# Patient Record
Sex: FEMALE | Race: WHITE | Employment: OTHER | URBAN - METROPOLITAN AREA
[De-identification: names, ages, dates, MRNs, and addresses within clinical notes are randomized per-mention and may not be internally consistent; named-entity substitution may affect disease eponyms.]

---

## 2018-10-08 ENCOUNTER — HOSPITAL ENCOUNTER (OUTPATIENT)
Dept: CT IMAGING | Age: 63
Discharge: HOME | End: 2018-10-08
Attending: INTERNAL MEDICINE
Payer: COMMERCIAL

## 2018-10-08 DIAGNOSIS — C34.11 CANCER OF BRONCHUS OF RIGHT UPPER LOBE (HCC): ICD-10-CM

## 2018-10-08 DIAGNOSIS — C34.11 MALIGNANT NEOPLASM OF UPPER LOBE, RIGHT BRONCHUS OR LUNG (HCC): ICD-10-CM

## 2018-10-08 LAB
GFR AFRICAN AMERICAN: >60 ML/MIN/1.73M2
GFR NON-AFRICAN AMERICAN: >60 ML/MIN/1.73M2
POC CREATININE: 0.9 MG/DL (ref 0.6–1.1)

## 2018-10-08 PROCEDURE — 6360000004 HC RX CONTRAST MEDICATION: Performed by: INTERNAL MEDICINE

## 2018-10-08 PROCEDURE — 71260 CT THORAX DX C+: CPT

## 2018-10-08 RX ADMIN — IOPAMIDOL 75 ML: 755 INJECTION, SOLUTION INTRAVENOUS at 09:54

## 2018-10-22 ENCOUNTER — HOSPITAL ENCOUNTER (OUTPATIENT)
Dept: PET IMAGING | Age: 63
Discharge: HOME | End: 2018-10-22
Payer: COMMERCIAL

## 2018-10-22 DIAGNOSIS — C34.11 CANCER OF BRONCHUS OF RIGHT UPPER LOBE (HCC): ICD-10-CM

## 2018-10-22 PROCEDURE — 78815 PET IMAGE W/CT SKULL-THIGH: CPT

## 2018-10-22 PROCEDURE — 3430000000 HC RX DIAGNOSTIC RADIOPHARMACEUTICAL: Performed by: INTERNAL MEDICINE

## 2018-10-22 PROCEDURE — A9552 F18 FDG: HCPCS | Performed by: INTERNAL MEDICINE

## 2018-10-22 RX ORDER — FLUDEOXYGLUCOSE F 18 200 MCI/ML
14.54 INJECTION, SOLUTION INTRAVENOUS
Status: COMPLETED | OUTPATIENT
Start: 2018-10-22 | End: 2018-10-22

## 2018-10-22 RX ADMIN — FLUDEOXYGLUCOSE F 18 14.54 MILLICURIE: 200 INJECTION, SOLUTION INTRAVENOUS at 08:31

## 2018-11-09 ENCOUNTER — HOSPITAL ENCOUNTER (OUTPATIENT)
Age: 63
Setting detail: SPECIMEN
Discharge: HOME | End: 2018-11-09
Payer: COMMERCIAL

## 2018-11-09 LAB
ALBUMIN SERPL-MCNC: 4.3 GM/DL (ref 3.4–5)
ALP BLD-CCNC: 138 IU/L (ref 40–129)
ALT SERPL-CCNC: 28 U/L (ref 10–40)
ANION GAP SERPL CALCULATED.3IONS-SCNC: 12 MMOL/L (ref 4–16)
AST SERPL-CCNC: 24 IU/L (ref 15–37)
BILIRUB SERPL-MCNC: 0.1 MG/DL (ref 0–1)
BUN BLDV-MCNC: 10 MG/DL (ref 6–23)
CALCIUM SERPL-MCNC: 9.2 MG/DL (ref 8.3–10.6)
CHLORIDE BLD-SCNC: 100 MMOL/L (ref 99–110)
CO2: 28 MMOL/L (ref 21–32)
CREAT SERPL-MCNC: 0.7 MG/DL (ref 0.6–1.1)
GFR AFRICAN AMERICAN: >60 ML/MIN/1.73M2
GFR NON-AFRICAN AMERICAN: >60 ML/MIN/1.73M2
GLUCOSE BLD-MCNC: 91 MG/DL (ref 70–99)
POTASSIUM SERPL-SCNC: 4.3 MMOL/L (ref 3.5–5.1)
SODIUM BLD-SCNC: 140 MMOL/L (ref 135–145)
TOTAL PROTEIN: 6.9 GM/DL (ref 6.4–8.2)

## 2018-11-09 PROCEDURE — 80053 COMPREHEN METABOLIC PANEL: CPT

## 2018-12-03 ENCOUNTER — HOSPITAL ENCOUNTER (OUTPATIENT)
Age: 63
Setting detail: SPECIMEN
Discharge: HOME | End: 2018-12-03
Payer: COMMERCIAL

## 2018-12-03 LAB — MAGNESIUM: 2.2 MG/DL (ref 1.8–2.4)

## 2018-12-03 PROCEDURE — 83735 ASSAY OF MAGNESIUM: CPT

## 2018-12-10 PROBLEM — C34.90 PRIMARY MALIGNANT NEOPLASM OF LUNG METASTATIC TO OTHER SITE (HCC): Status: ACTIVE | Noted: 2018-12-10

## 2018-12-26 ENCOUNTER — HOSPITAL ENCOUNTER (OUTPATIENT)
Age: 63
Setting detail: SPECIMEN
Discharge: HOME | End: 2018-12-26
Payer: COMMERCIAL

## 2018-12-26 LAB
ALBUMIN SERPL-MCNC: 3.9 GM/DL (ref 3.4–5)
ALP BLD-CCNC: 90 IU/L (ref 40–129)
ALT SERPL-CCNC: 11 U/L (ref 10–40)
ANION GAP SERPL CALCULATED.3IONS-SCNC: 13 MMOL/L (ref 4–16)
AST SERPL-CCNC: 16 IU/L (ref 15–37)
BILIRUB SERPL-MCNC: 0.1 MG/DL (ref 0–1)
BUN BLDV-MCNC: 8 MG/DL (ref 6–23)
CALCIUM SERPL-MCNC: 9 MG/DL (ref 8.3–10.6)
CHLORIDE BLD-SCNC: 102 MMOL/L (ref 99–110)
CO2: 25 MMOL/L (ref 21–32)
CREAT SERPL-MCNC: 0.6 MG/DL (ref 0.6–1.1)
GFR AFRICAN AMERICAN: >60 ML/MIN/1.73M2
GFR NON-AFRICAN AMERICAN: >60 ML/MIN/1.73M2
GLUCOSE BLD-MCNC: 96 MG/DL (ref 70–99)
MAGNESIUM: 1.8 MG/DL (ref 1.8–2.4)
POTASSIUM SERPL-SCNC: 4.2 MMOL/L (ref 3.5–5.1)
SODIUM BLD-SCNC: 140 MMOL/L (ref 135–145)
TOTAL PROTEIN: 6.4 GM/DL (ref 6.4–8.2)

## 2018-12-26 PROCEDURE — 83735 ASSAY OF MAGNESIUM: CPT

## 2018-12-26 PROCEDURE — 80053 COMPREHEN METABOLIC PANEL: CPT

## 2019-01-16 ENCOUNTER — HOSPITAL ENCOUNTER (OUTPATIENT)
Age: 64
Setting detail: SPECIMEN
Discharge: HOME | End: 2019-01-16
Payer: COMMERCIAL

## 2019-01-16 LAB — MAGNESIUM: 1.9 MG/DL (ref 1.8–2.4)

## 2019-01-16 PROCEDURE — 83735 ASSAY OF MAGNESIUM: CPT

## 2019-02-11 ENCOUNTER — HOSPITAL ENCOUNTER (OUTPATIENT)
Dept: CT IMAGING | Age: 64
Discharge: HOME | End: 2019-02-11
Payer: COMMERCIAL

## 2019-02-11 ENCOUNTER — HOSPITAL ENCOUNTER (OUTPATIENT)
Age: 64
Discharge: HOME | End: 2019-02-11
Payer: COMMERCIAL

## 2019-02-11 DIAGNOSIS — C34.11 CANCER OF BRONCHUS OF RIGHT UPPER LOBE (HCC): ICD-10-CM

## 2019-02-11 LAB
GFR AFRICAN AMERICAN: >60 ML/MIN/1.73M2
GFR NON-AFRICAN AMERICAN: >60 ML/MIN/1.73M2
POC CREATININE: 0.7 MG/DL (ref 0.6–1.1)

## 2019-02-11 PROCEDURE — 74177 CT ABD & PELVIS W/CONTRAST: CPT

## 2019-02-11 PROCEDURE — 6360000004 HC RX CONTRAST MEDICATION: Performed by: INTERNAL MEDICINE

## 2019-02-11 PROCEDURE — 70491 CT SOFT TISSUE NECK W/DYE: CPT

## 2019-02-11 PROCEDURE — 71260 CT THORAX DX C+: CPT

## 2019-02-11 RX ADMIN — IOPAMIDOL 75 ML: 755 INJECTION, SOLUTION INTRAVENOUS at 10:41

## 2019-02-11 RX ADMIN — IOHEXOL 50 ML: 240 INJECTION, SOLUTION INTRATHECAL; INTRAVASCULAR; INTRAVENOUS; ORAL at 10:41

## 2019-02-25 ENCOUNTER — HOSPITAL ENCOUNTER (OUTPATIENT)
Age: 64
Setting detail: OBSERVATION
Discharge: HOME | End: 2019-02-26
Attending: EMERGENCY MEDICINE | Admitting: INTERNAL MEDICINE
Payer: COMMERCIAL

## 2019-02-25 ENCOUNTER — APPOINTMENT (OUTPATIENT)
Dept: GENERAL RADIOLOGY | Age: 64
End: 2019-02-25
Payer: COMMERCIAL

## 2019-02-25 DIAGNOSIS — R07.9 CHEST PAIN, UNSPECIFIED TYPE: Primary | ICD-10-CM

## 2019-02-25 LAB
ALBUMIN SERPL-MCNC: 4.1 GM/DL (ref 3.4–5)
ALP BLD-CCNC: 89 IU/L (ref 40–129)
ALT SERPL-CCNC: 12 U/L (ref 10–40)
ANION GAP SERPL CALCULATED.3IONS-SCNC: 14 MMOL/L (ref 4–16)
ANISOCYTOSIS: ABNORMAL
AST SERPL-CCNC: 20 IU/L (ref 15–37)
BANDED NEUTROPHILS ABSOLUTE COUNT: 0.1 K/CU MM
BANDED NEUTROPHILS RELATIVE PERCENT: 4 % (ref 5–11)
BILIRUB SERPL-MCNC: 0.1 MG/DL (ref 0–1)
BUN BLDV-MCNC: 7 MG/DL (ref 6–23)
CALCIUM SERPL-MCNC: 9.1 MG/DL (ref 8.3–10.6)
CHLORIDE BLD-SCNC: 101 MMOL/L (ref 99–110)
CO2: 24 MMOL/L (ref 21–32)
CREAT SERPL-MCNC: 0.8 MG/DL (ref 0.6–1.1)
DIFFERENTIAL TYPE: ABNORMAL
EKG ATRIAL RATE: 93 BPM
EKG DIAGNOSIS: NORMAL
EKG P AXIS: 50 DEGREES
EKG P-R INTERVAL: 146 MS
EKG Q-T INTERVAL: 370 MS
EKG QRS DURATION: 82 MS
EKG QTC CALCULATION (BAZETT): 460 MS
EKG R AXIS: 12 DEGREES
EKG T AXIS: -19 DEGREES
EKG VENTRICULAR RATE: 93 BPM
EOSINOPHILS ABSOLUTE: 0.1 K/CU MM
EOSINOPHILS RELATIVE PERCENT: 2 % (ref 0–3)
GFR AFRICAN AMERICAN: >60 ML/MIN/1.73M2
GFR NON-AFRICAN AMERICAN: >60 ML/MIN/1.73M2
GLUCOSE BLD-MCNC: 118 MG/DL (ref 70–99)
HCT VFR BLD CALC: 28.7 % (ref 37–47)
HEMOGLOBIN: 9.4 GM/DL (ref 12.5–16)
LYMPHOCYTES ABSOLUTE: 1.4 K/CU MM
LYMPHOCYTES RELATIVE PERCENT: 54 % (ref 24–44)
MACROCYTES: ABNORMAL
MAGNESIUM: 2 MG/DL (ref 1.8–2.4)
MCH RBC QN AUTO: 32.9 PG (ref 27–31)
MCHC RBC AUTO-ENTMCNC: 32.8 % (ref 32–36)
MCV RBC AUTO: 100.3 FL (ref 78–100)
MONOCYTES ABSOLUTE: 0.3 K/CU MM
MONOCYTES RELATIVE PERCENT: 13 % (ref 0–4)
OVALOCYTES: ABNORMAL
PDW BLD-RTO: 17.9 % (ref 11.7–14.9)
PLATELET # BLD: 154 K/CU MM (ref 140–440)
PLT MORPHOLOGY: ABNORMAL
PMV BLD AUTO: 10.7 FL (ref 7.5–11.1)
POLYCHROMASIA: ABNORMAL
POTASSIUM SERPL-SCNC: 3.4 MMOL/L (ref 3.5–5.1)
RBC # BLD: 2.86 M/CU MM (ref 4.2–5.4)
SEGMENTED NEUTROPHILS ABSOLUTE COUNT: 0.7 K/CU MM
SEGMENTED NEUTROPHILS RELATIVE PERCENT: 27 % (ref 36–66)
SODIUM BLD-SCNC: 139 MMOL/L (ref 135–145)
TOTAL PROTEIN: 7 GM/DL (ref 6.4–8.2)
TROPONIN T: <0.01 NG/ML
TROPONIN T: <0.01 NG/ML
TSH HIGH SENSITIVITY: 13.19 UIU/ML (ref 0.27–4.2)
WBC # BLD: 2.6 K/CU MM (ref 4–10.5)

## 2019-02-25 PROCEDURE — 84484 ASSAY OF TROPONIN QUANT: CPT

## 2019-02-25 PROCEDURE — 2580000003 HC RX 258: Performed by: NURSE PRACTITIONER

## 2019-02-25 PROCEDURE — 6370000000 HC RX 637 (ALT 250 FOR IP): Performed by: NURSE PRACTITIONER

## 2019-02-25 PROCEDURE — 85007 BL SMEAR W/DIFF WBC COUNT: CPT

## 2019-02-25 PROCEDURE — 6370000000 HC RX 637 (ALT 250 FOR IP): Performed by: EMERGENCY MEDICINE

## 2019-02-25 PROCEDURE — G0378 HOSPITAL OBSERVATION PER HR: HCPCS

## 2019-02-25 PROCEDURE — 96372 THER/PROPH/DIAG INJ SC/IM: CPT

## 2019-02-25 PROCEDURE — 85027 COMPLETE CBC AUTOMATED: CPT

## 2019-02-25 PROCEDURE — 99285 EMERGENCY DEPT VISIT HI MDM: CPT

## 2019-02-25 PROCEDURE — 84443 ASSAY THYROID STIM HORMONE: CPT

## 2019-02-25 PROCEDURE — 71046 X-RAY EXAM CHEST 2 VIEWS: CPT

## 2019-02-25 PROCEDURE — 93010 ELECTROCARDIOGRAM REPORT: CPT | Performed by: INTERNAL MEDICINE

## 2019-02-25 PROCEDURE — 83735 ASSAY OF MAGNESIUM: CPT

## 2019-02-25 PROCEDURE — 93005 ELECTROCARDIOGRAM TRACING: CPT | Performed by: EMERGENCY MEDICINE

## 2019-02-25 PROCEDURE — 6360000002 HC RX W HCPCS: Performed by: NURSE PRACTITIONER

## 2019-02-25 PROCEDURE — 80053 COMPREHEN METABOLIC PANEL: CPT

## 2019-02-25 RX ORDER — NITROGLYCERIN 0.4 MG/1
0.4 TABLET SUBLINGUAL EVERY 5 MIN PRN
Status: COMPLETED | OUTPATIENT
Start: 2019-02-25 | End: 2019-02-25

## 2019-02-25 RX ORDER — ASPIRIN 325 MG
325 TABLET ORAL DAILY
Status: DISCONTINUED | OUTPATIENT
Start: 2019-02-25 | End: 2019-02-26 | Stop reason: HOSPADM

## 2019-02-25 RX ORDER — ATORVASTATIN CALCIUM 40 MG/1
40 TABLET, FILM COATED ORAL NIGHTLY
Status: DISCONTINUED | OUTPATIENT
Start: 2019-02-25 | End: 2019-02-26 | Stop reason: HOSPADM

## 2019-02-25 RX ORDER — SODIUM CHLORIDE 0.9 % (FLUSH) 0.9 %
10 SYRINGE (ML) INJECTION PRN
Status: DISCONTINUED | OUTPATIENT
Start: 2019-02-25 | End: 2019-02-26 | Stop reason: HOSPADM

## 2019-02-25 RX ORDER — ASPIRIN 81 MG/1
324 TABLET, CHEWABLE ORAL ONCE
Status: COMPLETED | OUTPATIENT
Start: 2019-02-25 | End: 2019-02-25

## 2019-02-25 RX ORDER — ACETAMINOPHEN 325 MG/1
650 TABLET ORAL EVERY 4 HOURS PRN
Status: DISCONTINUED | OUTPATIENT
Start: 2019-02-25 | End: 2019-02-26 | Stop reason: HOSPADM

## 2019-02-25 RX ORDER — ONDANSETRON 4 MG/1
8 TABLET, ORALLY DISINTEGRATING ORAL PRN
Status: DISCONTINUED | OUTPATIENT
Start: 2019-02-25 | End: 2019-02-26 | Stop reason: HOSPADM

## 2019-02-25 RX ORDER — ONDANSETRON 2 MG/ML
4 INJECTION INTRAMUSCULAR; INTRAVENOUS EVERY 6 HOURS PRN
Status: DISCONTINUED | OUTPATIENT
Start: 2019-02-25 | End: 2019-02-26 | Stop reason: HOSPADM

## 2019-02-25 RX ORDER — POTASSIUM CHLORIDE 20 MEQ/1
40 TABLET, EXTENDED RELEASE ORAL ONCE
Status: COMPLETED | OUTPATIENT
Start: 2019-02-25 | End: 2019-02-25

## 2019-02-25 RX ORDER — ASPIRIN 81 MG/1
81 TABLET, CHEWABLE ORAL DAILY
Status: CANCELLED | OUTPATIENT
Start: 2019-02-26

## 2019-02-25 RX ORDER — PROCHLORPERAZINE MALEATE 5 MG/1
10 TABLET ORAL PRN
Status: DISCONTINUED | OUTPATIENT
Start: 2019-02-25 | End: 2019-02-26 | Stop reason: HOSPADM

## 2019-02-25 RX ORDER — M-VIT,TX,IRON,MINS/CALC/FOLIC 27MG-0.4MG
1 TABLET ORAL DAILY
Status: DISCONTINUED | OUTPATIENT
Start: 2019-02-25 | End: 2019-02-26 | Stop reason: HOSPADM

## 2019-02-25 RX ORDER — SODIUM CHLORIDE 0.9 % (FLUSH) 0.9 %
10 SYRINGE (ML) INJECTION EVERY 12 HOURS SCHEDULED
Status: DISCONTINUED | OUTPATIENT
Start: 2019-02-25 | End: 2019-02-26 | Stop reason: HOSPADM

## 2019-02-25 RX ADMIN — NITROGLYCERIN 0.4 MG: 0.4 TABLET, ORALLY DISINTEGRATING SUBLINGUAL at 09:40

## 2019-02-25 RX ADMIN — ENOXAPARIN SODIUM 40 MG: 40 INJECTION SUBCUTANEOUS at 18:17

## 2019-02-25 RX ADMIN — MULTIPLE VITAMINS W/ MINERALS TAB 1 TABLET: TAB at 18:18

## 2019-02-25 RX ADMIN — ACETAMINOPHEN 650 MG: 325 TABLET ORAL at 18:50

## 2019-02-25 RX ADMIN — NITROGLYCERIN 0.4 MG: 0.4 TABLET, ORALLY DISINTEGRATING SUBLINGUAL at 12:45

## 2019-02-25 RX ADMIN — NITROGLYCERIN 0.4 MG: 0.4 TABLET, ORALLY DISINTEGRATING SUBLINGUAL at 18:02

## 2019-02-25 RX ADMIN — ATORVASTATIN CALCIUM 40 MG: 40 TABLET, FILM COATED ORAL at 22:44

## 2019-02-25 RX ADMIN — POTASSIUM CHLORIDE 40 MEQ: 1500 TABLET, EXTENDED RELEASE ORAL at 18:18

## 2019-02-25 RX ADMIN — ASPIRIN 325 MG ORAL TABLET 325 MG: 325 PILL ORAL at 18:18

## 2019-02-25 RX ADMIN — Medication 60 MG: at 22:44

## 2019-02-25 RX ADMIN — LEVOTHYROXINE SODIUM 175 MCG: 25 TABLET ORAL at 18:18

## 2019-02-25 RX ADMIN — SODIUM CHLORIDE, PRESERVATIVE FREE 10 ML: 5 INJECTION INTRAVENOUS at 19:57

## 2019-02-25 RX ADMIN — ASPIRIN 81 MG 324 MG: 81 TABLET ORAL at 09:40

## 2019-02-25 ASSESSMENT — PAIN DESCRIPTION - DESCRIPTORS: DESCRIPTORS: PRESSURE

## 2019-02-25 ASSESSMENT — PAIN DESCRIPTION - LOCATION: LOCATION: CHEST

## 2019-02-25 ASSESSMENT — PAIN DESCRIPTION - ORIENTATION: ORIENTATION: MID

## 2019-02-25 ASSESSMENT — PAIN DESCRIPTION - PAIN TYPE: TYPE: ACUTE PAIN

## 2019-02-25 ASSESSMENT — PAIN DESCRIPTION - FREQUENCY: FREQUENCY: CONTINUOUS

## 2019-02-25 ASSESSMENT — PAIN SCALES - GENERAL
PAINLEVEL_OUTOF10: 5
PAINLEVEL_OUTOF10: 3
PAINLEVEL_OUTOF10: 0

## 2019-02-26 ENCOUNTER — APPOINTMENT (OUTPATIENT)
Dept: NUCLEAR MEDICINE | Age: 64
End: 2019-02-26
Payer: COMMERCIAL

## 2019-02-26 VITALS
TEMPERATURE: 98.8 F | DIASTOLIC BLOOD PRESSURE: 73 MMHG | RESPIRATION RATE: 19 BRPM | BODY MASS INDEX: 29.43 KG/M2 | WEIGHT: 172.4 LBS | OXYGEN SATURATION: 97 % | HEART RATE: 96 BPM | SYSTOLIC BLOOD PRESSURE: 142 MMHG | HEIGHT: 64 IN

## 2019-02-26 LAB
ANION GAP SERPL CALCULATED.3IONS-SCNC: 13 MMOL/L (ref 4–16)
BUN BLDV-MCNC: 6 MG/DL (ref 6–23)
CALCIUM SERPL-MCNC: 8.2 MG/DL (ref 8.3–10.6)
CHLORIDE BLD-SCNC: 104 MMOL/L (ref 99–110)
CHOLESTEROL: 256 MG/DL
CO2: 23 MMOL/L (ref 21–32)
CREAT SERPL-MCNC: 0.7 MG/DL (ref 0.6–1.1)
EKG ATRIAL RATE: 85 BPM
EKG DIAGNOSIS: NORMAL
EKG P AXIS: 49 DEGREES
EKG P-R INTERVAL: 160 MS
EKG Q-T INTERVAL: 402 MS
EKG QRS DURATION: 76 MS
EKG QTC CALCULATION (BAZETT): 478 MS
EKG R AXIS: 32 DEGREES
EKG T AXIS: 27 DEGREES
EKG VENTRICULAR RATE: 85 BPM
FOLATE: >20 NG/ML (ref 3.1–17.5)
GFR AFRICAN AMERICAN: >60 ML/MIN/1.73M2
GFR NON-AFRICAN AMERICAN: >60 ML/MIN/1.73M2
GLUCOSE BLD-MCNC: 106 MG/DL (ref 70–99)
HCT VFR BLD CALC: 27.7 % (ref 37–47)
HDLC SERPL-MCNC: 70 MG/DL
HEMOGLOBIN: 8.8 GM/DL (ref 12.5–16)
LDL CHOLESTEROL DIRECT: 187 MG/DL
LV EF: 58 %
LV EF: 70 %
LVEF MODALITY: NORMAL
LVEF MODALITY: NORMAL
MCH RBC QN AUTO: 32.5 PG (ref 27–31)
MCHC RBC AUTO-ENTMCNC: 31.8 % (ref 32–36)
MCV RBC AUTO: 102.2 FL (ref 78–100)
PDW BLD-RTO: 18.4 % (ref 11.7–14.9)
PLATELET # BLD: 154 K/CU MM (ref 140–440)
PMV BLD AUTO: 11 FL (ref 7.5–11.1)
POTASSIUM SERPL-SCNC: 3.8 MMOL/L (ref 3.5–5.1)
RBC # BLD: 2.71 M/CU MM (ref 4.2–5.4)
SODIUM BLD-SCNC: 140 MMOL/L (ref 135–145)
TRIGL SERPL-MCNC: 104 MG/DL
VITAMIN B-12: 541.8 PG/ML (ref 211–911)
WBC # BLD: 2 K/CU MM (ref 4–10.5)

## 2019-02-26 PROCEDURE — A9500 TC99M SESTAMIBI: HCPCS | Performed by: INTERNAL MEDICINE

## 2019-02-26 PROCEDURE — 83721 ASSAY OF BLOOD LIPOPROTEIN: CPT

## 2019-02-26 PROCEDURE — 80048 BASIC METABOLIC PNL TOTAL CA: CPT

## 2019-02-26 PROCEDURE — 78452 HT MUSCLE IMAGE SPECT MULT: CPT

## 2019-02-26 PROCEDURE — 93017 CV STRESS TEST TRACING ONLY: CPT

## 2019-02-26 PROCEDURE — 6360000002 HC RX W HCPCS: Performed by: INTERNAL MEDICINE

## 2019-02-26 PROCEDURE — 82607 VITAMIN B-12: CPT

## 2019-02-26 PROCEDURE — 93306 TTE W/DOPPLER COMPLETE: CPT

## 2019-02-26 PROCEDURE — 99244 OFF/OP CNSLTJ NEW/EST MOD 40: CPT | Performed by: INTERNAL MEDICINE

## 2019-02-26 PROCEDURE — 3430000000 HC RX DIAGNOSTIC RADIOPHARMACEUTICAL: Performed by: INTERNAL MEDICINE

## 2019-02-26 PROCEDURE — 2580000003 HC RX 258: Performed by: NURSE PRACTITIONER

## 2019-02-26 PROCEDURE — 85027 COMPLETE CBC AUTOMATED: CPT

## 2019-02-26 PROCEDURE — G0378 HOSPITAL OBSERVATION PER HR: HCPCS

## 2019-02-26 PROCEDURE — 94761 N-INVAS EAR/PLS OXIMETRY MLT: CPT

## 2019-02-26 PROCEDURE — 80061 LIPID PANEL: CPT

## 2019-02-26 PROCEDURE — 82746 ASSAY OF FOLIC ACID SERUM: CPT

## 2019-02-26 RX ORDER — ATORVASTATIN CALCIUM 10 MG/1
10 TABLET, FILM COATED ORAL NIGHTLY
Qty: 30 TABLET | Refills: 0 | Status: ON HOLD | OUTPATIENT
Start: 2019-02-26 | End: 2019-03-14 | Stop reason: HOSPADM

## 2019-02-26 RX ADMIN — REGADENOSON 0.4 MG: 0.08 INJECTION, SOLUTION INTRAVENOUS at 12:23

## 2019-02-26 RX ADMIN — SODIUM CHLORIDE, PRESERVATIVE FREE 10 ML: 5 INJECTION INTRAVENOUS at 18:07

## 2019-02-26 RX ADMIN — Medication 30 MILLICURIE: at 12:20

## 2019-02-26 RX ADMIN — Medication 10 MILLICURIE: at 10:35

## 2019-02-26 ASSESSMENT — PAIN DESCRIPTION - FREQUENCY: FREQUENCY: CONTINUOUS

## 2019-02-26 ASSESSMENT — PAIN DESCRIPTION - PAIN TYPE: TYPE: ACUTE PAIN

## 2019-02-26 ASSESSMENT — PAIN SCALES - GENERAL: PAINLEVEL_OUTOF10: 4

## 2019-02-26 ASSESSMENT — PAIN DESCRIPTION - DESCRIPTORS: DESCRIPTORS: PRESSURE

## 2019-02-26 ASSESSMENT — PAIN DESCRIPTION - ORIENTATION: ORIENTATION: MID

## 2019-02-26 ASSESSMENT — PAIN DESCRIPTION - LOCATION: LOCATION: CHEST

## 2019-03-08 ENCOUNTER — HOSPITAL ENCOUNTER (OUTPATIENT)
Age: 64
Setting detail: SPECIMEN
Discharge: HOME | DRG: 193 | End: 2019-03-08
Payer: COMMERCIAL

## 2019-03-08 LAB
ALBUMIN SERPL-MCNC: 4.1 GM/DL (ref 3.4–5)
ALP BLD-CCNC: 85 IU/L (ref 40–128)
ALT SERPL-CCNC: 12 U/L (ref 10–40)
ANION GAP SERPL CALCULATED.3IONS-SCNC: 13 MMOL/L (ref 4–16)
AST SERPL-CCNC: 18 IU/L (ref 15–37)
BILIRUB SERPL-MCNC: 0.2 MG/DL (ref 0–1)
BUN BLDV-MCNC: 8 MG/DL (ref 6–23)
CALCIUM SERPL-MCNC: 8.8 MG/DL (ref 8.3–10.6)
CHLORIDE BLD-SCNC: 95 MMOL/L (ref 99–110)
CO2: 27 MMOL/L (ref 21–32)
CREAT SERPL-MCNC: 0.6 MG/DL (ref 0.6–1.1)
GFR AFRICAN AMERICAN: >60 ML/MIN/1.73M2
GFR NON-AFRICAN AMERICAN: >60 ML/MIN/1.73M2
GLUCOSE BLD-MCNC: 117 MG/DL (ref 70–99)
POTASSIUM SERPL-SCNC: 3.6 MMOL/L (ref 3.5–5.1)
SODIUM BLD-SCNC: 135 MMOL/L (ref 135–145)
TOTAL PROTEIN: 6.5 GM/DL (ref 6.4–8.2)

## 2019-03-08 PROCEDURE — 80053 COMPREHEN METABOLIC PANEL: CPT

## 2019-03-10 ENCOUNTER — APPOINTMENT (OUTPATIENT)
Dept: GENERAL RADIOLOGY | Age: 64
DRG: 193 | End: 2019-03-10
Payer: COMMERCIAL

## 2019-03-10 ENCOUNTER — HOSPITAL ENCOUNTER (INPATIENT)
Age: 64
LOS: 4 days | Discharge: HOME | DRG: 193 | End: 2019-03-14
Attending: EMERGENCY MEDICINE | Admitting: INTERNAL MEDICINE
Payer: COMMERCIAL

## 2019-03-10 DIAGNOSIS — D61.818 PANCYTOPENIA (HCC): ICD-10-CM

## 2019-03-10 DIAGNOSIS — E87.6 HYPOKALEMIA: ICD-10-CM

## 2019-03-10 DIAGNOSIS — E87.1 HYPONATREMIA: ICD-10-CM

## 2019-03-10 DIAGNOSIS — J10.1 INFLUENZA A: Primary | ICD-10-CM

## 2019-03-10 LAB
ALBUMIN SERPL-MCNC: 3.7 GM/DL (ref 3.4–5)
ALP BLD-CCNC: 73 IU/L (ref 40–129)
ALT SERPL-CCNC: 20 U/L (ref 10–40)
ANION GAP SERPL CALCULATED.3IONS-SCNC: 10 MMOL/L (ref 4–16)
AST SERPL-CCNC: 26 IU/L (ref 15–37)
BILIRUB SERPL-MCNC: 0.1 MG/DL (ref 0–1)
BUN BLDV-MCNC: 7 MG/DL (ref 6–23)
CALCIUM SERPL-MCNC: 7.8 MG/DL (ref 8.3–10.6)
CHLORIDE BLD-SCNC: 89 MMOL/L (ref 99–110)
CO2: 25 MMOL/L (ref 21–32)
CREAT SERPL-MCNC: 0.5 MG/DL (ref 0.6–1.1)
DIFFERENTIAL TYPE: ABNORMAL
EOSINOPHILS ABSOLUTE: 0 K/CU MM
EOSINOPHILS RELATIVE PERCENT: 1 % (ref 0–3)
GFR AFRICAN AMERICAN: >60 ML/MIN/1.73M2
GFR NON-AFRICAN AMERICAN: >60 ML/MIN/1.73M2
GLUCOSE BLD-MCNC: 109 MG/DL (ref 70–99)
HCT VFR BLD CALC: 21.7 % (ref 37–47)
HEMOGLOBIN: 7.2 GM/DL (ref 12.5–16)
LACTATE: 1.3 MMOL/L (ref 0.4–2)
LYMPHOCYTES ABSOLUTE: 0.3 K/CU MM
LYMPHOCYTES RELATIVE PERCENT: 37 % (ref 24–44)
MCH RBC QN AUTO: 33 PG (ref 27–31)
MCHC RBC AUTO-ENTMCNC: 33.2 % (ref 32–36)
MCV RBC AUTO: 99.5 FL (ref 78–100)
MICROCYTES: ABNORMAL
MONOCYTES ABSOLUTE: 0.1 K/CU MM
MONOCYTES RELATIVE PERCENT: 12 % (ref 0–4)
PDW BLD-RTO: 14.7 % (ref 11.7–14.9)
PLATELET # BLD: ABNORMAL K/CU MM (ref 140–440)
PMV BLD AUTO: 11.8 FL (ref 7.5–11.1)
POLYCHROMASIA: ABNORMAL
POTASSIUM SERPL-SCNC: ABNORMAL MMOL/L (ref 3.5–5.1)
PRO-BNP: 132.3 PG/ML
RAPID INFLUENZA  B AGN: NEGATIVE
RAPID INFLUENZA A AGN: POSITIVE
RBC # BLD: 2.18 M/CU MM (ref 4.2–5.4)
SEGMENTED NEUTROPHILS ABSOLUTE COUNT: 0.5 K/CU MM
SEGMENTED NEUTROPHILS RELATIVE PERCENT: 50 % (ref 36–66)
SODIUM BLD-SCNC: 124 MMOL/L (ref 135–145)
TOTAL PROTEIN: 6.3 GM/DL (ref 6.4–8.2)
TROPONIN T: <0.01 NG/ML
WBC # BLD: 0.9 K/CU MM (ref 4–10.5)

## 2019-03-10 PROCEDURE — 94761 N-INVAS EAR/PLS OXIMETRY MLT: CPT

## 2019-03-10 PROCEDURE — 2580000003 HC RX 258: Performed by: PHYSICIAN ASSISTANT

## 2019-03-10 PROCEDURE — 84484 ASSAY OF TROPONIN QUANT: CPT

## 2019-03-10 PROCEDURE — 96365 THER/PROPH/DIAG IV INF INIT: CPT

## 2019-03-10 PROCEDURE — 6360000002 HC RX W HCPCS: Performed by: PHYSICIAN ASSISTANT

## 2019-03-10 PROCEDURE — 83880 ASSAY OF NATRIURETIC PEPTIDE: CPT

## 2019-03-10 PROCEDURE — 85007 BL SMEAR W/DIFF WBC COUNT: CPT

## 2019-03-10 PROCEDURE — 83605 ASSAY OF LACTIC ACID: CPT

## 2019-03-10 PROCEDURE — 87804 INFLUENZA ASSAY W/OPTIC: CPT

## 2019-03-10 PROCEDURE — 6370000000 HC RX 637 (ALT 250 FOR IP): Performed by: PHYSICIAN ASSISTANT

## 2019-03-10 PROCEDURE — 87040 BLOOD CULTURE FOR BACTERIA: CPT

## 2019-03-10 PROCEDURE — 1200000000 HC SEMI PRIVATE

## 2019-03-10 PROCEDURE — 93005 ELECTROCARDIOGRAM TRACING: CPT | Performed by: EMERGENCY MEDICINE

## 2019-03-10 PROCEDURE — 94640 AIRWAY INHALATION TREATMENT: CPT

## 2019-03-10 PROCEDURE — 80053 COMPREHEN METABOLIC PANEL: CPT

## 2019-03-10 PROCEDURE — 99284 EMERGENCY DEPT VISIT MOD MDM: CPT

## 2019-03-10 PROCEDURE — 71046 X-RAY EXAM CHEST 2 VIEWS: CPT

## 2019-03-10 PROCEDURE — 85027 COMPLETE CBC AUTOMATED: CPT

## 2019-03-10 RX ORDER — POTASSIUM CHLORIDE 7.45 MG/ML
10 INJECTION INTRAVENOUS
Status: COMPLETED | OUTPATIENT
Start: 2019-03-10 | End: 2019-03-11

## 2019-03-10 RX ORDER — POTASSIUM CHLORIDE 750 MG/1
40 TABLET, FILM COATED, EXTENDED RELEASE ORAL ONCE
Status: COMPLETED | OUTPATIENT
Start: 2019-03-10 | End: 2019-03-10

## 2019-03-10 RX ORDER — CODEINE PHOSPHATE AND GUAIFENESIN 10; 100 MG/5ML; MG/5ML
5 SOLUTION ORAL ONCE
Status: DISCONTINUED | OUTPATIENT
Start: 2019-03-10 | End: 2019-03-10

## 2019-03-10 RX ORDER — SODIUM CHLORIDE 9 MG/ML
INJECTION, SOLUTION INTRAVENOUS CONTINUOUS
Status: DISCONTINUED | OUTPATIENT
Start: 2019-03-10 | End: 2019-03-11

## 2019-03-10 RX ORDER — IPRATROPIUM BROMIDE AND ALBUTEROL SULFATE 2.5; .5 MG/3ML; MG/3ML
3 SOLUTION RESPIRATORY (INHALATION) ONCE
Status: COMPLETED | OUTPATIENT
Start: 2019-03-10 | End: 2019-03-10

## 2019-03-10 RX ORDER — OSELTAMIVIR PHOSPHATE 75 MG/1
75 CAPSULE ORAL ONCE
Status: COMPLETED | OUTPATIENT
Start: 2019-03-10 | End: 2019-03-10

## 2019-03-10 RX ORDER — 0.9 % SODIUM CHLORIDE 0.9 %
1000 INTRAVENOUS SOLUTION INTRAVENOUS ONCE
Status: COMPLETED | OUTPATIENT
Start: 2019-03-10 | End: 2019-03-11

## 2019-03-10 RX ADMIN — OSELTAMIVIR PHOSPHATE 75 MG: 75 CAPSULE ORAL at 22:25

## 2019-03-10 RX ADMIN — IPRATROPIUM BROMIDE AND ALBUTEROL SULFATE 3 AMPULE: .5; 3 SOLUTION RESPIRATORY (INHALATION) at 21:37

## 2019-03-10 RX ADMIN — POTASSIUM CHLORIDE 40 MEQ: 750 TABLET, FILM COATED, EXTENDED RELEASE ORAL at 22:37

## 2019-03-10 RX ADMIN — POTASSIUM CHLORIDE 10 MEQ: 7.46 INJECTION, SOLUTION INTRAVENOUS at 23:24

## 2019-03-10 RX ADMIN — SODIUM CHLORIDE 1000 ML: 9 INJECTION, SOLUTION INTRAVENOUS at 22:37

## 2019-03-10 RX ADMIN — SODIUM CHLORIDE: 9 INJECTION, SOLUTION INTRAVENOUS at 23:24

## 2019-03-10 ASSESSMENT — PAIN DESCRIPTION - PAIN TYPE: TYPE: ACUTE PAIN

## 2019-03-10 ASSESSMENT — PAIN SCALES - GENERAL: PAINLEVEL_OUTOF10: 5

## 2019-03-10 ASSESSMENT — PAIN DESCRIPTION - LOCATION: LOCATION: CHEST

## 2019-03-11 LAB
ANION GAP SERPL CALCULATED.3IONS-SCNC: 8 MMOL/L (ref 4–16)
ANISOCYTOSIS: ABNORMAL
BANDED NEUTROPHILS ABSOLUTE COUNT: 0.08 K/CU MM
BANDED NEUTROPHILS RELATIVE PERCENT: 11 % (ref 5–11)
BUN BLDV-MCNC: 5 MG/DL (ref 6–23)
CALCIUM SERPL-MCNC: 7.6 MG/DL (ref 8.3–10.6)
CHLORIDE BLD-SCNC: 100 MMOL/L (ref 99–110)
CO2: 25 MMOL/L (ref 21–32)
CREAT SERPL-MCNC: 0.5 MG/DL (ref 0.6–1.1)
DIFFERENTIAL TYPE: ABNORMAL
EOSINOPHILS ABSOLUTE: 0 K/CU MM
EOSINOPHILS RELATIVE PERCENT: 2 % (ref 0–3)
GFR AFRICAN AMERICAN: >60 ML/MIN/1.73M2
GFR NON-AFRICAN AMERICAN: >60 ML/MIN/1.73M2
GLUCOSE BLD-MCNC: 107 MG/DL (ref 70–99)
HCT VFR BLD CALC: 21.8 % (ref 37–47)
HEMOGLOBIN: 7 GM/DL (ref 12.5–16)
LYMPHOCYTES ABSOLUTE: 0.3 K/CU MM
LYMPHOCYTES RELATIVE PERCENT: 42 % (ref 24–44)
MACROCYTES: ABNORMAL
MAGNESIUM: 1.6 MG/DL (ref 1.8–2.4)
MCH RBC QN AUTO: 32.6 PG (ref 27–31)
MCHC RBC AUTO-ENTMCNC: 32.1 % (ref 32–36)
MCV RBC AUTO: 101.4 FL (ref 78–100)
MONOCYTES ABSOLUTE: 0.1 K/CU MM
MONOCYTES RELATIVE PERCENT: 17 % (ref 0–4)
PDW BLD-RTO: 14.8 % (ref 11.7–14.9)
PHOSPHORUS: 2.5 MG/DL (ref 2.5–4.9)
PLATELET # BLD: 37 K/CU MM (ref 140–440)
PMV BLD AUTO: 10.4 FL (ref 7.5–11.1)
POTASSIUM SERPL-SCNC: 3.9 MMOL/L (ref 3.5–5.1)
RBC # BLD: 2.15 M/CU MM (ref 4.2–5.4)
RBC # BLD: ABNORMAL 10*6/UL
SEGMENTED NEUTROPHILS ABSOLUTE COUNT: 0.2 K/CU MM
SEGMENTED NEUTROPHILS RELATIVE PERCENT: 28 % (ref 36–66)
SODIUM BLD-SCNC: 133 MMOL/L (ref 135–145)
WBC # BLD: ABNORMAL 10*3/UL
WBC # BLD: ABNORMAL K/CU MM (ref 4–10.5)

## 2019-03-11 PROCEDURE — 83735 ASSAY OF MAGNESIUM: CPT

## 2019-03-11 PROCEDURE — 6370000000 HC RX 637 (ALT 250 FOR IP): Performed by: INTERNAL MEDICINE

## 2019-03-11 PROCEDURE — 2580000003 HC RX 258: Performed by: INTERNAL MEDICINE

## 2019-03-11 PROCEDURE — 6360000002 HC RX W HCPCS: Performed by: PHYSICIAN ASSISTANT

## 2019-03-11 PROCEDURE — 6360000002 HC RX W HCPCS: Performed by: HOSPITALIST

## 2019-03-11 PROCEDURE — 6360000002 HC RX W HCPCS: Performed by: INTERNAL MEDICINE

## 2019-03-11 PROCEDURE — 1200000000 HC SEMI PRIVATE

## 2019-03-11 PROCEDURE — 2580000003 HC RX 258: Performed by: HOSPITALIST

## 2019-03-11 PROCEDURE — 84100 ASSAY OF PHOSPHORUS: CPT

## 2019-03-11 PROCEDURE — 2700000000 HC OXYGEN THERAPY PER DAY

## 2019-03-11 PROCEDURE — 94761 N-INVAS EAR/PLS OXIMETRY MLT: CPT

## 2019-03-11 PROCEDURE — 94640 AIRWAY INHALATION TREATMENT: CPT

## 2019-03-11 PROCEDURE — 85027 COMPLETE CBC AUTOMATED: CPT

## 2019-03-11 PROCEDURE — 85007 BL SMEAR W/DIFF WBC COUNT: CPT

## 2019-03-11 PROCEDURE — 80048 BASIC METABOLIC PNL TOTAL CA: CPT

## 2019-03-11 PROCEDURE — 94668 MNPJ CHEST WALL SBSQ: CPT

## 2019-03-11 PROCEDURE — 94667 MNPJ CHEST WALL 1ST: CPT

## 2019-03-11 RX ORDER — GUAIFENESIN/DEXTROMETHORPHAN 100-10MG/5
5 SYRUP ORAL EVERY 4 HOURS PRN
Status: DISCONTINUED | OUTPATIENT
Start: 2019-03-11 | End: 2019-03-14 | Stop reason: HOSPADM

## 2019-03-11 RX ORDER — SODIUM CHLORIDE 0.9 % (FLUSH) 0.9 %
10 SYRINGE (ML) INJECTION PRN
Status: DISCONTINUED | OUTPATIENT
Start: 2019-03-11 | End: 2019-03-14 | Stop reason: HOSPADM

## 2019-03-11 RX ORDER — GUAIFENESIN 600 MG/1
600 TABLET, EXTENDED RELEASE ORAL 2 TIMES DAILY
Status: DISCONTINUED | OUTPATIENT
Start: 2019-03-11 | End: 2019-03-14 | Stop reason: HOSPADM

## 2019-03-11 RX ORDER — IPRATROPIUM BROMIDE AND ALBUTEROL SULFATE 2.5; .5 MG/3ML; MG/3ML
1 SOLUTION RESPIRATORY (INHALATION)
Status: DISCONTINUED | OUTPATIENT
Start: 2019-03-11 | End: 2019-03-14 | Stop reason: HOSPADM

## 2019-03-11 RX ORDER — ASPIRIN 325 MG
325 TABLET ORAL DAILY
Status: DISCONTINUED | OUTPATIENT
Start: 2019-03-11 | End: 2019-03-14 | Stop reason: HOSPADM

## 2019-03-11 RX ORDER — POTASSIUM CHLORIDE AND SODIUM CHLORIDE 900; 300 MG/100ML; MG/100ML
INJECTION, SOLUTION INTRAVENOUS CONTINUOUS
Status: DISCONTINUED | OUTPATIENT
Start: 2019-03-11 | End: 2019-03-12

## 2019-03-11 RX ORDER — OSELTAMIVIR PHOSPHATE 75 MG/1
75 CAPSULE ORAL 2 TIMES DAILY
Status: DISCONTINUED | OUTPATIENT
Start: 2019-03-11 | End: 2019-03-14 | Stop reason: HOSPADM

## 2019-03-11 RX ORDER — ONDANSETRON 4 MG/1
8 TABLET, ORALLY DISINTEGRATING ORAL DAILY PRN
Status: DISCONTINUED | OUTPATIENT
Start: 2019-03-11 | End: 2019-03-14 | Stop reason: HOSPADM

## 2019-03-11 RX ORDER — POTASSIUM CHLORIDE 20 MEQ/1
40 TABLET, EXTENDED RELEASE ORAL PRN
Status: DISCONTINUED | OUTPATIENT
Start: 2019-03-11 | End: 2019-03-14 | Stop reason: HOSPADM

## 2019-03-11 RX ORDER — POTASSIUM CHLORIDE 7.45 MG/ML
10 INJECTION INTRAVENOUS PRN
Status: DISCONTINUED | OUTPATIENT
Start: 2019-03-11 | End: 2019-03-14 | Stop reason: HOSPADM

## 2019-03-11 RX ORDER — M-VIT,TX,IRON,MINS/CALC/FOLIC 27MG-0.4MG
1 TABLET ORAL DAILY
Status: DISCONTINUED | OUTPATIENT
Start: 2019-03-11 | End: 2019-03-14 | Stop reason: HOSPADM

## 2019-03-11 RX ORDER — ONDANSETRON 2 MG/ML
4 INJECTION INTRAMUSCULAR; INTRAVENOUS EVERY 6 HOURS PRN
Status: DISCONTINUED | OUTPATIENT
Start: 2019-03-11 | End: 2019-03-14 | Stop reason: HOSPADM

## 2019-03-11 RX ORDER — LACTOBACILLUS RHAMNOSUS GG 10B CELL
1 CAPSULE ORAL
Status: DISCONTINUED | OUTPATIENT
Start: 2019-03-11 | End: 2019-03-14 | Stop reason: HOSPADM

## 2019-03-11 RX ORDER — SODIUM CHLORIDE 9 MG/ML
INJECTION, SOLUTION INTRAVENOUS CONTINUOUS
Status: DISCONTINUED | OUTPATIENT
Start: 2019-03-11 | End: 2019-03-11

## 2019-03-11 RX ORDER — PROCHLORPERAZINE MALEATE 5 MG/1
10 TABLET ORAL EVERY 6 HOURS PRN
Status: DISCONTINUED | OUTPATIENT
Start: 2019-03-11 | End: 2019-03-14 | Stop reason: HOSPADM

## 2019-03-11 RX ORDER — BENZONATATE 100 MG/1
100 CAPSULE ORAL 3 TIMES DAILY PRN
Status: DISCONTINUED | OUTPATIENT
Start: 2019-03-11 | End: 2019-03-14 | Stop reason: HOSPADM

## 2019-03-11 RX ORDER — SODIUM CHLORIDE 0.9 % (FLUSH) 0.9 %
10 SYRINGE (ML) INJECTION EVERY 12 HOURS SCHEDULED
Status: DISCONTINUED | OUTPATIENT
Start: 2019-03-11 | End: 2019-03-14 | Stop reason: HOSPADM

## 2019-03-11 RX ORDER — LANOLIN ALCOHOL/MO/W.PET/CERES
500 CREAM (GRAM) TOPICAL NIGHTLY
Status: DISCONTINUED | OUTPATIENT
Start: 2019-03-11 | End: 2019-03-14 | Stop reason: HOSPADM

## 2019-03-11 RX ORDER — VITAMIN B COMPLEX
1 CAPSULE ORAL NIGHTLY
Status: DISCONTINUED | OUTPATIENT
Start: 2019-03-11 | End: 2019-03-14 | Stop reason: HOSPADM

## 2019-03-11 RX ORDER — POTASSIUM CHLORIDE 1.5 G/1.77G
40 POWDER, FOR SOLUTION ORAL PRN
Status: DISCONTINUED | OUTPATIENT
Start: 2019-03-11 | End: 2019-03-14 | Stop reason: HOSPADM

## 2019-03-11 RX ADMIN — ENOXAPARIN SODIUM 40 MG: 40 INJECTION SUBCUTANEOUS at 08:34

## 2019-03-11 RX ADMIN — GUAIFENESIN 600 MG: 600 TABLET, EXTENDED RELEASE ORAL at 08:29

## 2019-03-11 RX ADMIN — TBO-FILGRASTIM 300 MCG: 300 INJECTION, SOLUTION SUBCUTANEOUS at 15:09

## 2019-03-11 RX ADMIN — GUAIFENESIN AND DEXTROMETHORPHAN 5 ML: 100; 10 SYRUP ORAL at 06:08

## 2019-03-11 RX ADMIN — ONDANSETRON 4 MG: 2 INJECTION INTRAMUSCULAR; INTRAVENOUS at 02:32

## 2019-03-11 RX ADMIN — Medication 500 MG: at 22:21

## 2019-03-11 RX ADMIN — IPRATROPIUM BROMIDE AND ALBUTEROL SULFATE 1 AMPULE: .5; 3 SOLUTION RESPIRATORY (INHALATION) at 11:23

## 2019-03-11 RX ADMIN — SODIUM CHLORIDE, PRESERVATIVE FREE 10 ML: 5 INJECTION INTRAVENOUS at 21:57

## 2019-03-11 RX ADMIN — IPRATROPIUM BROMIDE AND ALBUTEROL SULFATE 1 AMPULE: .5; 3 SOLUTION RESPIRATORY (INHALATION) at 07:57

## 2019-03-11 RX ADMIN — OSELTAMIVIR PHOSPHATE 75 MG: 75 CAPSULE ORAL at 08:30

## 2019-03-11 RX ADMIN — IPRATROPIUM BROMIDE AND ALBUTEROL SULFATE 1 AMPULE: .5; 3 SOLUTION RESPIRATORY (INHALATION) at 19:32

## 2019-03-11 RX ADMIN — ASPIRIN 325 MG ORAL TABLET 325 MG: 325 PILL ORAL at 08:29

## 2019-03-11 RX ADMIN — MULTIPLE VITAMINS W/ MINERALS TAB 1 TABLET: TAB at 11:46

## 2019-03-11 RX ADMIN — POTASSIUM CHLORIDE 10 MEQ: 7.46 INJECTION, SOLUTION INTRAVENOUS at 00:21

## 2019-03-11 RX ADMIN — LEVOTHYROXINE SODIUM 175 MCG: 100 TABLET ORAL at 08:34

## 2019-03-11 RX ADMIN — CEFEPIME 2 G: 2 INJECTION, POWDER, FOR SOLUTION INTRAVENOUS at 15:56

## 2019-03-11 RX ADMIN — Medication 2000 UNITS: at 08:29

## 2019-03-11 RX ADMIN — OSELTAMIVIR PHOSPHATE 75 MG: 75 CAPSULE ORAL at 21:56

## 2019-03-11 RX ADMIN — POTASSIUM CHLORIDE AND SODIUM CHLORIDE: 900; 300 INJECTION, SOLUTION INTRAVENOUS at 15:10

## 2019-03-11 RX ADMIN — Medication 1 CAPSULE: at 08:29

## 2019-03-11 RX ADMIN — VANCOMYCIN HYDROCHLORIDE 1500 MG: 5 INJECTION, POWDER, LYOPHILIZED, FOR SOLUTION INTRAVENOUS at 13:22

## 2019-03-11 RX ADMIN — IPRATROPIUM BROMIDE AND ALBUTEROL SULFATE 1 AMPULE: .5; 3 SOLUTION RESPIRATORY (INHALATION) at 15:30

## 2019-03-11 RX ADMIN — CEFEPIME 2 G: 2 INJECTION, POWDER, FOR SOLUTION INTRAVENOUS at 02:31

## 2019-03-11 RX ADMIN — SODIUM CHLORIDE, PRESERVATIVE FREE 10 ML: 5 INJECTION INTRAVENOUS at 08:29

## 2019-03-11 RX ADMIN — SODIUM CHLORIDE: 9 INJECTION, SOLUTION INTRAVENOUS at 02:31

## 2019-03-11 RX ADMIN — GUAIFENESIN AND DEXTROMETHORPHAN 5 ML: 100; 10 SYRUP ORAL at 15:10

## 2019-03-11 ASSESSMENT — PAIN SCALES - GENERAL
PAINLEVEL_OUTOF10: 0

## 2019-03-11 NOTE — ED NOTES
Awaiting Potassium from pharmacy. Patient denies needs at this time.      Maday Carey RN  03/10/19 2023

## 2019-03-11 NOTE — PROGRESS NOTES
PHARMACY [facility] MONITORING SERVICE    Vishal Lincoln is a 59 y.o. female started on vancomycin for possible pneumonia. Pharmacy consulted by Dr. Randy Padilla for monitoring and adjustment. Indication for treatment: Pneumonia  Goal trough: 15-20 mcg/mL  Other antimicrobials: Cefepime    Ht Readings from Last 1 Encounters:   03/11/19 5' 3\" (1.6 m)     Wt Readings from Last 3 Encounters:   03/11/19 172 lb 6.4 oz (78.2 kg)   02/26/19 172 lb 6.4 oz (78.2 kg)   12/10/18 170 lb (77.1 kg)        Pertinent Laboratory Values:   Temp Readings from Last 3 Encounters:   03/11/19 98.1 °F (36.7 °C) (Oral)   02/26/19 98.8 °F (37.1 °C) (Oral)     WHITE BLOOD CELL COUNT  WBC   Date Value Ref Range Status   03/11/2019 (LL) 4.0 - 10.5 K/CU MM Final    0.7  WBC CALLED TO Noé Dang RN ON 3/11/19 AT 0648 BY RITESH RHODES [facility]  RESULTS READ BACK     03/10/2019 0.9 (LL) 4.0 - 10.5 K/CU MM Final   02/26/2019 2.0 (L) 4.0 - 10.5 K/CU MM Final   02/25/2019 2.6 (L) 4.0 - 10.5 K/CU MM Final     Recent Labs     03/10/19  2128 03/11/19  0618   BUN 7 5*   CREATININE 0.5* 0.5*     Estimated Creatinine Clearance: 115 mL/min (A) (based on SCr of 0.5 mg/dL (L)). Intake/Output Summary (Last 24 hours) at 3/11/2019 1329  Last data filed at 3/11/2019 0243  Gross per 24 hour   Intake 300 ml   Output --   Net 300 ml       Pertinent Cultures:  Date    Source    Results  3/10   Blood    Pending  3/10   Viral PCR   Influenza A positive    Previous vancomycin levels:  TROUGH:  No results for input(s): VANCOTROUGH in the last 72 hours. RANDOM:  No results for input(s): VANCORANDOM in the last 72 hours. Assessment/Plan:  Ms. Yvrose Grubbs is a 59 yoF with ht = 160 cm, wt = 78.2 kg, Scr = 0.5 and CrCl estimate = 115 ml/min. Patient is noted to be neutropenic s/p chemotherapy. Based on current patient parameters, plan to administer vancomycin 1500 mg IVPB loading dose x1, followed by vancomycin 1250 mg (16 mg/kg) IVPB every 12 hours.   Plan to check a trough level: 03-13-19 @ 0030. Pharmacy will continue to follow and make dose adjustments as appropriate.       Thank you for the consult,    Rudolph Edge, PharmD, [facility]

## 2019-03-11 NOTE — CARE COORDINATION
LSW reviewed chart/into room to initiate discharge planning. Pt lives at home with spouse. Pt does not drive. Pt spouse provides the transportation. Pt has PCP. Pt has no DME or HC in the home. Pt has med/Rx insurance and is able to afford Rx. LSW informed Pt that LSW is here if Pt has any new needs. Pt plan is home with n needs.      Electronically signed by COMPA Ingram on 3/11/2019 at 3:38 PM

## 2019-03-11 NOTE — CONSULTS
Nephrology Service Consultation        [address], [address] [place] [address]  Phone: [phone]  Office Hours: 8:30AM - 4:30PM  Monday - Friday           Patient: Catarina Lincoln  MRN: [id number]  Consulting physician:  Denver Gore MD  Reason for Consult: hyponatremia      PCP: Georgi Carlin MD    HISTORY OF PRESENT ILLNESS:   -The patient is a 59 y.o. female with NSCLC s/p last dose of induction therapy on 3/1 who presented with increased shortness of breath. She was found to have influenza A and is on tamiflu. She is also neutropenic.  -Renal consult for serum sodium 124 on admission and K of 2.9. She denies diuretic use at home and only takes synthroid, vitamins and statin. She has not had any diarrhea nor profuse vomiting before admission (only vomited once). She was able to eat a fish sandwich and some fruits this am.  - at bedside    REVIEW OF SYSTEMS:  14 point ROS is Negative.  See positive ROS per HPI    Past Medical History:        Diagnosis Date    Cancer [facility])     lung cancer (dx in 2017), gone to liver    Hypothyroidism        Past Surgical History:        Procedure Laterality Date    CHOLECYSTECTOMY      HYSTERECTOMY, TOTAL ABDOMINAL      LOBECTOMY  2002    right upper    PILONIDAL CYST EXCISION         Medications:   Scheduled Meds:   lactobacillus  1 capsule Oral Daily with breakfast    aspirin  325 mg Oral Daily    b complex vitamins  1 capsule Oral Nightly    vitamin D  2,000 Units Oral Daily    levothyroxine  175 mcg Oral Daily    niacin  500 mg Oral Nightly    sodium chloride flush  10 mL Intravenous 2 times per day    enoxaparin  40 mg Subcutaneous Daily    ipratropium-albuterol  1 ampule Inhalation Q4H WA    guaiFENesin  600 mg Oral BID    oseltamivir  75 mg Oral BID    cefepime  2 g Intravenous Q12H    therapeutic multivitamin-minerals  1 tablet Oral Daily    tbo-filgrastim  300 mcg Subcutaneous QPM    vancomycin  1,500 mg Intravenous Once  [START ON 3/12/2019] vancomycin  1,250 mg Intravenous Q12H     Continuous Infusions:   sodium chloride 75 mL/hr at 03/11/19 0231    sodium chloride 30 mL/hr at 03/11/19 0231     PRN Meds:.ondansetron, prochlorperazine, sodium chloride flush, magnesium hydroxide, ondansetron, guaiFENesin-dextromethorphan, potassium chloride **OR** potassium alternative oral replacement **OR** potassium chloride, benzonatate    Allergies:  Amoxicillin-pot clavulanate; Prednisone & diphenhydramine; Sertraline hcl; and Codeine    Social History:   TOBACCO:   reports that she has never smoked. She has never used smokeless tobacco.  ETOH:   reports that she does not drink alcohol. OCCUPATION:      Family History:   History reviewed. No pertinent family history.         Physical Exam:    Vitals: BP (!) 150/78   Pulse 89   Temp 98.1 °F (36.7 °C) (Oral)   Resp 19   Ht 5' 3\" (1.6 m)   Wt 172 lb 6.4 oz (78.2 kg)   SpO2 98%   BMI 30.54 kg/m²   General appearance: in no acute distress, appears stated age  Skin: Skin color, texture, turgor normal. No rashes or lesions  HEENT: normocephalic, atraumatic  Neck: supple, trachea midline  Lungs: +wheezing, breathing comfortably  Heart[de-identified] regular rate and rhythm, S1, S2 normal,  Abdomen: soft, non-tender; bowel sounds normal; no masses,   Extremities: extremities normal, atraumatic, no cyanosis or edema  Neurologic: Mental status: alert, oriented, interactive, following commands  Psychiatric: mood and affect appropriate    CBC:   Recent Labs     03/10/19  2128 03/11/19  0618   WBC 0.9* 0.7  WBC CALLED TO Maria Ines Levine RN ON 3/11/19 AT 0648 BY RITESH RHODES [facility]  RESULTS READ BACK  *   HGB 7.2* 7.0*   PLT 54  RESULTS CONFIRMED BY SMEAR REVIEW  * 37*     BMP:    Recent Labs     03/10/19  2128 03/11/19  0618   * 133*   K 2.9  K CALLED TO JAYESH MAXWELL [id number] [facility]   RESULTS READ BACK  * 3.9   CL 89* 100   CO2 25 25   BUN 7 5*   CREATININE 0.5* 0.5*   GLUCOSE 109* 107*     Hepatic: Recent Labs     03/10/19  2128   AST 26   ALT 20   BILITOT 0.1   ALKPHOS 73     Troponin: No results for input(s): TROPONINI in the last 72 hours. BNP: No results for input(s): BNP in the last 72 hours. Lipids: No results for input(s): CHOL, HDL in the last 72 hours. Invalid input(s): LDLCALCU  ABGs: No results found for: PHART, PO2ART, DYG3MDV  INR: No results for input(s): INR in the last 72 hours.   -----------------------------------------------------------------      Assessment and Recommendations   Hyponatremia: 124 on admission, ddx; volume depletion, excess ADH from lung cancer  Hypokalemia  NSCLC with brainstem mets: chemo and radiation  Hypothyroidism  Neutropenia s/p chemo    Plan:  Serum sodium improved with IVF so it was likely from volume depletion  Continue NS but add K to the bag to prevent recurrent hyperkalemia, 50ml/hr  1800ml oral fluid restriction  Thank you            Electronically signed by Karis Heredia DO on 3/11/2019 at 1:41 PM    MD Nadeem Sparks DO [address] [place] [address]  PHONE: [phone]  FAX: [phone]

## 2019-03-11 NOTE — ED PROVIDER NOTES
eMERGENCY dEPARTMENT eNCOUnter        [address] [facility]    Chief Complaint   Patient presents with    Cough    Shortness of Breath       HPI    Jelani Jass Lincoln is a 59 y.o. female who presents with cough, chest congestion, shortness of breath. Onset:  4 days ago. Constant and worsening since onset. Cough is non-productive. She had fevers on Thursday but none since. Went to the cancer center 2 days ago and was given fluids. Yesterday  called and they advised her to take Robitussin--which she has been doing without relief. Denies chest pain. Denies abdominal pain, n/v/d. She has a history of metastatic lung cancer. Completed chemotherapy 2 weeks ago. REVIEW OF SYSTEMS    See HPI for further details. Review of systems otherwise negative. Constitutional:  Denies fever. HENT:  Denies headache. Neck:  Denies neck pain. Respiratory:  + cough, sob. Cardiovascular:  Denies cp. GI:  Denies nausea, vomiting, diarrhea. Musculoskeletal:  Denies extremity pain. Integument:  Denies rashes.     PAST MEDICAL HISTORY    Past Medical History:   Diagnosis Date    Cancer [facility])     lung cancer (dx in 2017), gone to liver    Hypothyroidism        SURGICAL HISTORY    Past Surgical History:   Procedure Laterality Date    CHOLECYSTECTOMY      HYSTERECTOMY, TOTAL ABDOMINAL      LOBECTOMY  2002    right upper    PILONIDAL CYST EXCISION         CURRENT MEDICATIONS    Current Outpatient Rx   Medication Sig Dispense Refill    atorvastatin (LIPITOR) 10 MG tablet Take 1 tablet by mouth nightly 30 tablet 0    Garlic 6446 MG CAPS Take 1 capsule by mouth nightly      Cinnamon 500 MG TABS Take 1 tablet by mouth nightly      B Complex-Biotin-FA (B-COMPLEX PO) Take 1 each by mouth nightly      TURMERIC PO Take 1 each by mouth nightly      aspirin 325 MG tablet Take by mouth      Cholecalciferol 1000 units CHEW Take 2,000 Units by mouth      coenzyme Q10 60 MG CAPS Take 60 mg by mouth nightly       RA KRILL OIL sexual activity: None   Other Topics Concern    None   Social History Narrative    None       PHYSICAL EXAM    VITAL SIGNS: /62   Pulse 77   Temp 98.1 °F (36.7 °C) (Oral)   Resp 22   Ht 5' 3\" (1.6 m)   Wt 170 lb (77.1 kg)   SpO2 100%   BMI 30.11 kg/m²   GENERAL:  Well-developed female in no acute distress. EYES:   PERRL, EOMI. Conjunctiva normal.  HENT:  NC/AT. External ears normal.  Nares patent. Oropharynx moist.  NECK:  Supple. No meningeal signs. LUNGS:  Lungs with wheezing and rhonchi throughout. Respirations unlabored. + harsh cough observed. CARDIAC:   RRR. ABDOMEN:  Abdomen soft, non-tender. EXTREMITIES:   No deformities. SKIN:   Warm and dry. NEURO:  Alert and oriented. LYMPH:  No lymphadenopathy.     RADIOLOGY/PROCEDURES    Labs Reviewed   RAPID INFLUENZA A/B ANTIGENS - Abnormal; Notable for the following components:       Result Value    Rapid Influenza A Ag POSITIVE (*)     All other components within normal limits   CBC WITH AUTO DIFFERENTIAL - Abnormal; Notable for the following components:    WBC 0.9 (*)     RBC 2.18 (*)     Hemoglobin 7.2 (*)     Hematocrit 21.7 (*)     MCH 33.0 (*)     Platelets   (*)     Value: 54  RESULTS CONFIRMED BY SMEAR REVIEW      MPV 11.8 (*)     Monocytes % 12.0 (*)     All other components within normal limits   COMPREHENSIVE METABOLIC PANEL - Abnormal; Notable for the following components:    Sodium 124 (*)     Potassium   (*)     Value: 2.9  K CALLED TO MCCONNELL DO [id number] [facility]   RESULTS READ BACK      Chloride 89 (*)     CREATININE 0.5 (*)     Glucose 109 (*)     Calcium 7.8 (*)     Total Protein 6.3 (*)     All other components within normal limits   CULTURE BLOOD #1   CULTURE BLOOD #1   BRAIN NATRIURETIC PEPTIDE   TROPONIN   LACTIC ACID, PLASMA     Xr Chest Standard (2 Vw)    Result Date: 3/10/2019  EXAMINATION: TWO VIEWS OF THE CHEST 3/10/2019 10:15 pm COMPARISON: 02/25/2019 HISTORY: ORDERING SYSTEM PROVIDED HISTORY: Chest pain TECHNOLOGIST PROVIDED HISTORY: Reason for exam:->Chest pain Ordering Physician Provided Reason for Exam: chest pain Acuity: Unknown Type of Exam: Unknown FINDINGS: Left chest Port-A-Cath in place unchanged. The cardiac silhouette is within normal limits for size. The pulmonary vasculature is within normal limits. There is no focal consolidation, pleural effusion or pneumothorax. The visualized osseous structures demonstrate no acute abnormality. Thoracolumbar scoliosis again noted. No acute cardiopulmonary abnormality. Xr Chest Standard (2 Vw)    Result Date: 2/25/2019  EXAMINATION: TWO VIEWS OF THE CHEST 2/25/2019 9:27 am COMPARISON: 02/11/2019 HISTORY: ORDERING SYSTEM PROVIDED HISTORY: Chest pain TECHNOLOGIST PROVIDED HISTORY: Reason for exam:->Chest pain Ordering Physician Provided Reason for Exam: midsternal chest pain started this morning; hypertension Acuity: Acute Type of Exam: Initial Relevant Medical/Surgical History: lung cancer; RUL lobectomy FINDINGS: Accessed chemo port tip projects over the brachiocephalic caval junction. Clips are noted in the right upper lobe. Mild airspace disease is noted in the right costophrenic angle. Thoracic degenerative changes are noted. Old compression deformity at the T6 level demonstrates increased sclerosis. No focal consolidation. Mild airspace disease versus technical rotation at the right costophrenic angle. Unremarkable otherwise. Ct Soft Tissue Neck W Contrast    Result Date: 2/11/2019  EXAMINATION: CT OF THE NECK SOFT TISSUE WITH CONTRAST  2/11/2019 TECHNIQUE: CT of the neck was performed with the administration of intravenous contrast. Multiplanar reformatted images are provided for review. Dose modulation, iterative reconstruction, and/or weight based adjustment of the mA/kV was utilized to reduce the radiation dose to as low as reasonably achievable. COMPARISON: 10/22/2018.  HISTORY: ORDERING SYSTEM PROVIDED HISTORY: Cancer of bronchus of right upper lobe [facility]) Follow-up examination. FINDINGS: PHARYNX/LARYNX:  The parapharyngeal fat spaces are preserved. There are calcifications in the right palatine tonsil, likely related to sequela of remote infection. The base of the tongue, vallecula, and epiglottis are unremarkable. The true vocal cords are normal in appearance. No laryngeal or pharyngeal mass is identified. SALIVARY GLANDS/THYROID:  No appreciable thyroid tissue is identified. The submandibular glands and parotid glands are normal in appearance. LYMPH NODES:  There is no cervical lymphadenopathy. SOFT TISSUES:  Vascular calcifications are noted in the proximal internal carotid arteries bilaterally. No appreciable soft tissue swelling. No focal fluid collection. BRAIN/ORBITS/SINUSES:  Limited images through the cerebral and cerebellar parenchyma are unremarkable. The orbits are normal in appearance. The paranasal sinuses and mastoid air cells are clear. LUNG APICES/SUPERIOR MEDIASTINUM:  Postsurgical changes are noted in the right upper lobe with associated areas of scarring. No focal lung infiltrate. Vascular calcifications are noted along the aorta and its branch vessels. No superior mediastinal lymphadenopathy. BONES:  Degenerative changes are noted in the spine. The patient is edentulous. 1. No evidence of metastatic disease in the neck. 2. Postsurgical changes are noted in the right lung apex, stable. Ct Chest W Contrast    Result Date: 2/11/2019  EXAMINATION: CT OF THE ABDOMEN AND PELVIS WITH CONTRAST; CT OF THE CHEST WITH CONTRAST 2/11/2019 10:50 am TECHNIQUE: CT of the abdomen and pelvis was performed with the administration of intravenous contrast. Multiplanar reformatted images are provided for review.  Dose modulation, iterative reconstruction, and/or weight based adjustment of the mA/kV was utilized to reduce the radiation dose to as low as reasonably achievable.; CT of the chest was performed with the administration of intravenous contrast. Multiplanar reformatted images are provided for review. Dose modulation, iterative reconstruction, and/or weight based adjustment of the mA/kV was utilized to reduce the radiation dose to as low as reasonably achievable. COMPARISON: PET/CT dated October 22, 2018. CT dated October 8, 2018. HISTORY: ORDERING SYSTEM PROVIDED HISTORY: Cancer of bronchus of right upper lobe (HCC) TECHNOLOGIST PROVIDED HISTORY: Additional Contrast?->Oral Ordering Physician Provided Reason for Exam: gb, hysterectomy, lobectomy, 75 ml isovue 370, 50 ml omnipaque plus water, f/u lung ca Acuity: Chronic Type of Exam: Ongoing Additional signs and symptoms: gb, hysterectomy, lobectomy, 75 ml isovue 370, 50 ml omnipaque plus water, f/u lung ca Relevant Medical/Surgical History: gb, hysterectomy, lobectomy, 75 ml isovue 370, 50 ml omnipaque plus water, f/u lung ca; ORDERING SYSTEM PROVIDED HISTORY: Cancer of bronchus of right upper lobe (HCC) FINDINGS: Chest: Mediastinum: The heart is normal in size without a pericardial effusion. Severe coronary artery atherosclerosis. The aorta, arch branches, and main pulmonary artery are normal in course and caliber. No saddle embolus. No discrete pathologically enlarged lymph nodes. Infiltrating soft tissue along the right hilar region is unchanged from October 8, 2018. Lungs/pleura: Moderate emphysema. Post treatment changes are identified in the right perihilar region, stable since October 2018. No obvious CT evidence of local recurrence. No new suspicious pulmonary nodules. The left lung is clear without focal consolidation, pleural effusion, or pulmonary nodularity. Central airways are patent. Attenuation of the right sided airways is seen secondary to post treatment changes. No endobronchial mass. Soft Tissues/Bones: No pathologically enlarged lower neck or axillary lymph nodes. Diffuse osteopenia. Scoliosis is identified.  Compression deformity of T6 is seen with underlying sclerosis. No additional suspicious osseous lesions. Abdomen/Pelvis: Organs: Patient's known liver metastases in segment 7 and segment 6 are not identified on the current exam.  No new liver lesions are appreciated. Status post cholecystectomy. No intra or extrahepatic biliary dilatation. The spleen, pancreas, adrenal glands, and kidneys demonstrate no acute abnormality. Bilateral ureters are normal in course and caliber. No ureteral calculi. GI/bowel: The stomach, small bowel, and colon are normal in course and caliber without evidence of wall thickening or obstruction. Pelvis: Normal bladder. Status post hysterectomy. No suspicious adnexal masses. Peritoneum/Retroperitoneum: No free fluid or free air. No pathologic lymphadenopathy. Aorta and its branches are normal in course and caliber. Severe atherosclerosis. Bones/Soft Tissues: No acute or aggressive osseous lesion of the lumbar spine and pelvis. Severe levoscoliosis is seen. 1. Patient's known liver metastases are not appreciated on today's CT scan, compatible with response to treatment. 2. Increased sclerosis involving the compression deformity of T6. Finding is highly suspicious for pathologic fracture related to metastatic disease. No additional osseous metastases are seen. 3. Post treatment changes identified in the right perihilar region. No CT evidence of local recurrence. 4. Severe atherosclerosis. 5. Cholecystectomy and hysterectomy. Ct Abdomen Pelvis W Iv Contrast Additional Contrast? Oral    Result Date: 2/11/2019  EXAMINATION: CT OF THE ABDOMEN AND PELVIS WITH CONTRAST; CT OF THE CHEST WITH CONTRAST 2/11/2019 10:50 am TECHNIQUE: CT of the abdomen and pelvis was performed with the administration of intravenous contrast. Multiplanar reformatted images are provided for review.  Dose modulation, iterative reconstruction, and/or weight based adjustment of the mA/kV was utilized to reduce the radiation dose to as low as reasonably achievable.; CT of the chest was performed with the administration of intravenous contrast. Multiplanar reformatted images are provided for review. Dose modulation, iterative reconstruction, and/or weight based adjustment of the mA/kV was utilized to reduce the radiation dose to as low as reasonably achievable. COMPARISON: PET/CT dated October 22, 2018. CT dated October 8, 2018. HISTORY: ORDERING SYSTEM PROVIDED HISTORY: Cancer of bronchus of right upper lobe (HCC) TECHNOLOGIST PROVIDED HISTORY: Additional Contrast?->Oral Ordering Physician Provided Reason for Exam: gb, hysterectomy, lobectomy, 75 ml isovue 370, 50 ml omnipaque plus water, f/u lung ca Acuity: Chronic Type of Exam: Ongoing Additional signs and symptoms: gb, hysterectomy, lobectomy, 75 ml isovue 370, 50 ml omnipaque plus water, f/u lung ca Relevant Medical/Surgical History: gb, hysterectomy, lobectomy, 75 ml isovue 370, 50 ml omnipaque plus water, f/u lung ca; ORDERING SYSTEM PROVIDED HISTORY: Cancer of bronchus of right upper lobe (HCC) FINDINGS: Chest: Mediastinum: The heart is normal in size without a pericardial effusion. Severe coronary artery atherosclerosis. The aorta, arch branches, and main pulmonary artery are normal in course and caliber. No saddle embolus. No discrete pathologically enlarged lymph nodes. Infiltrating soft tissue along the right hilar region is unchanged from October 8, 2018. Lungs/pleura: Moderate emphysema. Post treatment changes are identified in the right perihilar region, stable since October 2018. No obvious CT evidence of local recurrence. No new suspicious pulmonary nodules. The left lung is clear without focal consolidation, pleural effusion, or pulmonary nodularity. Central airways are patent. Attenuation of the right sided airways is seen secondary to post treatment changes. No endobronchial mass. Soft Tissues/Bones: No pathologically enlarged lower neck or axillary lymph nodes. Diffuse osteopenia. Scoliosis is identified. Compression deformity of T6 is seen with underlying sclerosis. No additional suspicious osseous lesions. Abdomen/Pelvis: Organs: Patient's known liver metastases in segment 7 and segment 6 are not identified on the current exam.  No new liver lesions are appreciated. Status post cholecystectomy. No intra or extrahepatic biliary dilatation. The spleen, pancreas, adrenal glands, and kidneys demonstrate no acute abnormality. Bilateral ureters are normal in course and caliber. No ureteral calculi. GI/bowel: The stomach, small bowel, and colon are normal in course and caliber without evidence of wall thickening or obstruction. Pelvis: Normal bladder. Status post hysterectomy. No suspicious adnexal masses. Peritoneum/Retroperitoneum: No free fluid or free air. No pathologic lymphadenopathy. Aorta and its branches are normal in course and caliber. Severe atherosclerosis. Bones/Soft Tissues: No acute or aggressive osseous lesion of the lumbar spine and pelvis. Severe levoscoliosis is seen. 1. Patient's known liver metastases are not appreciated on today's CT scan, compatible with response to treatment. 2. Increased sclerosis involving the compression deformity of T6. Finding is highly suspicious for pathologic fracture related to metastatic disease. No additional osseous metastases are seen. 3. Post treatment changes identified in the right perihilar region. No CT evidence of local recurrence. 4. Severe atherosclerosis. 5. Cholecystectomy and hysterectomy.      Nm Myocardial Spect Rest Exercise Or Rx    Result Date: 2/26/2019  Cardiac Perfusion Imaging   Demographics   Patient Name      [facility]     Date of study        02/26/2019   Date of Birth     1955         Gender               Female   Age               59 year(s)         Race                    Patient Number    [id number]         Room Number          3628   Visit Number      [id number] Height               63 inches   Corporate ID      [id number]           Weight               174 pounds   Accession Number  [id number]                                        NM Technologist      Mahamed Youngblood RT   Ordering          Bulmaro Drew   Interpreting         Annette Oreilly [id number]         Benedicto Cano MD      Cardiologist         Benedicto Kirby MD   Conclusions   Summary  Normal perfusion study with normal distribution in all coronal, short, and  horizontal axis. The observed defect is consistent with diaphragmatic attenuation. Normal LV function. LVEF is > 70 %. Recommendation  Medical management. Signatures   ------------------------------------------------------------------  Electronically signed by Ramsey Kirby MD (Interpreting  cardiologist) on 02/26/2019 at 19:42  ------------------------------------------------------------------  Procedure Procedure Type:   Nuclear Stress Test:Pharmacological, Myocardial Perfusion Imaging with  Pharm, NM MYOCARDIAL SPECT REST EXERCISE OR RX  Indications: Chest pain and arrhythmia. Risk Factors   The patient risk factors include:chronic lung disease. Stress Protocols   Resting ECG  Normal sinus rhythm. frequent PVCs. Resting HR:109 bpm  Resting BP:149/79 mmHg  Stress Protocol:Pharmacologic - Lexiscan  Peak HR:139 bpm                            HR/BP product:22535  Peak BP:140/61 mmHg  Predicted HR: 158 bpm  % of predicted HR: 88   Exercise duration: 01:08 min  Reason for termination:Completed   ECG Findings  No ECG changes compared to rest.   Arrhythmias  Frequent PVCs   Symptoms  Shortness of breath. Complications  Procedure complication was none. Stress Interpretation  The stress ECG was non-diagnostic secondary to base line abnormalities. Procedure Medications   - Lexiscan I.V. bolus (over 15sec.) 0.4 mg admininstered @ 02/26/2019 12:20.   Imaging Protocols Rest                             Stress   Isotope:Sestamibi 99mTc          Isotope: Sestamibi 99mTc  Isotope dose:10.3 mCi            Isotope dose:26.8 mCi  Administration route: I.V. Administration route: I.V. Injection Date:02/26/2019 10:35  Injection Date:02/26/2019 12:20  Scan Date:02/26/2019 11:20       Scan Date:02/26/2019 13:05   Perfusion Interpretation   Decreased uptake inferiorly due to diaphragmatic artifact. Imaging Results    Summed scores     - Summed stress score: 4     - Summed rest score: 1     - Summed difference score:    3   Rest ejection  Ejection fraction:75 %  EDV :60 ml  ESV :15 ml  Stroke volume :45 ml  Medical History   Accession#:  [id number]  Admission Data Admission date: 02/25/2019 Admission Time: 09:05 Hospital Status: Outpatient. ED COURSE & MEDICAL DECISION MAKING    Pertinent Labs & Imaging studies reviewed. (See chart for details)  -  Patient seen and evaluated in the emergency department. -  Triage and nursing notes reviewed and incorporated. -  Old chart records reviewed and incorporated. -  Supervising physician was Dr. Germaine Gates. She saw and examined patient. -  Differential diagnosis includes: upper respiratory infection, sinusitis, influenza, pneumonia, mononucleosis, and others  -  Work-up included: see above  -  Patient treated with NS, Tamiflu, Potassium, Duonebs in the ED.  -  Results discussed with patient and  by Dr. Germaine Gates. + influenza A, pancytopenia, hyponatremia, and hypokalemia. CXR is clear. Vital signs are stable. I discussed the case with hospitalist, Dr. Juany Coffman, who will admit for further management. FINAL IMPRESSION    1. Influenza A    2. Hyponatremia    3. Hypokalemia    4.  Pancytopenia [facility] CenterBright Hernandez PA-C  03/10/19 [id number]

## 2019-03-11 NOTE — ED NOTES
Report called to [facility]. Patient to go to 1108 instead of 1109.      Aislinn Bass RN  03/11/19 0020

## 2019-03-11 NOTE — CARE COORDINATION
CM review of pt chart for potential readmission with in 30 days. No alternatives to admission at this time. . Pt diagnosed with metastatic lung cancer who presented with cough, shortness of breath. She is found be influenza positive. White blood cell count is low, potassium is low, sodium is low.  SOPHIE,RN/CM

## 2019-03-11 NOTE — PROGRESS NOTES
HOSPITALIST    Influenza A with chest congestion  -antiviral: Tamiflu  -antibacterial: CXR negative but has chest congestion - give one dose cefepime tonight since she is neutropenic and reassess in am    COPD exacerbation  -bronchodilators: Duoenbs  -steroids: none - patient does not want them  -pulmonologist: does not have one, if not better can place cs    Hyponatremia - 124  -cs nephro  -IVF at 30 mL per hour    Hypokalemia - K+ 2.9  -supplement    Neutropenia d/t chemo  -cs oncology  -neutropenic precautions    Pancytopenia due to chemo    NSCLC stage IIIa with reucrrence  -completed 6 cycles chemo, to start maintenance    Brainstem met  -treated with XRT at [facility]

## 2019-03-11 NOTE — ED PROVIDER NOTES
I independently examined and evaluated Jose Lincoln. In brief their history revealed cough, shortness of breath for the past 4 days. Reports fever on Thursday. She just finished course of chemotherapy for metastatic lung cancer. Their focused exam revealed awake, alert, well-hydrated, well-nourished, and in no acute distress. Mucous membranes are moist. Speech is clear. Breathing is unlabored. Skin is dry. Mental status is normal. The patient moves all extremities, and is without facial droop. This EKG was interpreted by me. Rate is 79, rhythm is sinus. CO and QT intervals are within normal limits. There is no ST segment or T wave changes. T-wave is inverted in lead 3. Nonspecific ST abnormality. Occasional PVCs. This EKG was compared to previous EKG from date 2/25/19    ED course: 71-year-old female with metastatic lung cancer who presented with cough, shortness of breath. She is found be influenza positive. White blood cell count is low, potassium is low, sodium is low. IV fluid resuscitation was initiated, potassium and resuscitation initiated, Tamiflu given and plan will be admission to the hospital for further evaluation and care. All diagnostic, treatment, and disposition decisions were made by myself in conjunction with the Advanced Practice Provider. For all further details of the patient's emergency department visit, please see the Advanced Practice Provider's documentation.       Christen Vela DO  03/10/19 6002

## 2019-03-11 NOTE — PROGRESS NOTES
Hospitalist Progress Note      Name:  Windy Lincoln /Age/Sex: 1955  (59 y.o. female)   MRN & CSN:  [id number] & [id number] Admission Date/Time: 3/10/2019  9:03 PM   [facility]  [facility] PCP: Katie Whittaker MD         Hospital Day: 2    Assessment and Plan:   Influenza A with chest congestion, ? Pneumonia  -antiviral: Tamiflu  -CXR negative but has chest congestion   -Vanc cefepime as patient is neutropenic on chemo  -Tessalon pearls     COPD exacerbation  -bronchodilators: Duoenbs  -steroids: none - patient does not want them  -consult to pulm as patient does not have     Hyponatremia - resolved with IV fluids  -cs nephro  -IVF at 30 mL per hour     Hypokalemia - K+ 2.9, improved   -supplement     Neutropenia d/t chemo  -cs oncology  -neutropenic precautions     Pancytopenia due to chemo     NSCLC stage IIIa with reucrrence  -completed 6 cycles chemo, to start maintenance     Brainstem met  -treated with XRT at [facility]      History of Present Illness:     Chief Complaint: 59year old female, PMG lung cancer s/p chemo. Presented with shortness of breath and body aches. Found to have influenza    Patient seen this am.  Continues to have shortness of breath. Denies any chills or fevers home. Ten point ROS reviewed negative, unless as noted above    Objective: Intake/Output Summary (Last 24 hours) at 3/11/2019 1235  Last data filed at 3/11/2019 0243  Gross per 24 hour   Intake 300 ml   Output --   Net 300 ml      Vitals:   Vitals:    19 1123   BP:    Pulse:    Resp: 19   Temp:    SpO2: 98%     Physical Exam:   General: A&Ox4, no acute distress  HENT: atraumatic, normal cephalic  Eyes: PERRLA, EOMI  CVS: S1, S2 WNL, RRR,  No murmurs rubs or gallops  Lungs: course rhonchi and crackles with expiratory wheeze throughout the  Abdomen: soft, non-tender.   BSx4Q  Neuro: CN2-12 grossly intact, no focal deficits  Psych: appropriate mood and affect   Medications:   Medications:    lactobacillus  1 capsule Oral Daily with breakfast    aspirin  325 mg Oral Daily    b complex vitamins  1 capsule Oral Nightly    vitamin D  2,000 Units Oral Daily    levothyroxine  175 mcg Oral Daily    niacin  500 mg Oral Nightly    sodium chloride flush  10 mL Intravenous 2 times per day    enoxaparin  40 mg Subcutaneous Daily    ipratropium-albuterol  1 ampule Inhalation Q4H WA    guaiFENesin  600 mg Oral BID    oseltamivir  75 mg Oral BID    cefepime  2 g Intravenous Q12H    therapeutic multivitamin-minerals  1 tablet Oral Daily    tbo-filgrastim  300 mcg Subcutaneous QPM    vancomycin  1,500 mg Intravenous Once    [START ON 3/12/2019] vancomycin  1,250 mg Intravenous Q12H      Infusions:    sodium chloride 75 mL/hr at 03/11/19 0231    sodium chloride 30 mL/hr at 03/11/19 0231     PRN Meds:   ondansetron 8 mg Daily PRN   prochlorperazine 10 mg Q6H PRN   sodium chloride flush 10 mL PRN   magnesium hydroxide 30 mL Daily PRN   ondansetron 4 mg Q6H PRN   guaiFENesin-dextromethorphan 5 mL Q4H PRN   potassium chloride 40 mEq PRN   Or     potassium alternative oral replacement 40 mEq PRN   Or     potassium chloride 10 mEq PRN         Electronically signed by Lit Dukes MD on 3/11/2019 at 12:35 PM

## 2019-03-11 NOTE — H&P
History and Physical      Name:  Collette Lincoln /Age/Sex: 1955  (59 y.o. female)   MRN & CSN:  [id number] & [id number] Admission Date/Time: 3/10/2019  9:03 PM   [facility]  [facility] PCP: Raymona Mohs, MD       Hospital Day: 1    Assessment and Plan: Collette Lincoln is a 59 y.o.  female  who presents with Cough and Shortness of Breath    Influenza A with chest congestion  -antiviral: Tamiflu will be started even though onset of symptoms was 4 days ago since this is a high risk situation  -antibacterial: CXR negative but has chest congestion - give one dose cefepime tonight since she is neutropenic and reassess in am     COPD exacerbation  -diagnosis: patient self reports \"I have a touch of emphysema\"  -bronchodilators: Duoenbs  -steroids: none - patient does not want them  -pulmonologist: does not have one, if not better can place cs     Hyponatremia - 124  -etiology may be in part due to GI loss - she vomited yesterday  -cs nephro  -IVF at 30 mL per hour     Hypokalemia - K+ 2.9  -supplement     Neutropenia d/t chemo  -cs oncology  -neutropenic precautions     Pancytopenia due to chemo  -HGB 7.2 - may need PRBC  -Platelet count 54 - monitor  -monitor CBC daily     NSCLC stage IIIa with recurrence  -completed 6 cycles chemo, to start maintenance chemo soon     Brainstem metastasis  -treated with [facility] at [facility]    DVT prophylaxis  -Lovenox if platelet count acceptable    Lung cancer details: - on carboplatin/etoposide chemo with Dr. Contreras Orellana - has had 5 treatments  -she had RUL lobectomy in  for prior lung cancer  -current lung ca dx 2017 & finished chemo Oct 2017 but just completed another round of chemo for recurrence  -MRI brain in March showed a lesion and it was treated with XRT     Obesity Body mass index is 30.54 kg/m². Hyperlipidemia -  recent admit - does not want Lipitor as she is concerned about muscle weakness side effect.       Hypothyroidism - TSH was recent 13 and she was advised to take Synthroid in the am 1/2 hour before food and 2 hours before taking any other meds - she did not want her dose adjusted and said she would follow up with her PCP    Recent admit with CP - stress test was negative just a few weeks ago    Primary prevention: on ASA therapy - continue but hold if platelet count becomes very low       History of Present Illness:     Chief Complaint:   Christiana Fniley is a 59 y.o.  female  who presents with shortness of breath    Patient here with dyspnea    -onset: about 4 days ago  -intensity: \"I was quite short of breath\"   -associated symptoms: chest congestion, but not able to bring up phlegm  -alleviating factors: given breathing treatments in ER and felt better    Had chemo recently    Visited the cancer center 3/8 (2 days after the onset of sx) and given IVF and IV Pepcid per     Has CP due to bad cough - and states can only take shallow breaths due to cough    Quit smoking in 2002    Code status: she wants to be a full code    Surrogate decision maker:  Courtney Smith who is at the bedside    Pain level: 0/10 by Hayes-Baker scale when I was seeing her     10-14 point ROS reviewed negative, unless as noted above  -no paralysis or bleeding reported    Objective:   No intake or output data in the 24 hours ending 03/10/19 2308   Vitals:   Vitals:    03/10/19 2147   BP:    Pulse:    Resp: 22   Temp:    SpO2: 100%     Physical Exam:      Physical Exam   Constitutional: She is oriented to person, place, and time. Body mass index is 30.54 kg/m². Well developed   HENT:   Head: Normocephalic. Eyes: EOM are normal. Right eye exhibits no discharge. Left eye exhibits no discharge. Cardiovascular: Normal rate and regular rhythm. Pulmonary/Chest: Effort normal. She has wheezes. Abdominal: Soft. Neurological: She is alert and oriented to person, place, and time. Skin: She is not diaphoretic. There is pallor. Psychiatric: She has a normal mood and affect.        Past Medical History: PMHx:   Past Medical History:   Diagnosis Date    Cancer [facility])     lung cancer (dx in 2017), gone to liver    Hypothyroidism    No Patient Care Coordination Note on file. Patient Active Problem List    Diagnosis Date Noted    Influenza A 03/10/2019    Chest pain 02/25/2019    Primary malignant neoplasm of lung metastatic to other site [facility]) 12/10/2018       PSHx:  has a past surgical history that includes Cholecystectomy; Hysterectomy, total abdominal; Pilonidal cyst excision; and lobectomy (2002). Allergies: Allergies   Allergen Reactions    Amoxicillin-Pot Clavulanate Diarrhea    Prednisone & Diphenhydramine Other (See Comments)     All steroids. Patient can't walk, has nausea and diarrhea.     Sertraline Hcl Other (See Comments)    Morphine And Related Nausea And Vomiting and Nausea Only     Home Medications:      Imer Malagon   Home Medication Instructions [id number]    Printed on:03/11/19 0856   Medication Information                      Acidophilus Lactobacillus CAPS  Take by mouth             aspirin 325 MG tablet  Take by mouth             atorvastatin (LIPITOR) 10 MG tablet  Take 1 tablet by mouth nightly             B Complex-Biotin-FA (B-COMPLEX PO)  Take 1 each by mouth nightly             Cholecalciferol 1000 units CHEW  Take 2,000 Units by mouth             Cinnamon 500 MG TABS  Take 1 tablet by mouth nightly             coenzyme Q10 60 MG CAPS  Take 60 mg by mouth nightly              Garlic 1954 MG CAPS  Take 1 capsule by mouth nightly             levothyroxine (SYNTHROID) 175 MCG tablet  Take 175 mcg by mouth              Multiple Vitamins-Minerals (HAIR/SKIN/NAILS) TABS  Take 1 tablet by mouth daily              NIACIN CR PO  Take 1 tablet by mouth nightly              ondansetron (ZOFRAN-ODT) 8 MG TBDP disintegrating tablet  Take 8 mg by mouth as needed for Nausea              prochlorperazine (COMPAZINE) 10 MG tablet  Take 10 mg by mouth as needed RA KRILL OIL CAPS  Take 1 capsule by mouth nightly              TURMERIC PO  Take 1 each by mouth nightly               FHx: denies DVT  SHx:   Social History     Socioeconomic History    Marital status:      Spouse name: None    Number of children: None    Years of education: None    Highest education level: None   Occupational History    None   Social Needs    Financial resource strain: None    Food insecurity:     Worry: None     Inability: None    Transportation needs:     Medical: None     Non-medical: None   Tobacco Use    Smoking status: Never Smoker    Smokeless tobacco: Never Used   Substance and Sexual Activity    Alcohol use: No    Drug use: No    Sexual activity: None   Lifestyle    Physical activity:     Days per week: None     Minutes per session: None    Stress: None   Relationships    Social connections:     Talks on phone: None     Gets together: None     Attends Holiness service: None     Active member of club or organization: None     Attends meetings of clubs or organizations: None     Relationship status: None    Intimate partner violence:     Fear of current or ex partner: None     Emotionally abused: None     Physically abused: None     Forced sexual activity: None   Other Topics Concern    None   Social History Narrative    None     I spoke with the ER provider, and we decided to admit her since she has Influenza A    Na + is 124      Electronically signed by Lissette Moreno MD on 3/10/2019 at 11:08 PM

## 2019-03-12 LAB
ANION GAP SERPL CALCULATED.3IONS-SCNC: 9 MMOL/L (ref 4–16)
BUN BLDV-MCNC: 3 MG/DL (ref 6–23)
CALCIUM SERPL-MCNC: 7.9 MG/DL (ref 8.3–10.6)
CHLORIDE BLD-SCNC: 101 MMOL/L (ref 99–110)
CO2: 27 MMOL/L (ref 21–32)
CREAT SERPL-MCNC: 0.6 MG/DL (ref 0.6–1.1)
GFR AFRICAN AMERICAN: >60 ML/MIN/1.73M2
GFR NON-AFRICAN AMERICAN: >60 ML/MIN/1.73M2
GLUCOSE BLD-MCNC: 106 MG/DL (ref 70–99)
HCT VFR BLD CALC: 22.1 % (ref 37–47)
HEMOGLOBIN: 7.1 GM/DL (ref 12.5–16)
MAGNESIUM: 1.7 MG/DL (ref 1.8–2.4)
MCH RBC QN AUTO: 32.7 PG (ref 27–31)
MCHC RBC AUTO-ENTMCNC: 32.1 % (ref 32–36)
MCV RBC AUTO: 101.8 FL (ref 78–100)
PDW BLD-RTO: 15.4 % (ref 11.7–14.9)
PHOSPHORUS: 2.3 MG/DL (ref 2.5–4.9)
PLATELET # BLD: 33 K/CU MM (ref 140–440)
PMV BLD AUTO: 9.4 FL (ref 7.5–11.1)
POTASSIUM SERPL-SCNC: 3.7 MMOL/L (ref 3.5–5.1)
RBC # BLD: 2.17 M/CU MM (ref 4.2–5.4)
SODIUM BLD-SCNC: 137 MMOL/L (ref 135–145)
WBC # BLD: 2.4 K/CU MM (ref 4–10.5)

## 2019-03-12 PROCEDURE — 6360000002 HC RX W HCPCS: Performed by: INTERNAL MEDICINE

## 2019-03-12 PROCEDURE — 85027 COMPLETE CBC AUTOMATED: CPT

## 2019-03-12 PROCEDURE — 6360000002 HC RX W HCPCS: Performed by: HOSPITALIST

## 2019-03-12 PROCEDURE — 84100 ASSAY OF PHOSPHORUS: CPT

## 2019-03-12 PROCEDURE — 1200000000 HC SEMI PRIVATE

## 2019-03-12 PROCEDURE — 2580000003 HC RX 258: Performed by: HOSPITALIST

## 2019-03-12 PROCEDURE — 93010 ELECTROCARDIOGRAM REPORT: CPT | Performed by: INTERNAL MEDICINE

## 2019-03-12 PROCEDURE — 6370000000 HC RX 637 (ALT 250 FOR IP): Performed by: INTERNAL MEDICINE

## 2019-03-12 PROCEDURE — 2580000003 HC RX 258: Performed by: INTERNAL MEDICINE

## 2019-03-12 PROCEDURE — 94640 AIRWAY INHALATION TREATMENT: CPT

## 2019-03-12 PROCEDURE — 80048 BASIC METABOLIC PNL TOTAL CA: CPT

## 2019-03-12 PROCEDURE — 83735 ASSAY OF MAGNESIUM: CPT

## 2019-03-12 PROCEDURE — 94668 MNPJ CHEST WALL SBSQ: CPT

## 2019-03-12 PROCEDURE — 2700000000 HC OXYGEN THERAPY PER DAY

## 2019-03-12 PROCEDURE — 87081 CULTURE SCREEN ONLY: CPT

## 2019-03-12 PROCEDURE — 94761 N-INVAS EAR/PLS OXIMETRY MLT: CPT

## 2019-03-12 RX ORDER — METHYLPREDNISOLONE SODIUM SUCCINATE 40 MG/ML
40 INJECTION, POWDER, LYOPHILIZED, FOR SOLUTION INTRAMUSCULAR; INTRAVENOUS ONCE
Status: COMPLETED | OUTPATIENT
Start: 2019-03-12 | End: 2019-03-12

## 2019-03-12 RX ORDER — MAGNESIUM SULFATE IN WATER 40 MG/ML
2 INJECTION, SOLUTION INTRAVENOUS ONCE
Status: COMPLETED | OUTPATIENT
Start: 2019-03-12 | End: 2019-03-12

## 2019-03-12 RX ADMIN — ASPIRIN 325 MG ORAL TABLET 325 MG: 325 PILL ORAL at 09:35

## 2019-03-12 RX ADMIN — GUAIFENESIN 600 MG: 600 TABLET, EXTENDED RELEASE ORAL at 09:35

## 2019-03-12 RX ADMIN — METHYLPREDNISOLONE SODIUM SUCCINATE 40 MG: 40 INJECTION, POWDER, FOR SOLUTION INTRAMUSCULAR; INTRAVENOUS at 09:35

## 2019-03-12 RX ADMIN — SODIUM CHLORIDE, PRESERVATIVE FREE 10 ML: 5 INJECTION INTRAVENOUS at 20:18

## 2019-03-12 RX ADMIN — Medication 500 MG: at 20:17

## 2019-03-12 RX ADMIN — OSELTAMIVIR PHOSPHATE 75 MG: 75 CAPSULE ORAL at 20:16

## 2019-03-12 RX ADMIN — OSELTAMIVIR PHOSPHATE 75 MG: 75 CAPSULE ORAL at 09:35

## 2019-03-12 RX ADMIN — SODIUM CHLORIDE, PRESERVATIVE FREE 10 ML: 5 INJECTION INTRAVENOUS at 09:36

## 2019-03-12 RX ADMIN — GUAIFENESIN 600 MG: 600 TABLET, EXTENDED RELEASE ORAL at 20:16

## 2019-03-12 RX ADMIN — VANCOMYCIN HYDROCHLORIDE 1250 MG: 5 INJECTION, POWDER, LYOPHILIZED, FOR SOLUTION INTRAVENOUS at 01:55

## 2019-03-12 RX ADMIN — MULTIPLE VITAMINS W/ MINERALS TAB 1 TABLET: TAB at 11:27

## 2019-03-12 RX ADMIN — ENOXAPARIN SODIUM 40 MG: 40 INJECTION SUBCUTANEOUS at 09:35

## 2019-03-12 RX ADMIN — MAGNESIUM SULFATE HEPTAHYDRATE 2 G: 40 INJECTION, SOLUTION INTRAVENOUS at 14:36

## 2019-03-12 RX ADMIN — IPRATROPIUM BROMIDE AND ALBUTEROL SULFATE 1 AMPULE: .5; 3 SOLUTION RESPIRATORY (INHALATION) at 16:04

## 2019-03-12 RX ADMIN — GUAIFENESIN AND DEXTROMETHORPHAN 5 ML: 100; 10 SYRUP ORAL at 20:16

## 2019-03-12 RX ADMIN — LEVOTHYROXINE SODIUM 175 MCG: 100 TABLET ORAL at 06:03

## 2019-03-12 RX ADMIN — CEFEPIME 2 G: 2 INJECTION, POWDER, FOR SOLUTION INTRAVENOUS at 02:56

## 2019-03-12 RX ADMIN — Medication 1 CAPSULE: at 20:16

## 2019-03-12 RX ADMIN — TBO-FILGRASTIM 300 MCG: 300 INJECTION, SOLUTION SUBCUTANEOUS at 20:16

## 2019-03-12 RX ADMIN — IPRATROPIUM BROMIDE AND ALBUTEROL SULFATE 1 AMPULE: .5; 3 SOLUTION RESPIRATORY (INHALATION) at 08:22

## 2019-03-12 RX ADMIN — CEFEPIME 2 G: 2 INJECTION, POWDER, FOR SOLUTION INTRAVENOUS at 18:32

## 2019-03-12 RX ADMIN — VANCOMYCIN HYDROCHLORIDE 1250 MG: 5 INJECTION, POWDER, LYOPHILIZED, FOR SOLUTION INTRAVENOUS at 12:22

## 2019-03-12 RX ADMIN — Medication 1 CAPSULE: at 09:36

## 2019-03-12 RX ADMIN — Medication 2000 UNITS: at 09:35

## 2019-03-12 ASSESSMENT — PAIN SCALES - GENERAL
PAINLEVEL_OUTOF10: 0

## 2019-03-12 NOTE — PROGRESS NOTES
on vancomycin 1250 mg IVPB every 12 hours. · Plan to check a trough level: 03-13-19 @ 0030. · Pharmacy will continue to follow and make dose adjustments as appropriate.       Thank you for the consult,    Jl Sampson, PharmD, [facility]

## 2019-03-12 NOTE — CONSULTS
PRN  potassium chloride (KLOR-CON M) extended release tablet 40 mEq, 40 mEq, Oral, PRN **OR** potassium chloride (KLOR-CON) packet 40 mEq, 40 mEq, Oral, PRN **OR** potassium chloride 10 mEq/100 mL IVPB (Peripheral Line), 10 mEq, Intravenous, PRN  cefepime (MAXIPIME) 2 g in dextrose 5 % 50 mL IVPB, 2 g, Intravenous, Q12H  therapeutic multivitamin-minerals 1 tablet, 1 tablet, Oral, Daily  Tbo-Filgrastim (GRANIX) injection 300 mcg, 300 mcg, Subcutaneous, QPM  vancomycin (VANCOCIN) 1,250 mg in dextrose 5 % 250 mL IVPB, 1,250 mg, Intravenous, Q12H  benzonatate (TESSALON) capsule 100 mg, 100 mg, Oral, TID PRN    Allergies   Allergen Reactions    Amoxicillin-Pot Clavulanate Diarrhea    Prednisone & Diphenhydramine Other (See Comments)     All steroids. Patient can't walk, has nausea and diarrhea.     Sertraline Hcl Other (See Comments)    Codeine Nausea And Vomiting       Social History:    Social History     Socioeconomic History    Marital status:      Spouse name: None    Number of children: None    Years of education: None    Highest education level: None   Occupational History    None   Social Needs    Financial resource strain: None    Food insecurity:     Worry: None     Inability: None    Transportation needs:     Medical: None     Non-medical: None   Tobacco Use    Smoking status: Never Smoker    Smokeless tobacco: Never Used   Substance and Sexual Activity    Alcohol use: No    Drug use: No    Sexual activity: None   Lifestyle    Physical activity:     Days per week: None     Minutes per session: None    Stress: None   Relationships    Social connections:     Talks on phone: None     Gets together: None     Attends [employer]ian service: None     Active member of club or organization: None     Attends meetings of clubs or organizations: None     Relationship status: None    Intimate partner violence:     Fear of current or ex partner: None     Emotionally abused: None     Physically abused: None     Forced sexual activity: None   Other Topics Concern    None   Social History Narrative    None       Family History:   History reviewed. No pertinent family history. Immunization:    There is no immunization history on file for this patient. REVIEW OF SYSTEMS:    CONSTITUTIONAL:  negative for fevers, chills, diaphoresis, activity change, appetite change, fatigue, night sweats and unexpected weight change. EYES:  negative for blurred vision, eye discharge, visual disturbance and icterus  HEENT:  negative for hearing loss, tinnitus, ear drainage, sinus pressure, nasal congestion, epistaxis and snoring  RESPIRATORY:  See HPI  CARDIOVASCULAR:  negative for chest pain, palpitations, exertional chest pressure/discomfort, edema, syncope  GASTROINTESTINAL:  negative for nausea, vomiting, diarrhea, constipation, blood in stool and abdominal pain  GENITOURINARY:  negative for frequency, dysuria and hematuria  HEMATOLOGIC/LYMPHATIC:  negative for easy bruising, bleeding and lymphadenopathy  ALLERGIC/IMMUNOLOGIC:  negative for recurrent infections, angioedema, anaphylaxis and drug reactions  ENDOCRINE:  negative for weight changes and diabetic symptoms including polyuria, polydipsia and polyphagia    MUSCULOSKELETAL:  negative for  pain, joint swelling, decreased range of motion and muscle weakness  NEUROLOGICAL:  negative for headaches, slurred speech, unilateral weakness  PSYCHIATRIC/BEHAVIORAL: negative for hallucinations, behavioral problems, confusion and agitation.      Objective:   PHYSICAL EXAM:      VITALS:    Vitals:    03/11/19 1459 03/11/19 1530 03/11/19 2357 03/12/19 0400   BP: (!) 142/75  139/81 (!) 148/79   Pulse: 96  99 94   Resp: 18 17 19 18   Temp: 98.5 °F (36.9 °C)  98.2 °F (36.8 °C) 98 °F (36.7 °C)   TempSrc: Oral  Oral Oral   SpO2: 97% 98% 98% 96%   Weight:    171 lb 11.2 oz (77.9 kg)   Height:             CONSTITUTIONAL:  awake, alert, cooperative, no apparent distress, and appears stated age  NECK:  Supple, symmetrical, trachea midline, no adenopathy, thyroid symmetric, not enlarged and no tenderness  CHEST: Chest expansion equal and symmetrical, no intercostal retraction. LUNGS:  no increased work of breathing, has expiratory wheezes both lungs, no crackles. CARDIOVASCULAR: S1 and S2, no edema and no JVD  ABDOMEN:  normal bowel sounds, non-distended and no masses palpated, and no tenderness to palpation. No hepatospleenomegaly  LYMPHADENOPATHY:  no axillary or supraclavicular adenopathy. No cervical adnenopathy  PSYCHIATRIC: Oriented to person place and time. No obvious depression or anxiety. MUSCULOSKELETAL: No obvious misalignment or effusion of the joints. No clubbing, cyanosis of the digits. RIGHT AND LEFT LOWER EXTREMITIES: No edema, no inflammation, no tenderness. SKIN:  normal skin color, texture, turgor and no redness, warmth, or swelling. No palpable nodules    DATA:         EXAMINATION:   TWO VIEWS OF THE CHEST       3/10/2019 10:15 pm       COMPARISON:   02/25/2019       HISTORY:   ORDERING SYSTEM PROVIDED HISTORY: Chest pain   TECHNOLOGIST PROVIDED HISTORY:   Reason for exam:->Chest pain   Ordering Physician Provided Reason for Exam: chest pain   Acuity: Unknown   Type of Exam: Unknown       FINDINGS:   Left chest Port-A-Cath in place unchanged.  The cardiac silhouette is within   normal limits for size. The pulmonary vasculature is within normal limits. There is no focal consolidation, pleural effusion or pneumothorax. The   visualized osseous structures demonstrate no acute abnormality. Thoracolumbar scoliosis again noted.           Impression   No acute cardiopulmonary abnormality. Assessment:     1. Ac copd  2. Ac bronchitis  3. Pos inf A  4. [place] lung ca stage iii A  5. Brainstem mets XRT at [facility]  6. Pancytopenia sec to chemo          Plan:     1. D/w pt  2. Adequate o2 adm  3. Bd rx  4. Agree with antibx  5. Cont tamiflu  6.  D/w pt at length about need to use steroids. She says that she becomes weak and can not walk if she takes it for extended periods of time. After much discussion she is agreeable to one dose of solumedrol at 40 mg so will prescribe. 7. Pt to continue her fu with her pulmonologist in [place] upon discharge  8.  Thanks will follow

## 2019-03-12 NOTE — PROGRESS NOTES
Hospitalist Progress Note      Name:  Monica Lincoln /Age/Sex: 1955  (59 y.o. female)   MRN & CSN:  [id number] & [id number] Admission Date/Time: 3/10/2019  9:03 PM   Location:  [address] [place], [place] [address]- PCP: Lyubov Pathak MD         Hospital Day: 3    Assessment and Plan: Monica Lincoln is a 59 y.o.  female  who presents with Influenza A    1. Influenza A: Start Tamiflu x 5 days  2. Acute COPD Exacerbation: triggered by Influenza. CXR withnegative. Continue antibiotic, IV Steroid, Duoneb. Pulmonology on consult  3. Hyponatremia: could be from volume depletion. Na 137. IVF discontinued  4. Hypokalemia: replace accordingly. Magnesium 1.7  5. Pancytopenia: chemotherapy induced. Given Granix. WBC 2.4, Hb 7.1, Platelet 33.  6. Metastatic NSCLC  7. Hypothyroidism: last TSH. continue Synthroid. Diet DIET GENERAL;   DVT Prophylaxis [] Lovenox, []  Heparin, [] SCDs, [] Warfarin  [] NOAC     GI Prophylaxis [] PPI,  [] H2 Blocker,  [] Carafate,  [] Diet/Tube Feeds   Code Status Full Code   MDM [] Low, [] Moderate,[]  High     History of Present Illness:     Chief Complaint: Influenza A    She was seen and examined. She still complained of shortness of breath, worse with walking. She has slight cough. No fever. Ten point ROS reviewed negative, unless as noted above    Objective: Intake/Output Summary (Last 24 hours) at 3/12/2019 1041  Last data filed at 3/12/2019 0808  Gross per 24 hour   Intake 3506.77 ml   Output 2200 ml   Net 1306.77 ml      Vitals:   Vitals:    19 0824   BP:    Pulse:    Resp: 15   Temp:    SpO2: 98%     Physical Exam:   GEN Awake female, sitting upright in bed in no apparent distress. Appears given age. EYES Pupils are equally round. No scleral erythema, discharge, or conjunctivitis. HENT Mucous membranes are moist. Oral pharynx without exudates, no evidence of thrush. NECK Supple, no apparent thyromegaly or masses.   RESP + wheezing bilaterally, occasional 124* 133* 137   K 2.9  K CALLED TO JAYESH DO [id number] [facility]   RESULTS READ BACK  * 3.9 3.7   CL 89* 100 101   CO2 25 25 27   PHOS  --  2.5 2.3*   BUN 7 5* 3*   CREATININE 0.5* 0.5* 0.6     Recent Labs     03/10/19  2128   AST 26   ALT 20   BILITOT 0.1   ALKPHOS 73     No results for input(s): INR in the last 72 hours.   Recent Labs     03/10/19  2128   TROPONINT <0.010      Imaging reviewed    Electronically signed by Lynn Yoder MD on 3/12/2019 at 10:41 AM

## 2019-03-12 NOTE — CONSULTS
Inpatient consult to Oncology  Consult performed by: Tess Castro MD  Consult ordered by: Amanda Arce MD         [address] [facility] hematology and oncology associate [facility]    REASON FOR CONSULT  To evaluate the patient with metastatic non small cell lung cancer. CHIEF COMPLAINT    Chief Complaint   Patient presents with    Cough    Shortness of Breath       HISTORY OF PRESENT ILLNESS   Reid Lincoln is a 59 y.o. female with medical history significant for hypothyroidism, metastatic non small cell lung cancer, s/p palliative RT to pontine metastasis, s/p first line chemotherapy with carboplatin and etoposide recently, presented to [facility] on 3/11/19 with cough, SOB for four days duration. She was found to have influenza A and neutropenia. She was subsequently admitted to hospital for further evaluation. She was also found to have COPD exacerbation, hyponatremia and hypokalemia. I was called to evaluate her. She is getting better gradually. No new complaints this morning. PAST MEDICAL HISTORY    Past Medical History:   Diagnosis Date    Cancer [facility])     lung cancer (dx in 2017), gone to liver    Hypothyroidism        SURGICAL HISTORY    Past Surgical History:   Procedure Laterality Date    CHOLECYSTECTOMY      HYSTERECTOMY, TOTAL ABDOMINAL      LOBECTOMY  2002    right upper    PILONIDAL CYST EXCISION         FAMILY HISTORY    History reviewed. No pertinent family history.     SOCIAL HISTORY    Social History     Socioeconomic History    Marital status:      Spouse name: None    Number of children: None    Years of education: None    Highest education level: None   Occupational History    None   Social Needs    Financial resource strain: None    Food insecurity:     Worry: None     Inability: None    Transportation needs:     Medical: None     Non-medical: None   Tobacco Use    Smoking status: Never Smoker    Smokeless tobacco: Never Used Substance and Sexual Activity    Alcohol use: No    Drug use: No    Sexual activity: None   Lifestyle    Physical activity:     Days per week: None     Minutes per session: None    Stress: None   Relationships    Social connections:     Talks on phone: None     Gets together: None     Attends Jew service: None     Active member of club or organization: None     Attends meetings of clubs or organizations: None     Relationship status: None    Intimate partner violence:     Fear of current or ex partner: None     Emotionally abused: None     Physically abused: None     Forced sexual activity: None   Other Topics Concern    None   Social History Narrative    None       REVIEW OF SYSTEMS    Constitutional:  Denies fever, chills, loss of appetite, weight loss or weakness   HEENT:  Denies swelling of neck glands  Respiratory:  Has cough, shortness of breath. Denies hemoptysis  Cardiovascular:  Denies chest pain, palpitations or swelling   GI:  Denies abdominal pain, nausea, vomiting,, constipation or diarrhea   Musculoskeletal:  Denies back pain   Skin:  Denies rash   Neurologic:  Denies headache, focal weakness or sensory changes   All systems negative except as marked. PHYSICAL EXAM    Vitals: BP (!) 148/79   Pulse 94   Temp 98 °F (36.7 °C) (Oral)   Resp 15   Ht 5' 3\" (1.6 m)   Wt 171 lb 11.2 oz (77.9 kg)   SpO2 98%   BMI 30.42 kg/m²   General appearance: alert, appears stated age and cooperative  Skin: Skin color, texture, turgor normal. No rashes or lesions  HEENT: Head: Normocephalic, no lesions, without obvious abnormality. Neck: no adenopathy, no carotid bruit, no JVD, supple, symmetrical, trachea midline and thyroid not enlarged, symmetric, no tenderness/mass/nodules  Lungs: Has expiratory wheezes in both lung fields.    Heart: regular rate and rhythm, S1, S2 normal, no murmur, click, rub or gallop  Abdomen: soft, non-tender; bowel sounds normal; no masses,  no organomegaly  Extremities: extremities normal, atraumatic, no cyanosis or edema  Neurologic: Mental status: Alert, oriented, thought content appropriate  LABORATORY RESULTS  CBC:   Recent Labs     03/10/19  2128 03/11/19  0618   WBC 0.9* 0.7  WBC CALLED TO Mary Anderson RN ON 3/11/19 AT 0648 BY RITESH RHODES CLS  RESULTS READ BACK  *   HGB 7.2* 7.0*   PLT 54  RESULTS CONFIRMED BY SMEAR REVIEW  * 37*     BMP:    Recent Labs     03/10/19  2128 03/11/19  0618 03/12/19  0550   * 133* 137   K 2.9  K CALLED TO JAYESH MAXWELL [id number]   RESULTS READ BACK  * 3.9 3.7   CL 89* 100 101   CO2 25 25 27   BUN 7 5* 3*   CREATININE 0.5* 0.5* 0.6   GLUCOSE 109* 107* 106*     Hepatic:   Recent Labs     03/10/19  2128   AST 26   ALT 20   BILITOT 0.1   ALKPHOS 73     INR: No results for input(s): INR in the last 72 hours. RADIOLOGY REPORTS  Chest X ray  Impression   No acute cardiopulmonary abnormality. ASSESSMENT  1. Neutropenia - chemotherapy induced  2. Metastatic non small cell lung cancer  3. Influenza infection  4. COPD    RECOMMENDATION  She received granix since 3/11/19 and I will follow up with Ceferino Welch. Please continue with current antibiotics and management as per primary team.    We are planning to initiate maintenance chemo with immune check point inhibitor, Atezolizumab on 3/20/19. However, I may consider to hold it if she is not fully recovered at that time. We will follow the patient. Thank you for allowing me to participate in the care of your patient.

## 2019-03-13 LAB
ANION GAP SERPL CALCULATED.3IONS-SCNC: 9 MMOL/L (ref 4–16)
BUN BLDV-MCNC: 6 MG/DL (ref 6–23)
CALCIUM SERPL-MCNC: 8 MG/DL (ref 8.3–10.6)
CHLORIDE BLD-SCNC: 102 MMOL/L (ref 99–110)
CO2: 30 MMOL/L (ref 21–32)
CREAT SERPL-MCNC: 0.6 MG/DL (ref 0.6–1.1)
CULTURE: NORMAL
EKG ATRIAL RATE: 79 BPM
EKG DIAGNOSIS: NORMAL
EKG P AXIS: 47 DEGREES
EKG P-R INTERVAL: 162 MS
EKG Q-T INTERVAL: 388 MS
EKG QRS DURATION: 88 MS
EKG QTC CALCULATION (BAZETT): 444 MS
EKG R AXIS: 32 DEGREES
EKG T AXIS: 13 DEGREES
EKG VENTRICULAR RATE: 79 BPM
GFR AFRICAN AMERICAN: >60 ML/MIN/1.73M2
GFR NON-AFRICAN AMERICAN: >60 ML/MIN/1.73M2
GLUCOSE BLD-MCNC: 102 MG/DL (ref 70–99)
HCT VFR BLD CALC: 22.1 % (ref 37–47)
HEMOGLOBIN: 7.1 GM/DL (ref 12.5–16)
Lab: NORMAL
MCH RBC QN AUTO: 32.6 PG (ref 27–31)
MCHC RBC AUTO-ENTMCNC: 32.1 % (ref 32–36)
MCV RBC AUTO: 101.4 FL (ref 78–100)
PDW BLD-RTO: 15.3 % (ref 11.7–14.9)
PLATELET # BLD: 35 K/CU MM (ref 140–440)
PMV BLD AUTO: 11.2 FL (ref 7.5–11.1)
POTASSIUM SERPL-SCNC: 3.4 MMOL/L (ref 3.5–5.1)
RBC # BLD: 2.18 M/CU MM (ref 4.2–5.4)
SODIUM BLD-SCNC: 141 MMOL/L (ref 135–145)
SPECIMEN: NORMAL
WBC # BLD: 3.2 K/CU MM (ref 4–10.5)

## 2019-03-13 PROCEDURE — 6370000000 HC RX 637 (ALT 250 FOR IP): Performed by: INTERNAL MEDICINE

## 2019-03-13 PROCEDURE — 94668 MNPJ CHEST WALL SBSQ: CPT

## 2019-03-13 PROCEDURE — 80048 BASIC METABOLIC PNL TOTAL CA: CPT

## 2019-03-13 PROCEDURE — 1200000000 HC SEMI PRIVATE

## 2019-03-13 PROCEDURE — 94761 N-INVAS EAR/PLS OXIMETRY MLT: CPT

## 2019-03-13 PROCEDURE — 94640 AIRWAY INHALATION TREATMENT: CPT

## 2019-03-13 PROCEDURE — 6360000002 HC RX W HCPCS: Performed by: INTERNAL MEDICINE

## 2019-03-13 PROCEDURE — 2580000003 HC RX 258: Performed by: HOSPITALIST

## 2019-03-13 PROCEDURE — 2580000003 HC RX 258: Performed by: INTERNAL MEDICINE

## 2019-03-13 PROCEDURE — 85027 COMPLETE CBC AUTOMATED: CPT

## 2019-03-13 PROCEDURE — 6360000002 HC RX W HCPCS: Performed by: HOSPITALIST

## 2019-03-13 RX ORDER — METHYLPREDNISOLONE SODIUM SUCCINATE 40 MG/ML
40 INJECTION, POWDER, LYOPHILIZED, FOR SOLUTION INTRAMUSCULAR; INTRAVENOUS ONCE
Status: COMPLETED | OUTPATIENT
Start: 2019-03-13 | End: 2019-03-13

## 2019-03-13 RX ORDER — POTASSIUM CHLORIDE 20 MEQ/1
40 TABLET, EXTENDED RELEASE ORAL ONCE
Status: COMPLETED | OUTPATIENT
Start: 2019-03-13 | End: 2019-03-13

## 2019-03-13 RX ADMIN — Medication 1 CAPSULE: at 09:58

## 2019-03-13 RX ADMIN — GUAIFENESIN AND DEXTROMETHORPHAN 5 ML: 100; 10 SYRUP ORAL at 16:06

## 2019-03-13 RX ADMIN — SODIUM CHLORIDE, PRESERVATIVE FREE 10 ML: 5 INJECTION INTRAVENOUS at 10:04

## 2019-03-13 RX ADMIN — SODIUM CHLORIDE, PRESERVATIVE FREE 10 ML: 5 INJECTION INTRAVENOUS at 22:35

## 2019-03-13 RX ADMIN — CEFEPIME 2 G: 2 INJECTION, POWDER, FOR SOLUTION INTRAVENOUS at 03:35

## 2019-03-13 RX ADMIN — OSELTAMIVIR PHOSPHATE 75 MG: 75 CAPSULE ORAL at 22:34

## 2019-03-13 RX ADMIN — IPRATROPIUM BROMIDE AND ALBUTEROL SULFATE 1 AMPULE: .5; 3 SOLUTION RESPIRATORY (INHALATION) at 11:26

## 2019-03-13 RX ADMIN — TBO-FILGRASTIM 300 MCG: 300 INJECTION, SOLUTION SUBCUTANEOUS at 23:29

## 2019-03-13 RX ADMIN — IPRATROPIUM BROMIDE AND ALBUTEROL SULFATE 1 AMPULE: .5; 3 SOLUTION RESPIRATORY (INHALATION) at 15:22

## 2019-03-13 RX ADMIN — OSELTAMIVIR PHOSPHATE 75 MG: 75 CAPSULE ORAL at 09:58

## 2019-03-13 RX ADMIN — LEVOTHYROXINE SODIUM 175 MCG: 100 TABLET ORAL at 05:57

## 2019-03-13 RX ADMIN — MULTIPLE VITAMINS W/ MINERALS TAB 1 TABLET: TAB at 12:49

## 2019-03-13 RX ADMIN — Medication 500 MG: at 22:34

## 2019-03-13 RX ADMIN — CEFEPIME 2 G: 2 INJECTION, POWDER, FOR SOLUTION INTRAVENOUS at 15:57

## 2019-03-13 RX ADMIN — METHYLPREDNISOLONE SODIUM SUCCINATE 40 MG: 40 INJECTION, POWDER, FOR SOLUTION INTRAMUSCULAR; INTRAVENOUS at 12:49

## 2019-03-13 RX ADMIN — GUAIFENESIN 600 MG: 600 TABLET, EXTENDED RELEASE ORAL at 09:59

## 2019-03-13 RX ADMIN — Medication 2000 UNITS: at 09:58

## 2019-03-13 RX ADMIN — GUAIFENESIN 600 MG: 600 TABLET, EXTENDED RELEASE ORAL at 22:34

## 2019-03-13 RX ADMIN — POTASSIUM CHLORIDE 40 MEQ: 20 TABLET, EXTENDED RELEASE ORAL at 12:49

## 2019-03-13 RX ADMIN — IPRATROPIUM BROMIDE AND ALBUTEROL SULFATE 1 AMPULE: .5; 3 SOLUTION RESPIRATORY (INHALATION) at 07:59

## 2019-03-13 RX ADMIN — VANCOMYCIN HYDROCHLORIDE 1250 MG: 5 INJECTION, POWDER, LYOPHILIZED, FOR SOLUTION INTRAVENOUS at 01:21

## 2019-03-13 RX ADMIN — ASPIRIN 325 MG ORAL TABLET 325 MG: 325 PILL ORAL at 09:58

## 2019-03-13 RX ADMIN — IPRATROPIUM BROMIDE AND ALBUTEROL SULFATE 1 AMPULE: .5; 3 SOLUTION RESPIRATORY (INHALATION) at 20:48

## 2019-03-13 RX ADMIN — Medication 1 CAPSULE: at 22:34

## 2019-03-13 ASSESSMENT — PAIN SCALES - GENERAL
PAINLEVEL_OUTOF10: 0
PAINLEVEL_OUTOF10: 0

## 2019-03-13 NOTE — PROGRESS NOTES
pulmonary      SUBJECTIVE: she feels better     OBJECTIVE    VITALS:  /60   Pulse 96   Temp 98.4 °F (36.9 °C) (Oral)   Resp 15   Ht 5' 3\" (1.6 m)   Wt 171 lb 11.2 oz (77.9 kg)   SpO2 93%   BMI 30.42 kg/m²   HEAD AND FACE EXAM:  No throat injection, no active exudate,no thrush  NECK EXAM;No JVD, no masses, symmetrical  CHEST EXAM; Expansion equal and symmetrical, no masses  LUNG EXAM; Good breath sounds bilaterally. There are expiratory wheezes both lungs, there are crackles at both lung bases  CARDIOVASCULAR EXAM: Positive S1 and S2, no S3 or S4, no clicks ,no murmurs  RIGHT AND LEFT LOWER EXTRIMITY EXAM: No edema, no swelling, no inflamation  CNS EXAM: Alert and oriented X3          LABS   Lab Results   Component Value Date    WBC 3.2 (L) 03/13/2019    HGB 7.1 (L) 03/13/2019    HCT 22.1 (L) 03/13/2019    .4 (H) 03/13/2019    PLT 35 (L) 03/13/2019     Lab Results   Component Value Date    CREATININE 0.6 03/13/2019    BUN 6 03/13/2019     03/13/2019    K 3.4 (L) 03/13/2019     03/13/2019    CO2 30 03/13/2019     No results found for: INR, PROTIME       Lab Results   Component Value Date    PHOS 2.3 03/12/2019    PHOS 2.5 03/11/2019      No results for input(s): PH, PO2ART, PTL2WJC, HCO3, BEART, O2SAT in the last 72 hours. Wt Readings from Last 3 Encounters:   03/12/19 171 lb 11.2 oz (77.9 kg)   02/26/19 172 lb 6.4 oz (78.2 kg)   12/10/18 170 lb (77.1 kg)               ASSESMENT  Ac copd  Ac bronchirtis        PLAN  1. cpm  2.  One more dose of solumedrol as pt is agreeable and then reevaluate tomorrow    3/13/2019  June Enriquez MD, M.D.

## 2019-03-13 NOTE — PROGRESS NOTES
- Hyponatremia: 124 on admission, from volume depletion: improved with NS  - Hypokalemia: replete  - NSCLC with brainstem mets: chemo and radiation  - Hypothyroidism  - Neutropenia s/p chemo       Plan:  - serum sodium wnl  - I have liberalized her normal intake, no longer on fluid restrtiction  - give 40meq po kcl once today  - ok to dc per my standpoint    Thank you

## 2019-03-13 NOTE — PROGRESS NOTES
HEMATOLOGY & ONCOLOGY progress note  [facility]      Patient was seen and examined today. Not in any acute distress and no overnight evnets. She is getting better today. No new complaints. PHYSICAL EXAM    Vitals: /70   Pulse 94   Temp 98.7 °F (37.1 °C) (Oral)   Resp 24   Ht 5' 3\" (1.6 m)   Wt 171 lb 11.2 oz (77.9 kg)   SpO2 97%   BMI 30.42 kg/m²   General appearance: alert, appears stated age and cooperative  Skin: Skin color, texture, turgor normal. No rashes or lesions  HEENT: Head: Normocephalic, no lesions, without obvious abnormality. Neck: no adenopathy, no carotid bruit, no JVD, supple, symmetrical, trachea midline and thyroid not enlarged, symmetric, no tenderness/mass/nodules  Lungs: Has expiratory wheezes in both lung fields. Heart: regular rate and rhythm, S1, S2 normal, no murmur, click, rub or gallop  Abdomen: soft, non-tender; bowel sounds normal; no masses,  no organomegaly  Extremities: extremities normal, atraumatic, no cyanosis or edema  Neurologic: Mental status: Alert, oriented, thought content appropriate    LABORATORY RESULTS  CBC:   Recent Labs     03/11/19  0618 03/12/19  0810 03/13/19  0515   WBC 0.7  WBC CALLED TO Debora Rao RN ON 3/11/19 AT 0648 BY RITESH RHODES [facility]  RESULTS READ BACK  * 2.4* 3.2*   HGB 7.0* 7.1* 7.1*   PLT 37* 33* 35*     BMP:    Recent Labs     03/11/19  0618 03/12/19  0550 03/13/19  0515   * 137 141   K 3.9 3.7 3.4*    101 102   CO2 25 27 30   BUN 5* 3* 6   CREATININE 0.5* 0.6 0.6   GLUCOSE 107* 106* 102*     Hepatic:   Recent Labs     03/10/19  2128   AST 26   ALT 20   BILITOT 0.1   ALKPHOS 73     INR: No results for input(s): INR in the last 72 hours. ASSESSMENT  1. Neutropenia - chemotherapy induced  2. Metastatic non small cell lung cancer  3. Influenza infection  4. COPD     RECOMMENDATION  She received granix since 3/11/19 and I will give day 3 granix today.  Please continue with current antibiotics and management as per primary team.     She is feeling much better now. Hopefully she can be discharged home tomorrow. We are planning to initiate maintenance chemo with immune check point inhibitor, Atezolizumab on 3/20/19.    We will follow the patient.  Thank you

## 2019-03-13 NOTE — PROGRESS NOTES
Hospitalist Progress Note      Name:  Kevyn Lincoln /Age/Sex: 1955  (59 y.o. female)   MRN & CSN:  [id number] & [id number] Admission Date/Time: 3/10/2019  9:03 PM   Location:  [address] [place] [address] PCP: Yulisa Palm MD         Hospital Day: 4    Assessment and Plan: Kevyn Lincoln is a 59 y.o.  female  who presents with Influenza A    1. Influenza A: Start Tamiflu x 5 days  2. Acute COPD Exacerbation: triggered by Influenza. CXR negative. Continue antibiotic Cefepime, D/C vancomycin. IV Steroid x 1, Duoneb. Pulmonology on consult  3. Hyponatremia: could be from volume depletion. Na 137. IVF discontinued  4. Hypokalemia: replace accordingly. Magnesium 1.7  5. Pancytopenia: chemotherapy induced. Given Granix. WBC 3.2, Hb 7.1, Platelet 35.  6. Metastatic NSCLC  7. Hypothyroidism: last TSH. continue Synthroid. Diet DIET GENERAL;   DVT Prophylaxis [] Lovenox, []  Heparin, [x] SCDs, [] Warfarin  [] NOAC     GI Prophylaxis [] PPI,  [] H2 Blocker,  [] Carafate,  [x] Diet/Tube Feeds   Code Status Full Code   MDM [] Low, [x] Moderate,[]  High     History of Present Illness:     Chief Complaint: Influenza A    She was seen and examined. SOB better, cough improved. No fever. Off O2 but with saturation of 92%. Ten point ROS reviewed negative, unless as noted above    Objective: Intake/Output Summary (Last 24 hours) at 3/13/2019 1000  Last data filed at 3/12/2019 1832  Gross per 24 hour   Intake 350 ml   Output --   Net 350 ml      Vitals:   Vitals:    19 0802   BP:    Pulse:    Resp:    Temp:    SpO2: 93%     Physical Exam:   GEN Awake female, sitting upright in bed in no apparent distress. Appears given age. Pale looking  EYES Pupils are equally round. No scleral erythema, discharge, or conjunctivitis. HENT Mucous membranes are moist. Oral pharynx without exudates, no evidence of thrush. NECK Supple, no apparent thyromegaly or masses.   RESP + wheezing bilaterally, occasional crackles, the

## 2019-03-14 VITALS
OXYGEN SATURATION: 96 % | HEIGHT: 63 IN | DIASTOLIC BLOOD PRESSURE: 60 MMHG | SYSTOLIC BLOOD PRESSURE: 119 MMHG | TEMPERATURE: 98 F | BODY MASS INDEX: 30.42 KG/M2 | RESPIRATION RATE: 21 BRPM | WEIGHT: 171.7 LBS | HEART RATE: 87 BPM

## 2019-03-14 LAB
ANION GAP SERPL CALCULATED.3IONS-SCNC: 7 MMOL/L (ref 4–16)
BUN BLDV-MCNC: 8 MG/DL (ref 6–23)
CALCIUM SERPL-MCNC: 8.7 MG/DL (ref 8.3–10.6)
CHLORIDE BLD-SCNC: 102 MMOL/L (ref 99–110)
CO2: 31 MMOL/L (ref 21–32)
CREAT SERPL-MCNC: 0.6 MG/DL (ref 0.6–1.1)
GFR AFRICAN AMERICAN: >60 ML/MIN/1.73M2
GFR NON-AFRICAN AMERICAN: >60 ML/MIN/1.73M2
GLUCOSE BLD-MCNC: 101 MG/DL (ref 70–99)
HCT VFR BLD CALC: 22.3 % (ref 37–47)
HEMOGLOBIN: 7.1 GM/DL (ref 12.5–16)
MCH RBC QN AUTO: 32.6 PG (ref 27–31)
MCHC RBC AUTO-ENTMCNC: 31.8 % (ref 32–36)
MCV RBC AUTO: 102.3 FL (ref 78–100)
PDW BLD-RTO: 15.6 % (ref 11.7–14.9)
PLATELET # BLD: 49 K/CU MM (ref 140–440)
PMV BLD AUTO: 13 FL (ref 7.5–11.1)
POTASSIUM SERPL-SCNC: 3.9 MMOL/L (ref 3.5–5.1)
RBC # BLD: 2.18 M/CU MM (ref 4.2–5.4)
SODIUM BLD-SCNC: 140 MMOL/L (ref 135–145)
WBC # BLD: 5.9 K/CU MM (ref 4–10.5)

## 2019-03-14 PROCEDURE — 6370000000 HC RX 637 (ALT 250 FOR IP): Performed by: INTERNAL MEDICINE

## 2019-03-14 PROCEDURE — 94761 N-INVAS EAR/PLS OXIMETRY MLT: CPT

## 2019-03-14 PROCEDURE — 94668 MNPJ CHEST WALL SBSQ: CPT

## 2019-03-14 PROCEDURE — 2580000003 HC RX 258: Performed by: INTERNAL MEDICINE

## 2019-03-14 PROCEDURE — 6360000002 HC RX W HCPCS: Performed by: INTERNAL MEDICINE

## 2019-03-14 PROCEDURE — 85027 COMPLETE CBC AUTOMATED: CPT

## 2019-03-14 PROCEDURE — 80048 BASIC METABOLIC PNL TOTAL CA: CPT

## 2019-03-14 PROCEDURE — 94640 AIRWAY INHALATION TREATMENT: CPT

## 2019-03-14 RX ORDER — METHYLPREDNISOLONE 32 MG/1
32 TABLET ORAL DAILY
Qty: 2 TABLET | Refills: 0 | Status: SHIPPED | OUTPATIENT
Start: 2019-03-14 | End: 2019-03-16

## 2019-03-14 RX ORDER — OSELTAMIVIR PHOSPHATE 75 MG/1
75 CAPSULE ORAL 2 TIMES DAILY
Qty: 2 CAPSULE | Refills: 0 | Status: SHIPPED | OUTPATIENT
Start: 2019-03-14 | End: 2019-03-15

## 2019-03-14 RX ORDER — GUAIFENESIN 600 MG/1
600 TABLET, EXTENDED RELEASE ORAL 2 TIMES DAILY
Qty: 20 TABLET | Refills: 0 | Status: SHIPPED | OUTPATIENT
Start: 2019-03-14

## 2019-03-14 RX ORDER — BENZONATATE 100 MG/1
100 CAPSULE ORAL 3 TIMES DAILY PRN
Qty: 20 CAPSULE | Refills: 0 | Status: SHIPPED | OUTPATIENT
Start: 2019-03-14 | End: 2019-03-21

## 2019-03-14 RX ORDER — PREDNISONE 20 MG/1
40 TABLET ORAL DAILY
Qty: 20 TABLET | Refills: 0 | Status: SHIPPED | OUTPATIENT
Start: 2019-03-14 | End: 2019-03-14 | Stop reason: HOSPADM

## 2019-03-14 RX ORDER — GUAIFENESIN/DEXTROMETHORPHAN 100-10MG/5
5 SYRUP ORAL EVERY 4 HOURS PRN
Qty: 120 ML | Refills: 0 | Status: SHIPPED | OUTPATIENT
Start: 2019-03-14 | End: 2019-03-24

## 2019-03-14 RX ORDER — DOXYCYCLINE HYCLATE 100 MG
100 TABLET ORAL 2 TIMES DAILY
Qty: 14 TABLET | Refills: 0 | Status: SHIPPED | OUTPATIENT
Start: 2019-03-14 | End: 2019-03-19

## 2019-03-14 RX ORDER — IPRATROPIUM BROMIDE AND ALBUTEROL SULFATE 2.5; .5 MG/3ML; MG/3ML
3 SOLUTION RESPIRATORY (INHALATION)
Qty: 360 ML | Refills: 0 | Status: SHIPPED | OUTPATIENT
Start: 2019-03-14

## 2019-03-14 RX ADMIN — IPRATROPIUM BROMIDE AND ALBUTEROL SULFATE 1 AMPULE: .5; 3 SOLUTION RESPIRATORY (INHALATION) at 08:28

## 2019-03-14 RX ADMIN — LEVOTHYROXINE SODIUM 175 MCG: 100 TABLET ORAL at 05:46

## 2019-03-14 RX ADMIN — Medication 2000 UNITS: at 08:35

## 2019-03-14 RX ADMIN — Medication 1 CAPSULE: at 08:35

## 2019-03-14 RX ADMIN — CEFEPIME 2 G: 2 INJECTION, POWDER, FOR SOLUTION INTRAVENOUS at 02:47

## 2019-03-14 RX ADMIN — ASPIRIN 325 MG ORAL TABLET 325 MG: 325 PILL ORAL at 08:35

## 2019-03-14 RX ADMIN — SODIUM CHLORIDE, PRESERVATIVE FREE 10 ML: 5 INJECTION INTRAVENOUS at 08:35

## 2019-03-14 RX ADMIN — OSELTAMIVIR PHOSPHATE 75 MG: 75 CAPSULE ORAL at 08:35

## 2019-03-14 RX ADMIN — GUAIFENESIN 600 MG: 600 TABLET, EXTENDED RELEASE ORAL at 08:35

## 2019-03-14 ASSESSMENT — PAIN SCALES - GENERAL: PAINLEVEL_OUTOF10: 0

## 2019-03-14 NOTE — PROGRESS NOTES
HEMATOLOGY & ONCOLOGY progress note  [facility]      Patient was seen and examined today. Not in any acute distress and no overnight evnets. She is getting better today. WBC is getting back to normal, and platelet is recovering. No new complaints. PHYSICAL EXAM    Vitals: /60   Pulse 87   Temp 98 °F (36.7 °C) (Oral)   Resp 21   Ht 5' 3\" (1.6 m)   Wt 171 lb 11.2 oz (77.9 kg)   SpO2 96%   BMI 30.42 kg/m²   General appearance: alert, appears stated age and cooperative  Skin: Skin color, texture, turgor normal. No rashes or lesions  HEENT: Head: Normocephalic, no lesions, without obvious abnormality. Neck: no adenopathy, no carotid bruit, no JVD, supple, symmetrical, trachea midline and thyroid not enlarged, symmetric, no tenderness/mass/nodules  Lungs: Has expiratory wheezes in both lung fields. Heart: regular rate and rhythm, S1, S2 normal, no murmur, click, rub or gallop  Abdomen: soft, non-tender; bowel sounds normal; no masses,  no organomegaly  Extremities: extremities normal, atraumatic, no cyanosis or edema  Neurologic: Mental status: Alert, oriented, thought content appropriate    LABORATORY RESULTS  CBC:   Recent Labs     03/12/19  0810 03/13/19  0515 03/14/19  0530   WBC 2.4* 3.2* 5.9   HGB 7.1* 7.1* 7.1*   PLT 33* 35* 49*     BMP:    Recent Labs     03/12/19  0550 03/13/19  0515 03/14/19  0530    141 140   K 3.7 3.4* 3.9    102 102   CO2 27 30 31   BUN 3* 6 8   CREATININE 0.6 0.6 0.6   GLUCOSE 106* 102* 101*     Hepatic:   No results for input(s): AST, ALT, ALB, BILITOT, ALKPHOS in the last 72 hours. INR: No results for input(s): INR in the last 72 hours. ASSESSMENT  1. Neutropenia - chemotherapy induced  2. Metastatic non small cell lung cancer  3. Influenza infection  4.  COPD     RECOMMENDATION  She received 3 doses of granix and her WBC is back to normal. Platelet count is recovering as well.      She is feeling much better now and she can be released home from oncology stand point. Dr. Arcenio Dunlap order prednisone, but she couldn't handle it well. I will change it to methylprednisone. We are planning to initiate maintenance chemo with immune check point inhibitor, Atezolizumab on 3/20/19.    We will follow the patient.  Thank you

## 2019-03-14 NOTE — DISCHARGE SUMMARY
Discharge Summary    Name:  Albertina Lincoln /Age/Sex: 1955  (59 y.o. female)   MRN & CSN:  [id number] & [id number] Admission Date/Time: 3/10/2019  9:03 PM   Attending:  Oliver Milton MD Discharging Physician: Oliver Milton MD     Hospital Course: Albertina Lincoln is a 59 y.o.  female  who presents with Influenza A     She was admitted because of 4 day history of shortness of breath. She was found to have Influenza and was started on Tamiflu. She was also started on antibiotic for possible COPD Exacerbation. Oncology and Pulmonology were consulted. She was given Granix. She was also found to have hyponatremia and hypokalemia. Nephrology was consult. She was started on IVF. Her symptoms improved. She was discharged stable. 1. Influenza A: Continue Tamiflu x 1 more day  2. Acute COPD Exacerbation: triggered by Influenza. CXR negative. given antibiotic Cefepime, D/C vancomycin. IV Steroid x 2, Duoneb. Pulmonology on consult. Will be discharged on Doxycycline and Prednisone  3. Hyponatremia: could be from volume depletion. Na 137. IVF discontinued  4. Hypokalemia: replace accordingly. Magnesium 1.7  5. Pancytopenia: chemotherapy induced. Given Granix x 1. WBC 5.9, Hb 7.1, Platelet 49.  6. Metastatic NSCLC  7. Hypothyroidism: last TSH. continue Synthroid. The patient expressed appropriate understanding of and agreement with the discharge recommendations, medications, and plan.      Consults this admission:  IP CONSULT TO HOSPITALIST  IP CONSULT TO ONCOLOGY  IP CONSULT TO NEPHROLOGY  IP CONSULT TO PHARMACY  IP CONSULT TO PULMONOLOGY    Discharge Instruction:   Follow up appointments: Pulmonology, Oncology  Primary care physician:  within 1 weeks    Diet:  regular diet   Activity: activity as tolerated  Disposition: Discharged to:   [x]Home, []HHC, []SNF, []Acute Rehab, []Hospice   Condition on discharge: Stable    Discharge Medications:      Cynda Kells   Home Medication Instructions YOKASTAYL:[id number]    Printed on:03/14/19 0592   Medication Information                      Acidophilus Lactobacillus CAPS  Take by mouth             aspirin 325 MG tablet  Take by mouth             B Complex-Biotin-FA (B-COMPLEX PO)  Take 1 each by mouth nightly             benzonatate (TESSALON) 100 MG capsule  Take 1 capsule by mouth 3 times daily as needed for Cough             Cholecalciferol 1000 units CHEW  Take 2,000 Units by mouth             Cinnamon 500 MG TABS  Take 1 tablet by mouth nightly             coenzyme Q10 60 MG CAPS  Take 60 mg by mouth nightly              doxycycline hyclate (VIBRA-TABS) 100 MG tablet  Take 1 tablet by mouth 2 times daily for 5 days             Garlic 1426 MG CAPS  Take 1 capsule by mouth nightly             guaiFENesin (MUCINEX) 600 MG extended release tablet  Take 1 tablet by mouth 2 times daily             guaiFENesin-dextromethorphan (ROBITUSSIN DM) 100-10 MG/5ML syrup  Take 5 mLs by mouth every 4 hours as needed for Cough             ipratropium-albuterol (DUONEB) 0.5-2.5 (3) MG/3ML SOLN nebulizer solution  Inhale 3 mLs into the lungs every 4 hours (while awake)             levothyroxine (SYNTHROID) 175 MCG tablet  Take 175 mcg by mouth              Multiple Vitamins-Minerals (HAIR/SKIN/NAILS) TABS  Take 1 tablet by mouth daily              NIACIN CR PO  Take 1 tablet by mouth nightly              ondansetron (ZOFRAN-ODT) 8 MG TBDP disintegrating tablet  Take 8 mg by mouth as needed for Nausea              oseltamivir (TAMIFLU) 75 MG capsule  Take 1 capsule by mouth 2 times daily for 1 day             predniSONE (DELTASONE) 20 MG tablet  Take 2 tablets by mouth daily for 2 days             prochlorperazine (COMPAZINE) 10 MG tablet  Take 10 mg by mouth as needed              RA KRILL OIL CAPS  Take 1 capsule by mouth nightly              TURMERIC PO  Take 1 each by mouth nightly                 Objective Findings at Discharge:   /67   Pulse 90   Temp 99 °F (37.2 °C) (Oral)   Resp 16   Ht 5' 3\" (1.6 m)   Wt 171 lb 11.2 oz (77.9 kg)   SpO2 93%   BMI 30.42 kg/m²            GEN    Awake female, sitting upright in bed in no apparent distress. Appears given age. Pale looking  EYES   Pupils are equally round. No scleral erythema, discharge, or conjunctivitis. HENT  Mucous membranes are moist. Oral pharynx without exudates, no evidence of thrush. NECK  Supple, no apparent thyromegaly or masses. RESP  + wheezing bilaterally, occasional crackles, the same  CARDIO/VASC           S1/S2 auscultated. Regular rate without appreciable murmurs, rubs, or gallops. No JVD or carotid bruits. Peripheral pulses equal bilaterally and palpable. No peripheral edema. GI        Abdomen is soft without significant tenderness, masses, or guarding. Bowel sounds are normoactive. Rectal exam deferred. MSK    No gross joint deformities. SKIN    Normal coloration, warm, dry. NEURO           Cranial nerves appear grossly intact, normal speech, no lateralizing weakness. PSYCH            Awake, alert, oriented x 4. Affect appropriate. BMP/CBC  Recent Labs     03/12/19  0550 03/12/19  0810 03/13/19  0515 03/14/19  0530     --  141 140   K 3.7  --  3.4* 3.9     --  102 102   CO2 27  --  30 31   BUN 3*  --  6 8   CREATININE 0.6  --  0.6 0.6   WBC  --  2.4* 3.2* 5.9   HCT  --  22.1* 22.1* 22.3*   PLT  --  33* 35* 49*     IMAGING:  Xr Chest Standard (2 Vw)    Result Date: 3/10/2019  EXAMINATION: TWO VIEWS OF THE CHEST 3/10/2019 10:15 pm COMPARISON: 02/25/2019 HISTORY: ORDERING SYSTEM PROVIDED HISTORY: Chest pain TECHNOLOGIST PROVIDED HISTORY: Reason for exam:->Chest pain Ordering Physician Provided Reason for Exam: chest pain Acuity: Unknown Type of Exam: Unknown FINDINGS: Left chest Port-A-Cath in place unchanged. The cardiac silhouette is within normal limits for size. The pulmonary vasculature is within normal limits.  There is no focal consolidation, pleural effusion or pneumothorax. The visualized osseous structures demonstrate no acute abnormality. Thoracolumbar scoliosis again noted. No acute cardiopulmonary abnormality.      Discharge Time of 37 minutes    Electronically signed by Jake Dahl MD on 3/14/2019 at 9:29 AM

## 2019-03-14 NOTE — PROGRESS NOTES
CLINICAL PHARMACY NOTE: MEDS TO [address] [place] [address] Drive Select Patient?: No  Total # of Prescriptions Filled: 5   The following medications were delivered to the patient:  · Doxycycline 100mg 10  · methylpredniso 16 mg #4  · oseltamivir  75 mg  2  · Duo Nebs 360 ml  Benzonatate 100mg BID 20  Total # of Interventions Completed: 5  Time Spent (min): 45    Additional Documentation:

## 2019-03-16 LAB
CULTURE: NORMAL
CULTURE: NORMAL
Lab: NORMAL
Lab: NORMAL
SPECIMEN: NORMAL
SPECIMEN: NORMAL

## 2019-03-19 ENCOUNTER — HOSPITAL ENCOUNTER (OUTPATIENT)
Age: 64
Setting detail: SPECIMEN
Discharge: HOME | End: 2019-03-19
Payer: COMMERCIAL

## 2019-03-19 LAB
ALBUMIN SERPL-MCNC: 3.9 GM/DL (ref 3.4–5)
ALP BLD-CCNC: 98 IU/L (ref 40–129)
ALT SERPL-CCNC: 12 U/L (ref 10–40)
ANION GAP SERPL CALCULATED.3IONS-SCNC: 12 MMOL/L (ref 4–16)
AST SERPL-CCNC: 15 IU/L (ref 15–37)
BILIRUB SERPL-MCNC: 0.2 MG/DL (ref 0–1)
BUN BLDV-MCNC: 11 MG/DL (ref 6–23)
CALCIUM SERPL-MCNC: 8.7 MG/DL (ref 8.3–10.6)
CHLORIDE BLD-SCNC: 98 MMOL/L (ref 99–110)
CO2: 26 MMOL/L (ref 21–32)
CREAT SERPL-MCNC: 0.6 MG/DL (ref 0.6–1.1)
GFR AFRICAN AMERICAN: >60 ML/MIN/1.73M2
GFR NON-AFRICAN AMERICAN: >60 ML/MIN/1.73M2
GLUCOSE BLD-MCNC: 98 MG/DL (ref 70–99)
POTASSIUM SERPL-SCNC: 3.9 MMOL/L (ref 3.5–5.1)
SODIUM BLD-SCNC: 136 MMOL/L (ref 135–145)
TOTAL PROTEIN: 6.5 GM/DL (ref 6.4–8.2)

## 2019-03-19 PROCEDURE — 80053 COMPREHEN METABOLIC PANEL: CPT

## 2019-03-27 PROBLEM — J44.9 CHRONIC OBSTRUCTIVE PULMONARY DISEASE (HCC): Status: ACTIVE | Noted: 2019-03-27

## 2019-04-10 ENCOUNTER — HOSPITAL ENCOUNTER (OUTPATIENT)
Age: 64
Setting detail: SPECIMEN
Discharge: HOME | End: 2019-04-10
Payer: COMMERCIAL

## 2019-04-10 LAB
ALBUMIN SERPL-MCNC: 3.7 GM/DL (ref 3.4–5)
ALP BLD-CCNC: 88 IU/L (ref 40–128)
ALT SERPL-CCNC: 27 U/L (ref 10–40)
ANION GAP SERPL CALCULATED.3IONS-SCNC: 11 MMOL/L (ref 4–16)
AST SERPL-CCNC: 33 IU/L (ref 15–37)
BILIRUB SERPL-MCNC: 0.2 MG/DL (ref 0–1)
BUN BLDV-MCNC: 6 MG/DL (ref 6–23)
CALCIUM SERPL-MCNC: 9.1 MG/DL (ref 8.3–10.6)
CHLORIDE BLD-SCNC: 100 MMOL/L (ref 99–110)
CO2: 25 MMOL/L (ref 21–32)
CREAT SERPL-MCNC: 0.6 MG/DL (ref 0.6–1.1)
GFR AFRICAN AMERICAN: >60 ML/MIN/1.73M2
GFR NON-AFRICAN AMERICAN: >60 ML/MIN/1.73M2
GLUCOSE BLD-MCNC: 87 MG/DL (ref 70–99)
POTASSIUM SERPL-SCNC: 4.1 MMOL/L (ref 3.5–5.1)
SODIUM BLD-SCNC: 136 MMOL/L (ref 135–145)
TOTAL PROTEIN: 6.5 GM/DL (ref 6.4–8.2)

## 2019-04-10 PROCEDURE — 36415 COLL VENOUS BLD VENIPUNCTURE: CPT

## 2019-04-10 PROCEDURE — 80053 COMPREHEN METABOLIC PANEL: CPT

## 2019-04-11 PROBLEM — J10.1 INFLUENZA A: Status: RESOLVED | Noted: 2019-03-10 | Resolved: 2019-04-11

## 2019-04-29 ENCOUNTER — HOSPITAL ENCOUNTER (OUTPATIENT)
Age: 64
Setting detail: SPECIMEN
Discharge: HOME | End: 2019-04-29
Payer: COMMERCIAL

## 2019-04-29 LAB
T4 FREE: 1.36 NG/DL (ref 0.9–1.8)
TSH HIGH SENSITIVITY: 3.29 UIU/ML (ref 0.27–4.2)

## 2019-04-29 PROCEDURE — 84443 ASSAY THYROID STIM HORMONE: CPT

## 2019-04-29 PROCEDURE — 84439 ASSAY OF FREE THYROXINE: CPT

## 2019-05-16 ENCOUNTER — HOSPITAL ENCOUNTER (OUTPATIENT)
Dept: CT IMAGING | Age: 64
Discharge: HOME | End: 2019-05-16
Payer: COMMERCIAL

## 2019-05-16 ENCOUNTER — HOSPITAL ENCOUNTER (OUTPATIENT)
Dept: INFUSION THERAPY | Age: 64
Setting detail: INFUSION SERIES
Discharge: HOME | End: 2019-05-16
Payer: COMMERCIAL

## 2019-05-16 ENCOUNTER — APPOINTMENT (OUTPATIENT)
Dept: CT IMAGING | Age: 64
End: 2019-05-16
Payer: COMMERCIAL

## 2019-05-16 VITALS
RESPIRATION RATE: 14 BRPM | DIASTOLIC BLOOD PRESSURE: 75 MMHG | TEMPERATURE: 98.3 F | OXYGEN SATURATION: 96 % | SYSTOLIC BLOOD PRESSURE: 146 MMHG | HEART RATE: 86 BPM

## 2019-05-16 DIAGNOSIS — C34.11 CANCER OF BRONCHUS OF RIGHT UPPER LOBE (HCC): ICD-10-CM

## 2019-05-16 LAB
BUN BLDV-MCNC: 9 MG/DL (ref 6–23)
CREAT SERPL-MCNC: 0.7 MG/DL (ref 0.6–1.1)
GFR AFRICAN AMERICAN: >60 ML/MIN/1.73M2
GFR NON-AFRICAN AMERICAN: >60 ML/MIN/1.73M2

## 2019-05-16 PROCEDURE — 70491 CT SOFT TISSUE NECK W/DYE: CPT

## 2019-05-16 PROCEDURE — 6360000004 HC RX CONTRAST MEDICATION: Performed by: INTERNAL MEDICINE

## 2019-05-16 PROCEDURE — 71260 CT THORAX DX C+: CPT

## 2019-05-16 PROCEDURE — 99211 OFF/OP EST MAY X REQ PHY/QHP: CPT

## 2019-05-16 PROCEDURE — 74177 CT ABD & PELVIS W/CONTRAST: CPT

## 2019-05-16 PROCEDURE — 84520 ASSAY OF UREA NITROGEN: CPT

## 2019-05-16 PROCEDURE — 82565 ASSAY OF CREATININE: CPT

## 2019-05-16 PROCEDURE — 2580000003 HC RX 258: Performed by: INTERNAL MEDICINE

## 2019-05-16 PROCEDURE — 6360000002 HC RX W HCPCS: Performed by: INTERNAL MEDICINE

## 2019-05-16 PROCEDURE — 36591 DRAW BLOOD OFF VENOUS DEVICE: CPT

## 2019-05-16 RX ORDER — HEPARIN SODIUM (PORCINE) LOCK FLUSH IV SOLN 100 UNIT/ML 100 UNIT/ML
500 SOLUTION INTRAVENOUS PRN
Status: DISCONTINUED | OUTPATIENT
Start: 2019-05-16 | End: 2019-05-17 | Stop reason: HOSPADM

## 2019-05-16 RX ORDER — SODIUM CHLORIDE 0.9 % (FLUSH) 0.9 %
10 SYRINGE (ML) INJECTION PRN
Status: DISCONTINUED | OUTPATIENT
Start: 2019-05-16 | End: 2019-05-17 | Stop reason: HOSPADM

## 2019-05-16 RX ADMIN — SODIUM CHLORIDE, PRESERVATIVE FREE 10 ML: 5 INJECTION INTRAVENOUS at 10:10

## 2019-05-16 RX ADMIN — IOPAMIDOL 80 ML: 755 INJECTION, SOLUTION INTRAVENOUS at 10:36

## 2019-05-16 RX ADMIN — Medication 500 UNITS: at 10:12

## 2019-05-16 RX ADMIN — SODIUM CHLORIDE, PRESERVATIVE FREE 10 ML: 5 INJECTION INTRAVENOUS at 10:11

## 2019-05-16 RX ADMIN — IOHEXOL 50 ML: 240 INJECTION, SOLUTION INTRATHECAL; INTRAVASCULAR; INTRAVENOUS; ORAL at 10:36

## 2019-05-16 NOTE — PROGRESS NOTES
Port accessed without difficulty, good blood return and lab drawn. Pt transport to registration via wheelchair. Chester Mclaughlin, registration, and CT made aware of pts whereabouts.

## 2019-05-16 NOTE — PROGRESS NOTES
Reviewed discharge instructions, voiced understanding, and copies of AVS given to take home. Pt tolerated appointment well, with no s/s of an allergic reaction. Pt to private auto via steady gait.

## 2019-05-22 ENCOUNTER — HOSPITAL ENCOUNTER (OUTPATIENT)
Age: 64
Setting detail: SPECIMEN
Discharge: HOME | End: 2019-05-22
Payer: COMMERCIAL

## 2019-05-22 LAB
ALBUMIN SERPL-MCNC: 3.8 GM/DL (ref 3.4–5)
ALP BLD-CCNC: 98 IU/L (ref 40–128)
ALT SERPL-CCNC: 20 U/L (ref 10–40)
ANION GAP SERPL CALCULATED.3IONS-SCNC: 13 MMOL/L (ref 4–16)
AST SERPL-CCNC: 23 IU/L (ref 15–37)
BILIRUB SERPL-MCNC: 0.2 MG/DL (ref 0–1)
BUN BLDV-MCNC: 7 MG/DL (ref 6–23)
CALCIUM SERPL-MCNC: 9.4 MG/DL (ref 8.3–10.6)
CHLORIDE BLD-SCNC: 100 MMOL/L (ref 99–110)
CO2: 25 MMOL/L (ref 21–32)
CREAT SERPL-MCNC: 0.7 MG/DL (ref 0.6–1.1)
GFR AFRICAN AMERICAN: >60 ML/MIN/1.73M2
GFR NON-AFRICAN AMERICAN: >60 ML/MIN/1.73M2
GLUCOSE BLD-MCNC: 85 MG/DL (ref 70–99)
POTASSIUM SERPL-SCNC: 4 MMOL/L (ref 3.5–5.1)
SODIUM BLD-SCNC: 138 MMOL/L (ref 135–145)
TOTAL PROTEIN: 6.3 GM/DL (ref 6.4–8.2)

## 2019-05-22 PROCEDURE — 80053 COMPREHEN METABOLIC PANEL: CPT

## 2019-06-12 ENCOUNTER — HOSPITAL ENCOUNTER (OUTPATIENT)
Age: 64
Setting detail: SPECIMEN
Discharge: HOME | End: 2019-06-12
Payer: COMMERCIAL

## 2019-06-12 LAB
ALBUMIN SERPL-MCNC: 4.1 GM/DL (ref 3.4–5)
ALP BLD-CCNC: 92 IU/L (ref 40–128)
ALT SERPL-CCNC: 18 U/L (ref 10–40)
ANION GAP SERPL CALCULATED.3IONS-SCNC: 12 MMOL/L (ref 4–16)
AST SERPL-CCNC: 22 IU/L (ref 15–37)
BILIRUB SERPL-MCNC: 0.2 MG/DL (ref 0–1)
BUN BLDV-MCNC: 9 MG/DL (ref 6–23)
CALCIUM SERPL-MCNC: 9.6 MG/DL (ref 8.3–10.6)
CHLORIDE BLD-SCNC: 100 MMOL/L (ref 99–110)
CO2: 26 MMOL/L (ref 21–32)
CREAT SERPL-MCNC: 0.7 MG/DL (ref 0.6–1.1)
GFR AFRICAN AMERICAN: >60 ML/MIN/1.73M2
GFR NON-AFRICAN AMERICAN: >60 ML/MIN/1.73M2
GLUCOSE BLD-MCNC: 85 MG/DL (ref 70–99)
POTASSIUM SERPL-SCNC: 4.2 MMOL/L (ref 3.5–5.1)
SODIUM BLD-SCNC: 138 MMOL/L (ref 135–145)
T4 FREE: 1.42 NG/DL (ref 0.9–1.8)
TOTAL PROTEIN: 6.7 GM/DL (ref 6.4–8.2)
TSH HIGH SENSITIVITY: 8.76 UIU/ML (ref 0.27–4.2)

## 2019-06-12 PROCEDURE — 84439 ASSAY OF FREE THYROXINE: CPT

## 2019-06-12 PROCEDURE — 84443 ASSAY THYROID STIM HORMONE: CPT

## 2019-06-12 PROCEDURE — 80053 COMPREHEN METABOLIC PANEL: CPT

## 2019-07-03 ENCOUNTER — HOSPITAL ENCOUNTER (OUTPATIENT)
Age: 64
Setting detail: SPECIMEN
Discharge: HOME | End: 2019-07-03
Payer: COMMERCIAL

## 2019-07-03 LAB
ALBUMIN SERPL-MCNC: 4 GM/DL (ref 3.4–5)
ALP BLD-CCNC: 86 IU/L (ref 40–128)
ALT SERPL-CCNC: 17 U/L (ref 10–40)
ANION GAP SERPL CALCULATED.3IONS-SCNC: 12 MMOL/L (ref 4–16)
AST SERPL-CCNC: 19 IU/L (ref 15–37)
BILIRUB SERPL-MCNC: 0.2 MG/DL (ref 0–1)
BUN BLDV-MCNC: 8 MG/DL (ref 6–23)
CALCIUM SERPL-MCNC: 9.3 MG/DL (ref 8.3–10.6)
CHLORIDE BLD-SCNC: 100 MMOL/L (ref 99–110)
CO2: 26 MMOL/L (ref 21–32)
CREAT SERPL-MCNC: 0.7 MG/DL (ref 0.6–1.1)
GFR AFRICAN AMERICAN: >60 ML/MIN/1.73M2
GFR NON-AFRICAN AMERICAN: >60 ML/MIN/1.73M2
GLUCOSE BLD-MCNC: 101 MG/DL (ref 70–99)
POTASSIUM SERPL-SCNC: 4.2 MMOL/L (ref 3.5–5.1)
SODIUM BLD-SCNC: 138 MMOL/L (ref 135–145)
T4 FREE: 1.74 NG/DL (ref 0.9–1.8)
TOTAL PROTEIN: 6.7 GM/DL (ref 6.4–8.2)
TSH HIGH SENSITIVITY: 1.2 UIU/ML (ref 0.27–4.2)

## 2019-07-03 PROCEDURE — 84439 ASSAY OF FREE THYROXINE: CPT

## 2019-07-03 PROCEDURE — 80053 COMPREHEN METABOLIC PANEL: CPT

## 2019-07-03 PROCEDURE — 84443 ASSAY THYROID STIM HORMONE: CPT

## 2019-07-24 ENCOUNTER — HOSPITAL ENCOUNTER (OUTPATIENT)
Age: 64
Setting detail: SPECIMEN
Discharge: HOME | End: 2019-07-24
Payer: COMMERCIAL

## 2019-07-24 LAB
ALBUMIN SERPL-MCNC: 4.2 GM/DL (ref 3.4–5)
ALP BLD-CCNC: 81 IU/L (ref 40–128)
ALT SERPL-CCNC: 21 U/L (ref 10–40)
ANION GAP SERPL CALCULATED.3IONS-SCNC: 14 MMOL/L (ref 4–16)
AST SERPL-CCNC: 23 IU/L (ref 15–37)
BILIRUB SERPL-MCNC: 0.2 MG/DL (ref 0–1)
BUN BLDV-MCNC: 9 MG/DL (ref 6–23)
CALCIUM SERPL-MCNC: 9.7 MG/DL (ref 8.3–10.6)
CHLORIDE BLD-SCNC: 99 MMOL/L (ref 99–110)
CO2: 25 MMOL/L (ref 21–32)
CREAT SERPL-MCNC: 0.6 MG/DL (ref 0.6–1.1)
GFR AFRICAN AMERICAN: >60 ML/MIN/1.73M2
GFR NON-AFRICAN AMERICAN: >60 ML/MIN/1.73M2
GLUCOSE BLD-MCNC: 94 MG/DL (ref 70–99)
POTASSIUM SERPL-SCNC: 4.1 MMOL/L (ref 3.5–5.1)
SODIUM BLD-SCNC: 138 MMOL/L (ref 135–145)
TOTAL PROTEIN: 6.7 GM/DL (ref 6.4–8.2)

## 2019-07-24 PROCEDURE — 80053 COMPREHEN METABOLIC PANEL: CPT

## 2019-08-05 ENCOUNTER — HOSPITAL ENCOUNTER (OUTPATIENT)
Age: 64
Discharge: HOME | End: 2019-08-05
Payer: COMMERCIAL

## 2019-08-05 ENCOUNTER — HOSPITAL ENCOUNTER (OUTPATIENT)
Dept: GENERAL RADIOLOGY | Age: 64
Discharge: HOME | End: 2019-08-05
Payer: COMMERCIAL

## 2019-08-05 DIAGNOSIS — M79.671 RIGHT FOOT PAIN: ICD-10-CM

## 2019-08-05 PROCEDURE — 73630 X-RAY EXAM OF FOOT: CPT

## 2019-08-14 ENCOUNTER — HOSPITAL ENCOUNTER (OUTPATIENT)
Age: 64
Setting detail: SPECIMEN
Discharge: HOME | End: 2019-08-14
Payer: COMMERCIAL

## 2019-08-14 LAB
ALBUMIN SERPL-MCNC: 4.1 GM/DL (ref 3.4–5)
ALP BLD-CCNC: 91 IU/L (ref 40–128)
ALT SERPL-CCNC: 22 U/L (ref 10–40)
ANION GAP SERPL CALCULATED.3IONS-SCNC: 11 MMOL/L (ref 4–16)
AST SERPL-CCNC: 24 IU/L (ref 15–37)
BILIRUB SERPL-MCNC: 0.2 MG/DL (ref 0–1)
BUN BLDV-MCNC: 8 MG/DL (ref 6–23)
CALCIUM SERPL-MCNC: 9.9 MG/DL (ref 8.3–10.6)
CHLORIDE BLD-SCNC: 98 MMOL/L (ref 99–110)
CO2: 27 MMOL/L (ref 21–32)
CREAT SERPL-MCNC: 0.6 MG/DL (ref 0.6–1.1)
GFR AFRICAN AMERICAN: >60 ML/MIN/1.73M2
GFR NON-AFRICAN AMERICAN: >60 ML/MIN/1.73M2
GLUCOSE BLD-MCNC: 90 MG/DL (ref 70–99)
POTASSIUM SERPL-SCNC: 4.1 MMOL/L (ref 3.5–5.1)
SODIUM BLD-SCNC: 136 MMOL/L (ref 135–145)
T4 FREE: 1.51 NG/DL (ref 0.9–1.8)
TOTAL PROTEIN: 6.6 GM/DL (ref 6.4–8.2)
TSH HIGH SENSITIVITY: 1.78 UIU/ML (ref 0.27–4.2)

## 2019-08-14 PROCEDURE — 84443 ASSAY THYROID STIM HORMONE: CPT

## 2019-08-14 PROCEDURE — 80053 COMPREHEN METABOLIC PANEL: CPT

## 2019-08-14 PROCEDURE — 84439 ASSAY OF FREE THYROXINE: CPT

## 2019-08-27 ENCOUNTER — HOSPITAL ENCOUNTER (OUTPATIENT)
Dept: INFUSION THERAPY | Age: 64
Setting detail: INFUSION SERIES
Discharge: HOME | End: 2019-08-27
Payer: COMMERCIAL

## 2019-08-27 ENCOUNTER — HOSPITAL ENCOUNTER (OUTPATIENT)
Dept: CT IMAGING | Age: 64
Discharge: HOME | End: 2019-08-27
Payer: COMMERCIAL

## 2019-08-27 VITALS
DIASTOLIC BLOOD PRESSURE: 67 MMHG | HEART RATE: 78 BPM | TEMPERATURE: 98.1 F | SYSTOLIC BLOOD PRESSURE: 118 MMHG | RESPIRATION RATE: 16 BRPM | OXYGEN SATURATION: 98 %

## 2019-08-27 DIAGNOSIS — C34.11 CANCER OF BRONCHUS OF RIGHT UPPER LOBE (HCC): ICD-10-CM

## 2019-08-27 PROCEDURE — 2580000003 HC RX 258: Performed by: INTERNAL MEDICINE

## 2019-08-27 PROCEDURE — 6360000004 HC RX CONTRAST MEDICATION: Performed by: INTERNAL MEDICINE

## 2019-08-27 PROCEDURE — 99211 OFF/OP EST MAY X REQ PHY/QHP: CPT

## 2019-08-27 PROCEDURE — 6360000002 HC RX W HCPCS: Performed by: INTERNAL MEDICINE

## 2019-08-27 PROCEDURE — 71260 CT THORAX DX C+: CPT

## 2019-08-27 PROCEDURE — 96523 IRRIG DRUG DELIVERY DEVICE: CPT

## 2019-08-27 PROCEDURE — 74177 CT ABD & PELVIS W/CONTRAST: CPT

## 2019-08-27 PROCEDURE — 70491 CT SOFT TISSUE NECK W/DYE: CPT

## 2019-08-27 RX ORDER — 0.9 % SODIUM CHLORIDE 0.9 %
10 VIAL (ML) INJECTION
Status: COMPLETED | OUTPATIENT
Start: 2019-08-27 | End: 2019-08-27

## 2019-08-27 RX ORDER — HEPARIN SODIUM (PORCINE) LOCK FLUSH IV SOLN 100 UNIT/ML 100 UNIT/ML
500 SOLUTION INTRAVENOUS PRN
Status: DISCONTINUED | OUTPATIENT
Start: 2019-08-27 | End: 2019-08-28 | Stop reason: HOSPADM

## 2019-08-27 RX ORDER — SODIUM CHLORIDE 0.9 % (FLUSH) 0.9 %
10 SYRINGE (ML) INJECTION PRN
Status: DISCONTINUED | OUTPATIENT
Start: 2019-08-27 | End: 2019-08-28 | Stop reason: HOSPADM

## 2019-08-27 RX ADMIN — IOHEXOL 50 ML: 240 INJECTION, SOLUTION INTRATHECAL; INTRAVASCULAR; INTRAVENOUS; ORAL at 11:28

## 2019-08-27 RX ADMIN — Medication 10 ML: at 11:29

## 2019-08-27 RX ADMIN — Medication 20 ML: at 11:45

## 2019-08-27 RX ADMIN — Medication 500 UNITS: at 11:45

## 2019-08-27 RX ADMIN — Medication 10 ML: at 09:44

## 2019-08-27 RX ADMIN — IOPAMIDOL 75 ML: 755 INJECTION, SOLUTION INTRAVENOUS at 11:29

## 2019-08-27 NOTE — PROGRESS NOTES
Pt back to unit for flush after CT Scan. Reviewed discharge instructions, voiced understanding, and copies of AVS given to take home. Pt tolerated port flush well. Pt to private auto via [facility].

## 2019-08-27 NOTE — PROGRESS NOTES
Reviewed discharge instructions, voiced understanding, and copies of AVS given to take home. Pt tolerated phlebotomy well. Pt to private auto via ambulation with friend at side.

## 2019-08-30 ENCOUNTER — APPOINTMENT (OUTPATIENT)
Dept: GENERAL RADIOLOGY | Age: 64
End: 2019-08-30
Payer: COMMERCIAL

## 2019-08-30 ENCOUNTER — HOSPITAL ENCOUNTER (EMERGENCY)
Age: 64
Discharge: HOME | End: 2019-08-30
Payer: COMMERCIAL

## 2019-08-30 VITALS
DIASTOLIC BLOOD PRESSURE: 72 MMHG | HEART RATE: 82 BPM | OXYGEN SATURATION: 97 % | WEIGHT: 158 LBS | SYSTOLIC BLOOD PRESSURE: 139 MMHG | HEIGHT: 63 IN | TEMPERATURE: 98 F | RESPIRATION RATE: 16 BRPM | BODY MASS INDEX: 28 KG/M2

## 2019-08-30 DIAGNOSIS — S63.501A SPRAIN OF RIGHT WRIST, INITIAL ENCOUNTER: Primary | ICD-10-CM
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
## 2019-08-30 PROCEDURE — 73110 X-RAY EXAM OF WRIST: CPT

## 2019-08-30 PROCEDURE — 4500000028 HC INTERMEDIATE PROCEDURE

## 2019-08-30 PROCEDURE — 99283 EMERGENCY DEPT VISIT LOW MDM: CPT

## 2019-08-30 ASSESSMENT — PAIN SCALES - GENERAL: PAINLEVEL_OUTOF10: 5

## 2019-08-30 ASSESSMENT — PAIN DESCRIPTION - LOCATION: LOCATION: WRIST

## 2019-08-30 ASSESSMENT — PAIN DESCRIPTION - ORIENTATION: ORIENTATION: RIGHT

## 2019-08-30 ASSESSMENT — PAIN DESCRIPTION - PAIN TYPE: TYPE: ACUTE PAIN

## 2019-08-30 NOTE — ED NOTES
Discharge instructions given to pt, pt verbalized understanding. All questions answered. Follow-up instructions given.      Anne Douglas RN  08/30/19 1713

## 2019-08-30 NOTE — ED PROVIDER NOTES
Take 1 each by mouth nightly      aspirin 325 MG tablet Take by mouth      Cholecalciferol 1000 units CHEW Take 2,000 Units by mouth      coenzyme Q10 60 MG CAPS Take 60 mg by mouth nightly       RA KRILL OIL CAPS Take 1 capsule by mouth nightly       Acidophilus Lactobacillus CAPS Take by mouth      levothyroxine (SYNTHROID) 175 MCG tablet Take 175 mcg by mouth       Multiple Vitamins-Minerals (HAIR/SKIN/NAILS) TABS Take 1 tablet by mouth daily       NIACIN CR PO Take 1 tablet by mouth nightly       prochlorperazine (COMPAZINE) 10 MG tablet Take 10 mg by mouth as needed       ondansetron (ZOFRAN-ODT) 8 MG TBDP disintegrating tablet Take 8 mg by mouth as needed for Nausea                Review of Systems:  Review of Systems  Positives and Pertinent negatives as per HPI. Except as noted above in the ROS, problem specific ROS was completed and is negative. Physical Exam:  Physical Exam   Constitutional: She is oriented to person, place, and time. She appears well-developed and well-nourished. HENT:   Head: Normocephalic and atraumatic. Right Ear: External ear normal.   Left Ear: External ear normal.   Nose: Nose normal.   Eyes: Right eye exhibits no discharge. Left eye exhibits no discharge. Neck: Normal range of motion. Neck supple. Pulmonary/Chest: Effort normal. No stridor. No respiratory distress. Musculoskeletal:        Right wrist: She exhibits decreased range of motion, tenderness, bony tenderness and swelling. Arms:  Anatomic snuffbox tenderness noted   Neurological: She is alert and oriented to person, place, and time. Skin: Skin is warm and dry. She is not diaphoretic. No pallor. Psychiatric: She has a normal mood and affect. Her behavior is normal.   Nursing note and vitals reviewed.       MEDICAL DECISION MAKING    Vitals:    Vitals:    08/30/19 1154   BP: 139/72   Pulse: 82   Resp: 16   Temp: 98 °F (36.7 °C)   TempSrc: Oral   SpO2: 97%   Weight: 158 lb (71.7 kg)   Height: 5' 3\" right wrist, initial encounter    2.  Fall on same level from slipping, tripping or stumbling, initial encounter        DISPOSITION Decision To Discharge 08/30/2019 12:24:46 PM      PATIENT REFERRED TO:  Marimar Boykin MD  [address] [address] [id number] [phone]    Call today  For a recheck in 3-7 days      DISCHARGE MEDICATIONS:  Current Discharge Medication List          DISCONTINUED MEDICATIONS:  Current Discharge Medication List                 (Please note the MDM and HPI sections of this note were completed with a voice recognition program.  Efforts weremade to edit the dictations but occasionally words are mis-transcribed.)    Electronically signed, Aguilar Vail,          Aguilar Vail  08/30/19 8573

## 2019-09-23 ENCOUNTER — HOSPITAL ENCOUNTER (OUTPATIENT)
Age: 64
Setting detail: SPECIMEN
Discharge: HOME | End: 2019-09-23
Payer: COMMERCIAL

## 2019-09-23 ENCOUNTER — HOSPITAL ENCOUNTER (OUTPATIENT)
Dept: RADIATION ONCOLOGY | Age: 64
Discharge: HOME | End: 2019-09-23
Payer: COMMERCIAL

## 2019-09-23 VITALS
HEIGHT: 63 IN | RESPIRATION RATE: 16 BRPM | BODY MASS INDEX: 27.82 KG/M2 | WEIGHT: 157 LBS | HEART RATE: 83 BPM | DIASTOLIC BLOOD PRESSURE: 73 MMHG | OXYGEN SATURATION: 99 % | SYSTOLIC BLOOD PRESSURE: 143 MMHG

## 2019-09-23 DIAGNOSIS — C79.49 SECONDARY MALIGNANT NEOPLASM OF BRAIN AND SPINAL CORD (HCC): ICD-10-CM

## 2019-09-23 DIAGNOSIS — C34.11 MALIGNANT NEOPLASM OF UPPER LOBE OF RIGHT LUNG (HCC): ICD-10-CM

## 2019-09-23 DIAGNOSIS — C79.31 SECONDARY MALIGNANT NEOPLASM OF BRAIN AND SPINAL CORD (HCC): ICD-10-CM

## 2019-09-23 LAB
ALBUMIN SERPL-MCNC: 4 GM/DL (ref 3.4–5)
ALP BLD-CCNC: 129 IU/L (ref 40–128)
ALT SERPL-CCNC: 24 U/L (ref 10–40)
ANION GAP SERPL CALCULATED.3IONS-SCNC: 12 MMOL/L (ref 4–16)
AST SERPL-CCNC: 25 IU/L (ref 15–37)
BILIRUB SERPL-MCNC: 0.2 MG/DL (ref 0–1)
BUN BLDV-MCNC: 12 MG/DL (ref 6–23)
CALCIUM SERPL-MCNC: 9.4 MG/DL (ref 8.3–10.6)
CHLORIDE BLD-SCNC: 100 MMOL/L (ref 99–110)
CO2: 27 MMOL/L (ref 21–32)
CREAT SERPL-MCNC: 0.6 MG/DL (ref 0.6–1.1)
GFR AFRICAN AMERICAN: >60 ML/MIN/1.73M2
GFR NON-AFRICAN AMERICAN: >60 ML/MIN/1.73M2
GLUCOSE BLD-MCNC: 105 MG/DL (ref 70–99)
POTASSIUM SERPL-SCNC: 4.2 MMOL/L (ref 3.5–5.1)
SODIUM BLD-SCNC: 139 MMOL/L (ref 135–145)
TOTAL PROTEIN: 6.6 GM/DL (ref 6.4–8.2)

## 2019-09-23 PROCEDURE — 80053 COMPREHEN METABOLIC PANEL: CPT

## 2019-09-23 RX ORDER — BENZONATATE 100 MG/1
200 CAPSULE ORAL 3 TIMES DAILY PRN
COMMUNITY

## 2019-09-23 RX ORDER — DEXAMETHASONE 4 MG/1
4 TABLET ORAL DAILY
COMMUNITY

## 2019-09-23 RX ORDER — OXYCODONE HYDROCHLORIDE AND ACETAMINOPHEN 5; 325 MG/1; MG/1
1 TABLET ORAL EVERY 4 HOURS PRN
COMMUNITY
End: 2020-08-03 | Stop reason: SDUPTHER

## 2019-09-23 RX ORDER — ALPRAZOLAM 0.5 MG/1
0.5 TABLET ORAL 3 TIMES DAILY PRN
COMMUNITY
End: 2020-01-03

## 2019-09-23 RX ORDER — LORAZEPAM 0.5 MG/1
0.5 TABLET ORAL EVERY 6 HOURS PRN
COMMUNITY

## 2019-09-23 RX ORDER — OLANZAPINE 5 MG/1
5 TABLET ORAL NIGHTLY
COMMUNITY
End: 2020-01-03 | Stop reason: ALTCHOICE

## 2019-09-23 ASSESSMENT — PAIN SCALES - GENERAL: PAINLEVEL_OUTOF10: 0

## 2019-09-23 NOTE — CONSULTS
Component Value Date    WBC 5.9 03/14/2019    RBC 2.18 03/14/2019    HGB 7.1 03/14/2019    HCT 22.3 03/14/2019    .3 03/14/2019    MCH 32.6 03/14/2019    MCHC 31.8 03/14/2019    RDW 15.6 03/14/2019    PLT 49 03/14/2019    MPV 13.0 03/14/2019     CMP:  Lab Results   Component Value Date     09/23/2019    K 4.2 09/23/2019     09/23/2019    CO2 27 09/23/2019    BUN 12 09/23/2019    CREATININE 0.6 09/23/2019    GFRAA >60 09/23/2019    LABGLOM >60 09/23/2019    GLUCOSE 105 09/23/2019    PROT 6.6 09/23/2019    LABALBU 4.0 09/23/2019    CALCIUM 9.4 09/23/2019    BILITOT 0.2 09/23/2019    ALKPHOS 129 09/23/2019    AST 25 09/23/2019    ALT 24 09/23/2019     Onc labs: No results found for: PSA, CEA, LDH, AFP      RADIOLOGIC STUDIES:     Xr Wrist Right (min 3 Views)    Result Date: 8/30/2019  EXAMINATION: 3 XRAY VIEWS OF THE RIGHT WRIST 8/30/2019 11:42 am COMPARISON: None. HISTORY: ORDERING SYSTEM PROVIDED HISTORY: injury TECHNOLOGIST PROVIDED HISTORY: Reason for exam:->injury Reason for Exam: injury Acuity: Acute Type of Exam: Initial Mechanism of Injury: fell FINDINGS: The bones are osteopenic. There are degenerative changes involving the radiocarpal joint. There also degenerative changes involving the 1st carpometacarpal joint. No acute fractures or dislocations are seen. There is negative ulnar variance. 1. No acute traumatic abnormality involving the right wrist.     Ct Soft Tissue Neck W Contrast    Result Date: 8/27/2019  EXAMINATION: CT OF THE NECK SOFT TISSUE WITH CONTRAST  8/27/2019 TECHNIQUE: CT of the neck was performed with the administration of intravenous contrast. Multiplanar reformatted images are provided for review. Dose modulation, iterative reconstruction, and/or weight based adjustment of the mA/kV was utilized to reduce the radiation dose to as low as reasonably achievable. COMPARISON: 05/16/2019.  HISTORY: ORDERING SYSTEM PROVIDED HISTORY: Cancer of bronchus of right upper contrast. Multiplanar reformatted images are  provided for review. Dose modulation, iterative reconstruction, and/or weight  based adjustment of the mA/kV was utilized to reduce the radiation dose to as  low as reasonably achievable. COMPARISON:  5/16/2019    HISTORY:  ORDERING SYSTEM PROVIDED HISTORY: Cancer of bronchus of right upper lobe [facility])  TECHNOLOGIST PROVIDED HISTORY:  Reason for Exam: lung cancer with mets  Acuity: Unknown  Type of Exam: Unknown  Additional signs and symptoms: none  Relevant Medical/Surgical History: contrast used from soft tissue neck/  gfr>60 8-14-19; ORDERING SYSTEM PROVIDED HISTORY: Cancer of bronchus of right  upper lobe ([address]  TECHNOLOGIST PROVIDED HISTORY:  Additional Contrast?->Oral  Reason for Exam: lung cancer with mets  Acuity: Unknown  Type of Exam: Unknown  Additional signs and symptoms: none  Relevant Medical/Surgical History: contrast used from soft tissue neck/  gfr>60 8-14-19    FINDINGS:    Chest:    Mediastinum: Left chest wall port is noted. There is no axillary  lymphadenopathy. There is no mediastinal lymphadenopathy. There is no left  hilar lymphadenopathy. Similar appearance of soft tissue density in the  right hilar region, likely representing post treatment changes. The heart is  normal in size. There is no pericardial effusion. Atherosclerotic changes  of the aorta are noted. Coronary artery calcifications are noted. Lungs/pleura: Stable biapical pleuroparenchymal changes are noted. Similar  appearance of right hilar infiltrating soft tissue density which may  represent post treatment changes. Residual or recurrent disease cannot  entirely be excluded. Bibasilar atelectasis is noted. There is no pleural  effusion or pneumothorax. Soft Tissues/Bones: Multilevel degenerative changes of the spine are noted. Stable sclerotic appearance of the T6 vertebral body with compression  deformity.       Abdomen/Pelvis:    Organs: Several hypodense

## 2019-10-01 ENCOUNTER — HOSPITAL ENCOUNTER (OUTPATIENT)
Age: 64
Setting detail: SPECIMEN
Discharge: HOME | End: 2019-10-01
Payer: COMMERCIAL

## 2019-10-01 LAB
ALBUMIN SERPL-MCNC: 3.7 GM/DL (ref 3.4–5)
ALP BLD-CCNC: 82 IU/L (ref 40–128)
ALT SERPL-CCNC: 36 U/L (ref 10–40)
ANION GAP SERPL CALCULATED.3IONS-SCNC: 9 MMOL/L (ref 4–16)
AST SERPL-CCNC: 30 IU/L (ref 15–37)
BILIRUB SERPL-MCNC: 0.2 MG/DL (ref 0–1)
BUN BLDV-MCNC: 7 MG/DL (ref 6–23)
CALCIUM SERPL-MCNC: 8.7 MG/DL (ref 8.3–10.6)
CHLORIDE BLD-SCNC: 97 MMOL/L (ref 99–110)
CO2: 29 MMOL/L (ref 21–32)
CREAT SERPL-MCNC: 0.6 MG/DL (ref 0.6–1.1)
GFR AFRICAN AMERICAN: >60 ML/MIN/1.73M2
GFR NON-AFRICAN AMERICAN: >60 ML/MIN/1.73M2
GLUCOSE BLD-MCNC: 113 MG/DL (ref 70–99)
POTASSIUM SERPL-SCNC: 4 MMOL/L (ref 3.5–5.1)
SODIUM BLD-SCNC: 135 MMOL/L (ref 135–145)
TOTAL PROTEIN: 5.8 GM/DL (ref 6.4–8.2)

## 2019-10-01 PROCEDURE — 80053 COMPREHEN METABOLIC PANEL: CPT

## 2019-10-08 ENCOUNTER — HOSPITAL ENCOUNTER (OUTPATIENT)
Dept: PHYSICAL THERAPY | Age: 64
Setting detail: THERAPIES SERIES
Discharge: HOME | End: 2019-10-08
Payer: COMMERCIAL

## 2019-10-08 PROCEDURE — 97116 GAIT TRAINING THERAPY: CPT

## 2019-10-08 PROCEDURE — 97162 PT EVAL MOD COMPLEX 30 MIN: CPT

## 2019-10-08 ASSESSMENT — PAIN DESCRIPTION - LOCATION: LOCATION: BACK;LEG

## 2019-10-08 ASSESSMENT — PAIN DESCRIPTION - PROGRESSION: CLINICAL_PROGRESSION: GRADUALLY WORSENING

## 2019-10-08 ASSESSMENT — PAIN DESCRIPTION - ORIENTATION: ORIENTATION: LEFT

## 2019-10-08 ASSESSMENT — PAIN DESCRIPTION - DESCRIPTORS: DESCRIPTORS: ACHING

## 2019-10-08 ASSESSMENT — PAIN DESCRIPTION - FREQUENCY: FREQUENCY: INTERMITTENT

## 2019-10-08 ASSESSMENT — PAIN DESCRIPTION - DIRECTION: RADIATING_TOWARDS: LEFT LEG

## 2019-10-08 ASSESSMENT — PAIN DESCRIPTION - PAIN TYPE: TYPE: CHRONIC PAIN

## 2019-10-08 ASSESSMENT — PAIN SCALES - GENERAL: PAINLEVEL_OUTOF10: 4

## 2019-10-14 ENCOUNTER — HOSPITAL ENCOUNTER (OUTPATIENT)
Age: 64
Setting detail: SPECIMEN
Discharge: HOME | End: 2019-10-14
Payer: COMMERCIAL

## 2019-10-14 LAB
ALBUMIN SERPL-MCNC: 3.9 GM/DL (ref 3.4–5)
ALP BLD-CCNC: 86 IU/L (ref 40–129)
ALT SERPL-CCNC: 40 U/L (ref 10–40)
ANION GAP SERPL CALCULATED.3IONS-SCNC: 10 MMOL/L (ref 4–16)
AST SERPL-CCNC: 34 IU/L (ref 15–37)
BILIRUB SERPL-MCNC: 0.1 MG/DL (ref 0–1)
BUN BLDV-MCNC: 9 MG/DL (ref 6–23)
CALCIUM SERPL-MCNC: 8.6 MG/DL (ref 8.3–10.6)
CHLORIDE BLD-SCNC: 101 MMOL/L (ref 99–110)
CO2: 28 MMOL/L (ref 21–32)
CREAT SERPL-MCNC: 0.7 MG/DL (ref 0.6–1.1)
GFR AFRICAN AMERICAN: >60 ML/MIN/1.73M2
GFR NON-AFRICAN AMERICAN: >60 ML/MIN/1.73M2
GLUCOSE BLD-MCNC: 92 MG/DL (ref 70–99)
POTASSIUM SERPL-SCNC: 4.4 MMOL/L (ref 3.5–5.1)
SODIUM BLD-SCNC: 139 MMOL/L (ref 135–145)
TOTAL PROTEIN: 6 GM/DL (ref 6.4–8.2)

## 2019-10-14 PROCEDURE — 80053 COMPREHEN METABOLIC PANEL: CPT

## 2019-10-15 ENCOUNTER — HOSPITAL ENCOUNTER (OUTPATIENT)
Dept: PHYSICAL THERAPY | Age: 64
Setting detail: THERAPIES SERIES
Discharge: HOME | End: 2019-10-15
Payer: COMMERCIAL

## 2019-10-15 PROCEDURE — 97112 NEUROMUSCULAR REEDUCATION: CPT

## 2019-10-15 PROCEDURE — 97110 THERAPEUTIC EXERCISES: CPT

## 2019-10-22 ENCOUNTER — HOSPITAL ENCOUNTER (OUTPATIENT)
Dept: PHYSICAL THERAPY | Age: 64
Setting detail: THERAPIES SERIES
Discharge: HOME | End: 2019-10-22
Payer: COMMERCIAL

## 2019-10-22 PROCEDURE — 97110 THERAPEUTIC EXERCISES: CPT

## 2019-10-22 PROCEDURE — 97116 GAIT TRAINING THERAPY: CPT

## 2019-10-24 ENCOUNTER — HOSPITAL ENCOUNTER (OUTPATIENT)
Dept: PHYSICAL THERAPY | Age: 64
Setting detail: THERAPIES SERIES
Discharge: HOME | End: 2019-10-24
Payer: COMMERCIAL

## 2019-10-24 PROCEDURE — 97110 THERAPEUTIC EXERCISES: CPT

## 2019-10-24 PROCEDURE — 97116 GAIT TRAINING THERAPY: CPT

## 2019-10-29 ENCOUNTER — HOSPITAL ENCOUNTER (OUTPATIENT)
Dept: PHYSICAL THERAPY | Age: 64
Setting detail: THERAPIES SERIES
Discharge: HOME | End: 2019-10-29
Payer: COMMERCIAL

## 2019-10-29 ENCOUNTER — HOSPITAL ENCOUNTER (OUTPATIENT)
Dept: OCCUPATIONAL THERAPY | Age: 64
Setting detail: THERAPIES SERIES
Discharge: HOME | End: 2019-10-29
Payer: COMMERCIAL

## 2019-10-29 PROCEDURE — 97112 NEUROMUSCULAR REEDUCATION: CPT

## 2019-10-29 PROCEDURE — 97530 THERAPEUTIC ACTIVITIES: CPT

## 2019-10-29 PROCEDURE — 95832 HC OT HAND EVALUATION: CPT

## 2019-10-29 PROCEDURE — 97110 THERAPEUTIC EXERCISES: CPT

## 2019-10-31 ENCOUNTER — HOSPITAL ENCOUNTER (OUTPATIENT)
Dept: PHYSICAL THERAPY | Age: 64
Setting detail: THERAPIES SERIES
Discharge: HOME | End: 2019-10-31
Payer: COMMERCIAL

## 2019-10-31 ENCOUNTER — HOSPITAL ENCOUNTER (OUTPATIENT)
Dept: OCCUPATIONAL THERAPY | Age: 64
Setting detail: THERAPIES SERIES
Discharge: HOME | End: 2019-10-31
Payer: COMMERCIAL

## 2019-10-31 PROCEDURE — 97112 NEUROMUSCULAR REEDUCATION: CPT

## 2019-10-31 PROCEDURE — 97110 THERAPEUTIC EXERCISES: CPT

## 2019-10-31 PROCEDURE — 97140 MANUAL THERAPY 1/> REGIONS: CPT

## 2019-10-31 PROCEDURE — 97530 THERAPEUTIC ACTIVITIES: CPT

## 2019-11-04 ENCOUNTER — HOSPITAL ENCOUNTER (OUTPATIENT)
Age: 64
Setting detail: SPECIMEN
Discharge: HOME | End: 2019-11-04
Payer: COMMERCIAL

## 2019-11-04 LAB
ALBUMIN SERPL-MCNC: 3.9 GM/DL (ref 3.4–5)
ALP BLD-CCNC: 85 IU/L (ref 40–128)
ALT SERPL-CCNC: 22 U/L (ref 10–40)
ANION GAP SERPL CALCULATED.3IONS-SCNC: 13 MMOL/L (ref 4–16)
AST SERPL-CCNC: 24 IU/L (ref 15–37)
BILIRUB SERPL-MCNC: 0.2 MG/DL (ref 0–1)
BUN BLDV-MCNC: 8 MG/DL (ref 6–23)
CALCIUM SERPL-MCNC: 9.1 MG/DL (ref 8.3–10.6)
CHLORIDE BLD-SCNC: 99 MMOL/L (ref 99–110)
CO2: 27 MMOL/L (ref 21–32)
CREAT SERPL-MCNC: 0.8 MG/DL (ref 0.6–1.1)
GFR AFRICAN AMERICAN: >60 ML/MIN/1.73M2
GFR NON-AFRICAN AMERICAN: >60 ML/MIN/1.73M2
GLUCOSE BLD-MCNC: 105 MG/DL (ref 70–99)
POTASSIUM SERPL-SCNC: 4.2 MMOL/L (ref 3.5–5.1)
SODIUM BLD-SCNC: 139 MMOL/L (ref 135–145)
TOTAL PROTEIN: 6.1 GM/DL (ref 6.4–8.2)

## 2019-11-04 PROCEDURE — 80053 COMPREHEN METABOLIC PANEL: CPT

## 2019-11-05 ENCOUNTER — HOSPITAL ENCOUNTER (OUTPATIENT)
Dept: PHYSICAL THERAPY | Age: 64
Setting detail: INFUSION SERIES
Discharge: HOME | End: 2019-11-05
Payer: COMMERCIAL

## 2019-11-05 PROCEDURE — 97116 GAIT TRAINING THERAPY: CPT

## 2019-11-05 PROCEDURE — 97110 THERAPEUTIC EXERCISES: CPT

## 2019-11-05 PROCEDURE — 97112 NEUROMUSCULAR REEDUCATION: CPT

## 2019-11-11 ENCOUNTER — HOSPITAL ENCOUNTER (OUTPATIENT)
Dept: PHYSICAL THERAPY | Age: 64
Discharge: HOME | End: 2019-11-11

## 2019-11-11 ENCOUNTER — HOSPITAL ENCOUNTER (OUTPATIENT)
Dept: OCCUPATIONAL THERAPY | Age: 64
Discharge: HOME | End: 2019-11-11

## 2019-11-14 ENCOUNTER — HOSPITAL ENCOUNTER (OUTPATIENT)
Dept: OCCUPATIONAL THERAPY | Age: 64
Setting detail: INFUSION SERIES
Discharge: HOME | End: 2019-11-14
Payer: COMMERCIAL

## 2019-11-14 ENCOUNTER — HOSPITAL ENCOUNTER (OUTPATIENT)
Dept: PHYSICAL THERAPY | Age: 64
Setting detail: INFUSION SERIES
Discharge: HOME | End: 2019-11-14
Payer: COMMERCIAL

## 2019-11-14 PROCEDURE — 97110 THERAPEUTIC EXERCISES: CPT

## 2019-11-14 PROCEDURE — 97530 THERAPEUTIC ACTIVITIES: CPT

## 2019-11-14 PROCEDURE — 97112 NEUROMUSCULAR REEDUCATION: CPT

## 2019-11-26 ENCOUNTER — HOSPITAL ENCOUNTER (OUTPATIENT)
Dept: PHYSICAL THERAPY | Age: 64
Setting detail: INFUSION SERIES
Discharge: HOME | End: 2019-11-26
Payer: COMMERCIAL

## 2019-11-26 PROCEDURE — 97110 THERAPEUTIC EXERCISES: CPT

## 2019-11-26 PROCEDURE — 97112 NEUROMUSCULAR REEDUCATION: CPT

## 2019-11-26 PROCEDURE — 97032 APPL MODALITY 1+ESTIM EA 15: CPT

## 2019-12-09 ENCOUNTER — HOSPITAL ENCOUNTER (OUTPATIENT)
Dept: CT IMAGING | Age: 64
Discharge: HOME | End: 2019-12-09
Payer: COMMERCIAL

## 2019-12-09 ENCOUNTER — HOSPITAL ENCOUNTER (OUTPATIENT)
Dept: INFUSION THERAPY | Age: 64
Setting detail: INFUSION SERIES
Discharge: HOME | End: 2019-12-09
Payer: COMMERCIAL

## 2019-12-09 DIAGNOSIS — C34.90 NON-SMALL CELL LUNG CANCER, UNSPECIFIED LATERALITY (HCC): ICD-10-CM

## 2019-12-09 LAB
GFR AFRICAN AMERICAN: >60 ML/MIN/1.73M2
GFR NON-AFRICAN AMERICAN: >60 ML/MIN/1.73M2
POC CREATININE: 0.6 MG/DL (ref 0.6–1.1)

## 2019-12-09 PROCEDURE — 70491 CT SOFT TISSUE NECK W/DYE: CPT

## 2019-12-09 PROCEDURE — 6360000004 HC RX CONTRAST MEDICATION: Performed by: INTERNAL MEDICINE

## 2019-12-09 PROCEDURE — 71260 CT THORAX DX C+: CPT

## 2019-12-09 PROCEDURE — 2580000003 HC RX 258: Performed by: INTERNAL MEDICINE

## 2019-12-09 PROCEDURE — 96523 IRRIG DRUG DELIVERY DEVICE: CPT

## 2019-12-09 PROCEDURE — 74177 CT ABD & PELVIS W/CONTRAST: CPT

## 2019-12-09 PROCEDURE — 99211 OFF/OP EST MAY X REQ PHY/QHP: CPT

## 2019-12-09 PROCEDURE — 6360000002 HC RX W HCPCS: Performed by: INTERNAL MEDICINE

## 2019-12-09 RX ORDER — SODIUM CHLORIDE 0.9 % (FLUSH) 0.9 %
10 SYRINGE (ML) INJECTION
Status: COMPLETED | OUTPATIENT
Start: 2019-12-09 | End: 2019-12-09

## 2019-12-09 RX ORDER — HEPARIN SODIUM (PORCINE) LOCK FLUSH IV SOLN 100 UNIT/ML 100 UNIT/ML
500 SOLUTION INTRAVENOUS PRN
Status: DISCONTINUED | OUTPATIENT
Start: 2019-12-09 | End: 2019-12-10 | Stop reason: HOSPADM

## 2019-12-09 RX ORDER — SODIUM CHLORIDE 0.9 % (FLUSH) 0.9 %
10 SYRINGE (ML) INJECTION PRN
Status: DISCONTINUED | OUTPATIENT
Start: 2019-12-09 | End: 2019-12-10 | Stop reason: HOSPADM

## 2019-12-09 RX ADMIN — Medication 500 UNITS: at 11:42

## 2019-12-09 RX ADMIN — IOPAMIDOL 75 ML: 755 INJECTION, SOLUTION INTRAVENOUS at 11:46

## 2019-12-09 RX ADMIN — Medication 10 ML: at 10:55

## 2019-12-09 RX ADMIN — Medication 10 ML: at 11:41

## 2019-12-09 RX ADMIN — IOHEXOL 50 ML: 240 INJECTION, SOLUTION INTRATHECAL; INTRAVASCULAR; INTRAVENOUS; ORAL at 11:46

## 2019-12-09 RX ADMIN — Medication 10 ML: at 11:40

## 2019-12-09 RX ADMIN — Medication 20 ML: at 08:37

## 2019-12-09 NOTE — PROGRESS NOTES
Pt taken to room 3, oriented to room, bed/chair controls, and call light. Needs met at present. Call light in reach. Pt agreeable for plan of care. Pt here for port access for CT Scan.

## 2019-12-09 NOTE — DISCHARGE SUMMARY
To unit room 2 for [facility]. Procedure and plan of care explained. Understanding verbalized. Tolerated  well. Discharge instructions explained written copy given. Understanding verbalized. Discharged home with spouse. Down to private auto per wheelchair.

## 2019-12-10 ENCOUNTER — HOSPITAL ENCOUNTER (OUTPATIENT)
Dept: PHYSICAL THERAPY | Age: 64
Setting detail: INFUSION SERIES
Discharge: HOME | End: 2019-12-10
Payer: COMMERCIAL

## 2019-12-10 PROCEDURE — 97110 THERAPEUTIC EXERCISES: CPT

## 2019-12-12 ENCOUNTER — HOSPITAL ENCOUNTER (OUTPATIENT)
Dept: PHYSICAL THERAPY | Age: 64
Discharge: HOME | End: 2019-12-12

## 2019-12-16 ENCOUNTER — HOSPITAL ENCOUNTER (OUTPATIENT)
Age: 64
Setting detail: SPECIMEN
Discharge: HOME | End: 2019-12-16
Payer: COMMERCIAL

## 2019-12-16 LAB
ALBUMIN SERPL-MCNC: 3.5 GM/DL (ref 3.4–5)
ALP BLD-CCNC: 79 IU/L (ref 40–129)
ALT SERPL-CCNC: 25 U/L (ref 10–40)
ANION GAP SERPL CALCULATED.3IONS-SCNC: 10 MMOL/L (ref 4–16)
AST SERPL-CCNC: 26 IU/L (ref 15–37)
BILIRUB SERPL-MCNC: 0.2 MG/DL (ref 0–1)
BUN BLDV-MCNC: 8 MG/DL (ref 6–23)
CALCIUM SERPL-MCNC: 8.8 MG/DL (ref 8.3–10.6)
CHLORIDE BLD-SCNC: 98 MMOL/L (ref 99–110)
CO2: 27 MMOL/L (ref 21–32)
CREAT SERPL-MCNC: 0.6 MG/DL (ref 0.6–1.1)
GFR AFRICAN AMERICAN: >60 ML/MIN/1.73M2
GFR NON-AFRICAN AMERICAN: >60 ML/MIN/1.73M2
GLUCOSE BLD-MCNC: 89 MG/DL (ref 70–99)
POTASSIUM SERPL-SCNC: 3.9 MMOL/L (ref 3.5–5.1)
SODIUM BLD-SCNC: 135 MMOL/L (ref 135–145)
TOTAL PROTEIN: 5.9 GM/DL (ref 6.4–8.2)

## 2019-12-16 PROCEDURE — 80053 COMPREHEN METABOLIC PANEL: CPT

## 2019-12-17 ENCOUNTER — HOSPITAL ENCOUNTER (OUTPATIENT)
Dept: PHYSICAL THERAPY | Age: 64
Discharge: HOME | End: 2019-12-17

## 2019-12-31 ENCOUNTER — HOSPITAL ENCOUNTER (OUTPATIENT)
Dept: PHYSICAL THERAPY | Age: 64
Discharge: HOME | End: 2019-12-31

## 2020-01-03 ENCOUNTER — HOSPITAL ENCOUNTER (OUTPATIENT)
Dept: INFUSION THERAPY | Age: 65
Setting detail: INFUSION SERIES
Discharge: HOME | End: 2020-01-03
Payer: COMMERCIAL

## 2020-01-03 VITALS
TEMPERATURE: 99 F | HEART RATE: 94 BPM | OXYGEN SATURATION: 93 % | DIASTOLIC BLOOD PRESSURE: 78 MMHG | HEIGHT: 63 IN | BODY MASS INDEX: 27.75 KG/M2 | WEIGHT: 156.6 LBS | RESPIRATION RATE: 14 BRPM | SYSTOLIC BLOOD PRESSURE: 151 MMHG

## 2020-01-03 LAB
ALBUMIN SERPL-MCNC: 3.6 GM/DL (ref 3.4–5)
ALP BLD-CCNC: 78 IU/L (ref 40–128)
ALT SERPL-CCNC: 14 U/L (ref 10–40)
ANION GAP SERPL CALCULATED.3IONS-SCNC: 12 MMOL/L (ref 4–16)
ANISOCYTOSIS: ABNORMAL
AST SERPL-CCNC: 17 IU/L (ref 15–37)
BANDED NEUTROPHILS ABSOLUTE COUNT: 1.04 K/CU MM
BANDED NEUTROPHILS RELATIVE PERCENT: 12 % (ref 5–11)
BASOPHILS ABSOLUTE: 0.1 K/CU MM
BASOPHILS RELATIVE PERCENT: 1 % (ref 0–1)
BILIRUB SERPL-MCNC: 0.2 MG/DL (ref 0–1)
BUN BLDV-MCNC: 4 MG/DL (ref 6–23)
CALCIUM SERPL-MCNC: 8.9 MG/DL (ref 8.3–10.6)
CHLORIDE BLD-SCNC: 102 MMOL/L (ref 99–110)
CO2: 27 MMOL/L (ref 21–32)
CREAT SERPL-MCNC: 0.5 MG/DL (ref 0.6–1.1)
DIFFERENTIAL TYPE: ABNORMAL
GFR AFRICAN AMERICAN: >60 ML/MIN/1.73M2
GFR NON-AFRICAN AMERICAN: >60 ML/MIN/1.73M2
GLUCOSE BLD-MCNC: 93 MG/DL (ref 70–99)
HCT VFR BLD CALC: 38 % (ref 37–47)
HEMOGLOBIN: 11.9 GM/DL (ref 12.5–16)
LYMPHOCYTES ABSOLUTE: 1.4 K/CU MM
LYMPHOCYTES RELATIVE PERCENT: 16 % (ref 24–44)
MCH RBC QN AUTO: 28 PG (ref 27–31)
MCHC RBC AUTO-ENTMCNC: 31.3 % (ref 32–36)
MCV RBC AUTO: 89.4 FL (ref 78–100)
MONOCYTES ABSOLUTE: 0.8 K/CU MM
MONOCYTES RELATIVE PERCENT: 9 % (ref 0–4)
PDW BLD-RTO: 17.4 % (ref 11.7–14.9)
PLATELET # BLD: 294 K/CU MM (ref 140–440)
PMV BLD AUTO: 10 FL (ref 7.5–11.1)
POTASSIUM SERPL-SCNC: 4.1 MMOL/L (ref 3.5–5.1)
RBC # BLD: 4.25 M/CU MM (ref 4.2–5.4)
RBC # BLD: ABNORMAL 10*6/UL
SEGMENTED NEUTROPHILS ABSOLUTE COUNT: 5.4 K/CU MM
SEGMENTED NEUTROPHILS RELATIVE PERCENT: 62 % (ref 36–66)
SODIUM BLD-SCNC: 141 MMOL/L (ref 135–145)
TOTAL PROTEIN: 5.9 GM/DL (ref 6.4–8.2)
WBC # BLD: 8.7 K/CU MM (ref 4–10.5)

## 2020-01-03 PROCEDURE — 80053 COMPREHEN METABOLIC PANEL: CPT

## 2020-01-03 PROCEDURE — 6360000002 HC RX W HCPCS: Performed by: INTERNAL MEDICINE

## 2020-01-03 PROCEDURE — 85007 BL SMEAR W/DIFF WBC COUNT: CPT

## 2020-01-03 PROCEDURE — 96523 IRRIG DRUG DELIVERY DEVICE: CPT

## 2020-01-03 PROCEDURE — 85027 COMPLETE CBC AUTOMATED: CPT

## 2020-01-03 PROCEDURE — 36591 DRAW BLOOD OFF VENOUS DEVICE: CPT

## 2020-01-03 PROCEDURE — 2580000003 HC RX 258: Performed by: INTERNAL MEDICINE

## 2020-01-03 PROCEDURE — 99211 OFF/OP EST MAY X REQ PHY/QHP: CPT

## 2020-01-03 RX ORDER — DIPHENHYDRAMINE HYDROCHLORIDE 50 MG/ML
25 INJECTION INTRAMUSCULAR; INTRAVENOUS ONCE
Status: CANCELLED | OUTPATIENT
Start: 2020-01-06

## 2020-01-03 RX ORDER — SODIUM CHLORIDE 0.9 % (FLUSH) 0.9 %
10 SYRINGE (ML) INJECTION PRN
Status: DISCONTINUED | OUTPATIENT
Start: 2020-01-03 | End: 2020-01-04 | Stop reason: HOSPADM

## 2020-01-03 RX ORDER — HEPARIN SODIUM (PORCINE) LOCK FLUSH IV SOLN 100 UNIT/ML 100 UNIT/ML
500 SOLUTION INTRAVENOUS PRN
Status: DISCONTINUED | OUTPATIENT
Start: 2020-01-03 | End: 2020-01-04 | Stop reason: HOSPADM

## 2020-01-03 RX ADMIN — SODIUM CHLORIDE, PRESERVATIVE FREE 10 ML: 5 INJECTION INTRAVENOUS at 10:00

## 2020-01-03 RX ADMIN — Medication 500 UNITS: at 10:00

## 2020-01-03 NOTE — PROGRESS NOTES
Tolerated port draw and flush well. Reviewed discharge instructions, voiced understanding, and copies of AVS given to take home. Pt to exit via [facility].     Orders Placed This Encounter   Medications    sodium chloride flush 0.9 % injection 10 mL    heparin flush 100 UNIT/ML injection 500 Units

## 2020-01-03 NOTE — PROGRESS NOTES
Called and spoke with Uriel Ellsworth RN to notify that pt was unable to void while here. Reports she has her urine specimen cup at home and always takes her urine in on her chemo days. Pt instructed to continue same routine. Voiced understanding.

## 2020-01-06 ENCOUNTER — HOSPITAL ENCOUNTER (OUTPATIENT)
Dept: INFUSION THERAPY | Age: 65
Discharge: HOME | End: 2020-01-06
Payer: COMMERCIAL

## 2020-01-06 VITALS — BODY MASS INDEX: 27.73 KG/M2 | HEIGHT: 63 IN | WEIGHT: 156.5 LBS

## 2020-01-06 LAB — PROTEIN UA: NEGATIVE MG/DL

## 2020-01-06 PROCEDURE — 96367 TX/PROPH/DG ADDL SEQ IV INF: CPT

## 2020-01-06 PROCEDURE — 2580000003 HC RX 258: Performed by: INTERNAL MEDICINE

## 2020-01-06 PROCEDURE — 96413 CHEMO IV INFUSION 1 HR: CPT

## 2020-01-06 PROCEDURE — 81003 URINALYSIS AUTO W/O SCOPE: CPT

## 2020-01-06 PROCEDURE — 96417 CHEMO IV INFUS EACH ADDL SEQ: CPT

## 2020-01-06 PROCEDURE — 6360000002 HC RX W HCPCS: Performed by: INTERNAL MEDICINE

## 2020-01-06 RX ORDER — DIPHENHYDRAMINE HYDROCHLORIDE 50 MG/ML
25 INJECTION INTRAMUSCULAR; INTRAVENOUS ONCE
Status: DISCONTINUED | OUTPATIENT
Start: 2020-01-06 | End: 2020-01-07 | Stop reason: HOSPADM

## 2020-01-24 ENCOUNTER — HOSPITAL ENCOUNTER (OUTPATIENT)
Dept: INFUSION THERAPY | Age: 65
Discharge: HOME | End: 2020-01-24
Payer: COMMERCIAL

## 2020-01-24 RX ORDER — ONDANSETRON 2 MG/ML
8 INJECTION INTRAMUSCULAR; INTRAVENOUS ONCE
Status: CANCELLED | OUTPATIENT
Start: 2020-01-27

## 2020-01-24 RX ORDER — DIPHENHYDRAMINE HYDROCHLORIDE 50 MG/ML
25 INJECTION INTRAMUSCULAR; INTRAVENOUS ONCE
Status: CANCELLED | OUTPATIENT
Start: 2020-01-27

## 2020-01-24 RX ORDER — DEXAMETHASONE SODIUM PHOSPHATE 10 MG/ML
6 INJECTION, SOLUTION INTRAMUSCULAR; INTRAVENOUS ONCE
Status: CANCELLED | OUTPATIENT
Start: 2020-01-27

## 2020-01-31 ENCOUNTER — HOSPITAL ENCOUNTER (OUTPATIENT)
Dept: INFUSION THERAPY | Age: 65
Discharge: HOME | End: 2020-01-31
Payer: COMMERCIAL

## 2020-01-31 LAB
ALBUMIN SERPL-MCNC: 3.4 GM/DL (ref 3.4–5)
ALP BLD-CCNC: 83 IU/L (ref 40–128)
ALT SERPL-CCNC: 9 U/L (ref 10–40)
ANION GAP SERPL CALCULATED.3IONS-SCNC: 10 MMOL/L (ref 4–16)
AST SERPL-CCNC: 15 IU/L (ref 15–37)
BASOPHILS ABSOLUTE: 0.1 K/CU MM
BASOPHILS RELATIVE PERCENT: 1 % (ref 0–1)
BILIRUB SERPL-MCNC: 0.2 MG/DL (ref 0–1)
BUN BLDV-MCNC: 6 MG/DL (ref 6–23)
CALCIUM SERPL-MCNC: 8.8 MG/DL (ref 8.3–10.6)
CHLORIDE BLD-SCNC: 96 MMOL/L (ref 99–110)
CO2: 27 MMOL/L (ref 21–32)
CREAT SERPL-MCNC: 0.5 MG/DL (ref 0.6–1.1)
DIFFERENTIAL TYPE: ABNORMAL
EOSINOPHILS ABSOLUTE: 0.2 K/CU MM
EOSINOPHILS RELATIVE PERCENT: 3 % (ref 0–3)
GFR AFRICAN AMERICAN: >60 ML/MIN/1.73M2
GFR NON-AFRICAN AMERICAN: >60 ML/MIN/1.73M2
GLUCOSE BLD-MCNC: 94 MG/DL (ref 70–99)
HCT VFR BLD CALC: 36.9 % (ref 37–47)
HEMOGLOBIN: 11.7 GM/DL (ref 12.5–16)
LYMPHOCYTES ABSOLUTE: 1.3 K/CU MM
LYMPHOCYTES RELATIVE PERCENT: 16 % (ref 24–44)
MCH RBC QN AUTO: 28 PG (ref 27–31)
MCHC RBC AUTO-ENTMCNC: 31.7 % (ref 32–36)
MCV RBC AUTO: 88.3 FL (ref 78–100)
MONOCYTES ABSOLUTE: 1 K/CU MM
MONOCYTES RELATIVE PERCENT: 12 % (ref 0–4)
PDW BLD-RTO: 19.4 % (ref 11.7–14.9)
PLATELET # BLD: 291 K/CU MM (ref 140–440)
PMV BLD AUTO: 10 FL (ref 7.5–11.1)
POTASSIUM SERPL-SCNC: 4.3 MMOL/L (ref 3.5–5.1)
RBC # BLD: 4.18 M/CU MM (ref 4.2–5.4)
SEGMENTED NEUTROPHILS ABSOLUTE COUNT: 5.5 K/CU MM
SEGMENTED NEUTROPHILS RELATIVE PERCENT: 68 % (ref 36–66)
SODIUM BLD-SCNC: 133 MMOL/L (ref 135–145)
TOTAL PROTEIN: 5.9 GM/DL (ref 6.4–8.2)
WBC # BLD: 8.1 K/CU MM (ref 4–10.5)

## 2020-01-31 PROCEDURE — 36415 COLL VENOUS BLD VENIPUNCTURE: CPT

## 2020-01-31 PROCEDURE — 80053 COMPREHEN METABOLIC PANEL: CPT

## 2020-01-31 PROCEDURE — 85025 COMPLETE CBC W/AUTO DIFF WBC: CPT

## 2020-01-31 PROCEDURE — 36591 DRAW BLOOD OFF VENOUS DEVICE: CPT

## 2020-02-03 ENCOUNTER — HOSPITAL ENCOUNTER (OUTPATIENT)
Dept: INFUSION THERAPY | Age: 65
Discharge: HOME | End: 2020-02-03
Payer: COMMERCIAL

## 2020-02-03 LAB — PROTEIN UA: NEGATIVE MG/DL

## 2020-02-03 PROCEDURE — 96375 TX/PRO/DX INJ NEW DRUG ADDON: CPT

## 2020-02-03 PROCEDURE — 96417 CHEMO IV INFUS EACH ADDL SEQ: CPT

## 2020-02-03 PROCEDURE — 96367 TX/PROPH/DG ADDL SEQ IV INF: CPT

## 2020-02-03 PROCEDURE — 96413 CHEMO IV INFUSION 1 HR: CPT

## 2020-02-03 PROCEDURE — 2580000003 HC RX 258: Performed by: INTERNAL MEDICINE

## 2020-02-03 PROCEDURE — 81003 URINALYSIS AUTO W/O SCOPE: CPT

## 2020-02-03 PROCEDURE — 6360000002 HC RX W HCPCS

## 2020-02-03 PROCEDURE — 6360000002 HC RX W HCPCS: Performed by: INTERNAL MEDICINE

## 2020-02-03 RX ORDER — DEXAMETHASONE SODIUM PHOSPHATE 4 MG/ML
INJECTION, SOLUTION INTRA-ARTICULAR; INTRALESIONAL; INTRAMUSCULAR; INTRAVENOUS; SOFT TISSUE
Status: DISPENSED
Start: 2020-02-03 | End: 2020-02-03

## 2020-02-03 RX ORDER — DIPHENHYDRAMINE HYDROCHLORIDE 50 MG/ML
INJECTION INTRAMUSCULAR; INTRAVENOUS
Status: DISCONTINUED
Start: 2020-02-03 | End: 2020-02-04 | Stop reason: HOSPADM

## 2020-02-03 RX ORDER — ONDANSETRON 2 MG/ML
INJECTION INTRAMUSCULAR; INTRAVENOUS
Status: DISPENSED
Start: 2020-02-03 | End: 2020-02-03

## 2020-02-03 RX ORDER — ONDANSETRON 2 MG/ML
8 INJECTION INTRAMUSCULAR; INTRAVENOUS ONCE
Status: DISCONTINUED | OUTPATIENT
Start: 2020-02-03 | End: 2020-02-03 | Stop reason: ALTCHOICE

## 2020-02-03 RX ORDER — DIPHENHYDRAMINE HYDROCHLORIDE 50 MG/ML
25 INJECTION INTRAMUSCULAR; INTRAVENOUS ONCE
Status: DISCONTINUED | OUTPATIENT
Start: 2020-02-03 | End: 2020-02-03 | Stop reason: ALTCHOICE

## 2020-02-03 RX ORDER — DEXAMETHASONE SODIUM PHOSPHATE 10 MG/ML
6 INJECTION, SOLUTION INTRAMUSCULAR; INTRAVENOUS ONCE
Status: DISCONTINUED | OUTPATIENT
Start: 2020-02-03 | End: 2020-02-03 | Stop reason: ALTCHOICE

## 2020-02-03 RX ORDER — DIPHENHYDRAMINE HYDROCHLORIDE 50 MG/ML
25 INJECTION INTRAMUSCULAR; INTRAVENOUS ONCE
Status: DISCONTINUED | OUTPATIENT
Start: 2020-02-03 | End: 2020-02-04 | Stop reason: HOSPADM

## 2020-02-21 ENCOUNTER — HOSPITAL ENCOUNTER (OUTPATIENT)
Dept: INFUSION THERAPY | Age: 65
Discharge: HOME | End: 2020-02-21
Payer: COMMERCIAL

## 2020-02-21 LAB
ALBUMIN SERPL-MCNC: 3.5 GM/DL (ref 3.4–5)
ALP BLD-CCNC: 96 IU/L (ref 40–128)
ALT SERPL-CCNC: 15 U/L (ref 10–40)
ANION GAP SERPL CALCULATED.3IONS-SCNC: 12 MMOL/L (ref 4–16)
AST SERPL-CCNC: 20 IU/L (ref 15–37)
BASOPHILS ABSOLUTE: 0 K/CU MM
BASOPHILS RELATIVE PERCENT: 0.6 % (ref 0–1)
BILIRUB SERPL-MCNC: 0.2 MG/DL (ref 0–1)
BUN BLDV-MCNC: 7 MG/DL (ref 6–23)
CALCIUM SERPL-MCNC: 9.1 MG/DL (ref 8.3–10.6)
CHLORIDE BLD-SCNC: 98 MMOL/L (ref 99–110)
CO2: 27 MMOL/L (ref 21–32)
CREAT SERPL-MCNC: 0.6 MG/DL (ref 0.6–1.1)
DIFFERENTIAL TYPE: ABNORMAL
EOSINOPHILS ABSOLUTE: 0 K/CU MM
EOSINOPHILS RELATIVE PERCENT: 0.3 % (ref 0–3)
GFR AFRICAN AMERICAN: >60 ML/MIN/1.73M2
GFR NON-AFRICAN AMERICAN: >60 ML/MIN/1.73M2
GLUCOSE BLD-MCNC: 90 MG/DL (ref 70–99)
HCT VFR BLD CALC: 36.3 % (ref 37–47)
HEMOGLOBIN: 11.3 GM/DL (ref 12.5–16)
LYMPHOCYTES ABSOLUTE: 1.2 K/CU MM
LYMPHOCYTES RELATIVE PERCENT: 17.2 % (ref 24–44)
MCH RBC QN AUTO: 27.7 PG (ref 27–31)
MCHC RBC AUTO-ENTMCNC: 31.1 % (ref 32–36)
MCV RBC AUTO: 89 FL (ref 78–100)
MONOCYTES ABSOLUTE: 1 K/CU MM
MONOCYTES RELATIVE PERCENT: 14.1 % (ref 0–4)
PDW BLD-RTO: 19.6 % (ref 11.7–14.9)
PLATELET # BLD: 231 K/CU MM (ref 140–440)
PMV BLD AUTO: 9.9 FL (ref 7.5–11.1)
POTASSIUM SERPL-SCNC: 4 MMOL/L (ref 3.5–5.1)
RBC # BLD: 4.08 M/CU MM (ref 4.2–5.4)
SEGMENTED NEUTROPHILS ABSOLUTE COUNT: 4.8 K/CU MM
SEGMENTED NEUTROPHILS RELATIVE PERCENT: 67.8 % (ref 36–66)
SODIUM BLD-SCNC: 137 MMOL/L (ref 135–145)
TOTAL PROTEIN: 5.9 GM/DL (ref 6.4–8.2)
WBC # BLD: 7.1 K/CU MM (ref 4–10.5)

## 2020-02-21 PROCEDURE — 85025 COMPLETE CBC W/AUTO DIFF WBC: CPT

## 2020-02-21 PROCEDURE — 36415 COLL VENOUS BLD VENIPUNCTURE: CPT

## 2020-02-21 PROCEDURE — 6360000002 HC RX W HCPCS

## 2020-02-21 PROCEDURE — 80053 COMPREHEN METABOLIC PANEL: CPT

## 2020-02-21 RX ORDER — HEPARIN SODIUM (PORCINE) LOCK FLUSH IV SOLN 100 UNIT/ML 100 UNIT/ML
SOLUTION INTRAVENOUS
Status: DISCONTINUED
Start: 2020-02-21 | End: 2020-02-22 | Stop reason: HOSPADM

## 2020-02-24 ENCOUNTER — HOSPITAL ENCOUNTER (OUTPATIENT)
Dept: INFUSION THERAPY | Age: 65
Discharge: HOME | End: 2020-02-24
Payer: COMMERCIAL

## 2020-02-24 LAB — PROTEIN UA: NEGATIVE MG/DL

## 2020-02-24 PROCEDURE — 96417 CHEMO IV INFUS EACH ADDL SEQ: CPT

## 2020-02-24 PROCEDURE — 6360000002 HC RX W HCPCS: Performed by: INTERNAL MEDICINE

## 2020-02-24 PROCEDURE — 2580000003 HC RX 258: Performed by: INTERNAL MEDICINE

## 2020-02-24 PROCEDURE — 6360000002 HC RX W HCPCS

## 2020-02-24 PROCEDURE — 96413 CHEMO IV INFUSION 1 HR: CPT

## 2020-02-24 PROCEDURE — 96375 TX/PRO/DX INJ NEW DRUG ADDON: CPT

## 2020-02-24 PROCEDURE — 81003 URINALYSIS AUTO W/O SCOPE: CPT

## 2020-02-24 RX ORDER — DEXAMETHASONE SODIUM PHOSPHATE 4 MG/ML
INJECTION, SOLUTION INTRA-ARTICULAR; INTRALESIONAL; INTRAMUSCULAR; INTRAVENOUS; SOFT TISSUE
Status: DISCONTINUED
Start: 2020-02-24 | End: 2020-02-25 | Stop reason: HOSPADM

## 2020-02-24 RX ORDER — ONDANSETRON 2 MG/ML
INJECTION INTRAMUSCULAR; INTRAVENOUS
Status: DISCONTINUED
Start: 2020-02-24 | End: 2020-02-25 | Stop reason: HOSPADM

## 2020-02-24 RX ORDER — ONDANSETRON 2 MG/ML
8 INJECTION INTRAMUSCULAR; INTRAVENOUS ONCE
Status: DISCONTINUED | OUTPATIENT
Start: 2020-02-24 | End: 2020-02-25 | Stop reason: HOSPADM

## 2020-02-24 RX ORDER — DIPHENHYDRAMINE HYDROCHLORIDE 50 MG/ML
INJECTION INTRAMUSCULAR; INTRAVENOUS
Status: DISCONTINUED
Start: 2020-02-24 | End: 2020-02-25 | Stop reason: HOSPADM

## 2020-02-24 RX ORDER — DIPHENHYDRAMINE HYDROCHLORIDE 50 MG/ML
25 INJECTION INTRAMUSCULAR; INTRAVENOUS ONCE
Status: DISCONTINUED | OUTPATIENT
Start: 2020-02-24 | End: 2020-02-25 | Stop reason: HOSPADM

## 2020-02-24 RX ORDER — HEPARIN SODIUM (PORCINE) LOCK FLUSH IV SOLN 100 UNIT/ML 100 UNIT/ML
SOLUTION INTRAVENOUS
Status: DISPENSED
Start: 2020-02-24 | End: 2020-02-24

## 2020-02-24 RX ORDER — DEXAMETHASONE SODIUM PHOSPHATE 10 MG/ML
6 INJECTION, SOLUTION INTRAMUSCULAR; INTRAVENOUS ONCE
Status: DISCONTINUED | OUTPATIENT
Start: 2020-02-24 | End: 2020-02-25 | Stop reason: HOSPADM

## 2020-03-09 ENCOUNTER — HOSPITAL ENCOUNTER (OUTPATIENT)
Dept: CT IMAGING | Age: 65
Discharge: HOME | End: 2020-03-09
Payer: COMMERCIAL

## 2020-03-09 PROCEDURE — 74177 CT ABD & PELVIS W/CONTRAST: CPT

## 2020-03-09 PROCEDURE — 70491 CT SOFT TISSUE NECK W/DYE: CPT

## 2020-03-09 PROCEDURE — 71260 CT THORAX DX C+: CPT

## 2020-03-09 PROCEDURE — 6360000004 HC RX CONTRAST MEDICATION: Performed by: INTERNAL MEDICINE

## 2020-03-09 RX ADMIN — IOPAMIDOL 75 ML: 755 INJECTION, SOLUTION INTRAVENOUS at 11:19

## 2020-03-09 RX ADMIN — IOHEXOL 50 ML: 240 INJECTION, SOLUTION INTRATHECAL; INTRAVASCULAR; INTRAVENOUS; ORAL at 11:19

## 2020-03-13 ENCOUNTER — HOSPITAL ENCOUNTER (OUTPATIENT)
Dept: INFUSION THERAPY | Age: 65
Discharge: HOME | End: 2020-03-13
Payer: COMMERCIAL

## 2020-03-13 LAB
ALBUMIN SERPL-MCNC: 3.4 GM/DL (ref 3.4–5)
ALP BLD-CCNC: 99 IU/L (ref 40–128)
ALT SERPL-CCNC: 14 U/L (ref 10–40)
ANION GAP SERPL CALCULATED.3IONS-SCNC: 11 MMOL/L (ref 4–16)
AST SERPL-CCNC: 18 IU/L (ref 15–37)
BASOPHILS ABSOLUTE: 0.1 K/CU MM
BASOPHILS RELATIVE PERCENT: 0.8 % (ref 0–1)
BILIRUB SERPL-MCNC: 0.2 MG/DL (ref 0–1)
BUN BLDV-MCNC: 8 MG/DL (ref 6–23)
CALCIUM SERPL-MCNC: 8.6 MG/DL (ref 8.3–10.6)
CHLORIDE BLD-SCNC: 103 MMOL/L (ref 99–110)
CO2: 24 MMOL/L (ref 21–32)
CREAT SERPL-MCNC: 0.6 MG/DL (ref 0.6–1.1)
DIFFERENTIAL TYPE: ABNORMAL
EOSINOPHILS ABSOLUTE: 0 K/CU MM
EOSINOPHILS RELATIVE PERCENT: 0.5 % (ref 0–3)
FERRITIN: 104 NG/ML (ref 15–150)
FOLATE: >20 NG/ML (ref 3.1–17.5)
GFR AFRICAN AMERICAN: >60 ML/MIN/1.73M2
GFR NON-AFRICAN AMERICAN: >60 ML/MIN/1.73M2
GLUCOSE BLD-MCNC: 88 MG/DL (ref 70–99)
HCT VFR BLD CALC: 35.5 % (ref 37–47)
HEMOGLOBIN: 11.1 GM/DL (ref 12.5–16)
IRON: 45 UG/DL (ref 37–145)
LYMPHOCYTES ABSOLUTE: 1.1 K/CU MM
LYMPHOCYTES RELATIVE PERCENT: 17.5 % (ref 24–44)
MCH RBC QN AUTO: 27.6 PG (ref 27–31)
MCHC RBC AUTO-ENTMCNC: 31.3 % (ref 32–36)
MCV RBC AUTO: 88.3 FL (ref 78–100)
MONOCYTES ABSOLUTE: 0.9 K/CU MM
MONOCYTES RELATIVE PERCENT: 14.5 % (ref 0–4)
PCT TRANSFERRIN: 15 % (ref 10–44)
PDW BLD-RTO: 19.9 % (ref 11.7–14.9)
PLATELET # BLD: 255 K/CU MM (ref 140–440)
PMV BLD AUTO: 10 FL (ref 7.5–11.1)
POTASSIUM SERPL-SCNC: 4.4 MMOL/L (ref 3.5–5.1)
PROTEIN UA: NEGATIVE MG/DL
RBC # BLD: 4.02 M/CU MM (ref 4.2–5.4)
SEGMENTED NEUTROPHILS ABSOLUTE COUNT: 4.2 K/CU MM
SEGMENTED NEUTROPHILS RELATIVE PERCENT: 66.7 % (ref 36–66)
SODIUM BLD-SCNC: 138 MMOL/L (ref 135–145)
T4 FREE: 1.61 NG/DL (ref 0.9–1.8)
TOTAL IRON BINDING CAPACITY: 310 UG/DL (ref 250–450)
TOTAL PROTEIN: 5.8 GM/DL (ref 6.4–8.2)
TSH HIGH SENSITIVITY: 4.91 UIU/ML (ref 0.27–4.2)
UNSATURATED IRON BINDING CAPACITY: 265 UG/DL (ref 110–370)
VITAMIN B-12: 1852 PG/ML (ref 211–911)
WBC # BLD: 6.4 K/CU MM (ref 4–10.5)

## 2020-03-13 PROCEDURE — 82607 VITAMIN B-12: CPT

## 2020-03-13 PROCEDURE — 82728 ASSAY OF FERRITIN: CPT

## 2020-03-13 PROCEDURE — 85025 COMPLETE CBC W/AUTO DIFF WBC: CPT

## 2020-03-13 PROCEDURE — 82746 ASSAY OF FOLIC ACID SERUM: CPT

## 2020-03-13 PROCEDURE — 84439 ASSAY OF FREE THYROXINE: CPT

## 2020-03-13 PROCEDURE — 80053 COMPREHEN METABOLIC PANEL: CPT

## 2020-03-13 PROCEDURE — 83550 IRON BINDING TEST: CPT

## 2020-03-13 PROCEDURE — 6360000002 HC RX W HCPCS

## 2020-03-13 PROCEDURE — 84443 ASSAY THYROID STIM HORMONE: CPT

## 2020-03-13 PROCEDURE — 83540 ASSAY OF IRON: CPT

## 2020-03-13 PROCEDURE — 36415 COLL VENOUS BLD VENIPUNCTURE: CPT

## 2020-03-13 PROCEDURE — 81003 URINALYSIS AUTO W/O SCOPE: CPT

## 2020-03-13 RX ORDER — HEPARIN SODIUM (PORCINE) LOCK FLUSH IV SOLN 100 UNIT/ML 100 UNIT/ML
SOLUTION INTRAVENOUS
Status: DISPENSED
Start: 2020-03-13 | End: 2020-03-13

## 2020-03-16 ENCOUNTER — HOSPITAL ENCOUNTER (OUTPATIENT)
Dept: INFUSION THERAPY | Age: 65
Discharge: HOME | End: 2020-03-16
Payer: COMMERCIAL

## 2020-03-16 PROCEDURE — 96375 TX/PRO/DX INJ NEW DRUG ADDON: CPT

## 2020-03-16 PROCEDURE — 96417 CHEMO IV INFUS EACH ADDL SEQ: CPT

## 2020-03-16 PROCEDURE — 6360000002 HC RX W HCPCS: Performed by: INTERNAL MEDICINE

## 2020-03-16 PROCEDURE — 2580000003 HC RX 258: Performed by: INTERNAL MEDICINE

## 2020-03-16 PROCEDURE — 96413 CHEMO IV INFUSION 1 HR: CPT

## 2020-03-16 PROCEDURE — 6360000002 HC RX W HCPCS

## 2020-03-16 RX ORDER — DIPHENHYDRAMINE HYDROCHLORIDE 50 MG/ML
25 INJECTION INTRAMUSCULAR; INTRAVENOUS ONCE
Status: DISCONTINUED | OUTPATIENT
Start: 2020-03-16 | End: 2020-03-17 | Stop reason: HOSPADM

## 2020-03-16 RX ORDER — DEXAMETHASONE SODIUM PHOSPHATE 4 MG/ML
INJECTION, SOLUTION INTRA-ARTICULAR; INTRALESIONAL; INTRAMUSCULAR; INTRAVENOUS; SOFT TISSUE
Status: DISPENSED
Start: 2020-03-16 | End: 2020-03-16

## 2020-03-16 RX ORDER — DIPHENHYDRAMINE HYDROCHLORIDE 50 MG/ML
INJECTION INTRAMUSCULAR; INTRAVENOUS
Status: DISPENSED
Start: 2020-03-16 | End: 2020-03-16

## 2020-03-16 RX ORDER — DEXAMETHASONE SODIUM PHOSPHATE 10 MG/ML
6 INJECTION, SOLUTION INTRAMUSCULAR; INTRAVENOUS ONCE
Status: DISCONTINUED | OUTPATIENT
Start: 2020-03-16 | End: 2020-03-17 | Stop reason: HOSPADM

## 2020-03-16 RX ORDER — ONDANSETRON 2 MG/ML
8 INJECTION INTRAMUSCULAR; INTRAVENOUS ONCE
Status: DISCONTINUED | OUTPATIENT
Start: 2020-03-16 | End: 2020-03-17 | Stop reason: HOSPADM

## 2020-03-16 RX ORDER — HEPARIN SODIUM (PORCINE) LOCK FLUSH IV SOLN 100 UNIT/ML 100 UNIT/ML
SOLUTION INTRAVENOUS
Status: DISCONTINUED
Start: 2020-03-16 | End: 2020-03-17 | Stop reason: HOSPADM

## 2020-03-16 RX ORDER — ONDANSETRON 2 MG/ML
INJECTION INTRAMUSCULAR; INTRAVENOUS
Status: DISPENSED
Start: 2020-03-16 | End: 2020-03-16

## 2020-04-03 ENCOUNTER — HOSPITAL ENCOUNTER (OUTPATIENT)
Dept: INFUSION THERAPY | Age: 65
Discharge: HOME | End: 2020-04-03
Payer: COMMERCIAL

## 2020-04-03 LAB
ALBUMIN SERPL-MCNC: 3.5 GM/DL (ref 3.4–5)
ALP BLD-CCNC: 89 IU/L (ref 40–128)
ALT SERPL-CCNC: 26 U/L (ref 10–40)
ANION GAP SERPL CALCULATED.3IONS-SCNC: 10 MMOL/L (ref 4–16)
AST SERPL-CCNC: 27 IU/L (ref 15–37)
BASOPHILS ABSOLUTE: 0.1 K/CU MM
BASOPHILS RELATIVE PERCENT: 1.1 % (ref 0–1)
BILIRUB SERPL-MCNC: 0.2 MG/DL (ref 0–1)
BUN BLDV-MCNC: 7 MG/DL (ref 6–23)
CALCIUM SERPL-MCNC: 9 MG/DL (ref 8.3–10.6)
CHLORIDE BLD-SCNC: 101 MMOL/L (ref 99–110)
CO2: 28 MMOL/L (ref 21–32)
CREAT SERPL-MCNC: 0.6 MG/DL (ref 0.6–1.1)
DIFFERENTIAL TYPE: ABNORMAL
EOSINOPHILS ABSOLUTE: 0 K/CU MM
EOSINOPHILS RELATIVE PERCENT: 0.5 % (ref 0–3)
GFR AFRICAN AMERICAN: >60 ML/MIN/1.73M2
GFR NON-AFRICAN AMERICAN: >60 ML/MIN/1.73M2
GLUCOSE BLD-MCNC: 89 MG/DL (ref 70–99)
HCT VFR BLD CALC: 38.3 % (ref 37–47)
HEMOGLOBIN: 11.9 GM/DL (ref 12.5–16)
LYMPHOCYTES ABSOLUTE: 1.4 K/CU MM
LYMPHOCYTES RELATIVE PERCENT: 24.9 % (ref 24–44)
MCH RBC QN AUTO: 27.5 PG (ref 27–31)
MCHC RBC AUTO-ENTMCNC: 31.1 % (ref 32–36)
MCV RBC AUTO: 88.5 FL (ref 78–100)
MONOCYTES ABSOLUTE: 0.9 K/CU MM
MONOCYTES RELATIVE PERCENT: 17.2 % (ref 0–4)
PDW BLD-RTO: 20.3 % (ref 11.7–14.9)
PLATELET # BLD: 263 K/CU MM (ref 140–440)
PMV BLD AUTO: 9.8 FL (ref 7.5–11.1)
POTASSIUM SERPL-SCNC: 4.2 MMOL/L (ref 3.5–5.1)
PROTEIN UA: NEGATIVE MG/DL
RBC # BLD: 4.33 M/CU MM (ref 4.2–5.4)
SEGMENTED NEUTROPHILS ABSOLUTE COUNT: 3.1 K/CU MM
SEGMENTED NEUTROPHILS RELATIVE PERCENT: 56.3 % (ref 36–66)
SODIUM BLD-SCNC: 139 MMOL/L (ref 135–145)
TOTAL PROTEIN: 5.6 GM/DL (ref 6.4–8.2)
WBC # BLD: 5.5 K/CU MM (ref 4–10.5)

## 2020-04-03 PROCEDURE — 85025 COMPLETE CBC W/AUTO DIFF WBC: CPT

## 2020-04-03 PROCEDURE — 99211 OFF/OP EST MAY X REQ PHY/QHP: CPT

## 2020-04-03 PROCEDURE — 81003 URINALYSIS AUTO W/O SCOPE: CPT

## 2020-04-03 PROCEDURE — 36591 DRAW BLOOD OFF VENOUS DEVICE: CPT

## 2020-04-03 PROCEDURE — 80053 COMPREHEN METABOLIC PANEL: CPT

## 2020-04-06 ENCOUNTER — HOSPITAL ENCOUNTER (OUTPATIENT)
Dept: INFUSION THERAPY | Age: 65
Discharge: HOME | End: 2020-04-06
Payer: COMMERCIAL

## 2020-04-06 PROCEDURE — 6360000002 HC RX W HCPCS: Performed by: INTERNAL MEDICINE

## 2020-04-06 PROCEDURE — 96413 CHEMO IV INFUSION 1 HR: CPT

## 2020-04-06 PROCEDURE — 96375 TX/PRO/DX INJ NEW DRUG ADDON: CPT

## 2020-04-06 PROCEDURE — 2580000003 HC RX 258: Performed by: INTERNAL MEDICINE

## 2020-04-06 PROCEDURE — 6360000002 HC RX W HCPCS

## 2020-04-06 PROCEDURE — 96417 CHEMO IV INFUS EACH ADDL SEQ: CPT

## 2020-04-06 RX ORDER — DIPHENHYDRAMINE HYDROCHLORIDE 50 MG/ML
INJECTION INTRAMUSCULAR; INTRAVENOUS
Status: DISPENSED
Start: 2020-04-06 | End: 2020-04-06

## 2020-04-06 RX ORDER — DEXAMETHASONE SODIUM PHOSPHATE 4 MG/ML
INJECTION, SOLUTION INTRA-ARTICULAR; INTRALESIONAL; INTRAMUSCULAR; INTRAVENOUS; SOFT TISSUE
Status: DISPENSED
Start: 2020-04-06 | End: 2020-04-06

## 2020-04-06 RX ORDER — ONDANSETRON 2 MG/ML
8 INJECTION INTRAMUSCULAR; INTRAVENOUS ONCE
Status: DISCONTINUED | OUTPATIENT
Start: 2020-04-06 | End: 2020-04-07 | Stop reason: HOSPADM

## 2020-04-06 RX ORDER — HEPARIN SODIUM (PORCINE) LOCK FLUSH IV SOLN 100 UNIT/ML 100 UNIT/ML
SOLUTION INTRAVENOUS
Status: DISPENSED
Start: 2020-04-06 | End: 2020-04-06

## 2020-04-06 RX ORDER — DIPHENHYDRAMINE HYDROCHLORIDE 50 MG/ML
25 INJECTION INTRAMUSCULAR; INTRAVENOUS ONCE
Status: DISCONTINUED | OUTPATIENT
Start: 2020-04-06 | End: 2020-04-07 | Stop reason: HOSPADM

## 2020-04-06 RX ORDER — DEXAMETHASONE SODIUM PHOSPHATE 10 MG/ML
6 INJECTION, SOLUTION INTRAMUSCULAR; INTRAVENOUS ONCE
Status: DISCONTINUED | OUTPATIENT
Start: 2020-04-06 | End: 2020-04-07 | Stop reason: HOSPADM

## 2020-04-06 RX ORDER — ONDANSETRON 2 MG/ML
INJECTION INTRAMUSCULAR; INTRAVENOUS
Status: DISPENSED
Start: 2020-04-06 | End: 2020-04-06

## 2020-04-21 ENCOUNTER — HOSPITAL ENCOUNTER (OUTPATIENT)
Dept: INFUSION THERAPY | Age: 65
Discharge: HOME | End: 2020-04-21
Payer: COMMERCIAL

## 2020-04-21 LAB
ALBUMIN SERPL-MCNC: 3.4 GM/DL (ref 3.4–5)
ALP BLD-CCNC: 82 IU/L (ref 40–128)
ALT SERPL-CCNC: 18 U/L (ref 10–40)
ANION GAP SERPL CALCULATED.3IONS-SCNC: 14 MMOL/L (ref 4–16)
AST SERPL-CCNC: 24 IU/L (ref 15–37)
BASOPHILS ABSOLUTE: 0.1 K/CU MM
BASOPHILS RELATIVE PERCENT: 1.8 % (ref 0–1)
BILIRUB SERPL-MCNC: 0.2 MG/DL (ref 0–1)
BUN BLDV-MCNC: 5 MG/DL (ref 6–23)
CALCIUM SERPL-MCNC: 8.9 MG/DL (ref 8.3–10.6)
CHLORIDE BLD-SCNC: 100 MMOL/L (ref 99–110)
CO2: 26 MMOL/L (ref 21–32)
CREAT SERPL-MCNC: 0.5 MG/DL (ref 0.6–1.1)
DIFFERENTIAL TYPE: ABNORMAL
EOSINOPHILS ABSOLUTE: 0 K/CU MM
EOSINOPHILS RELATIVE PERCENT: 0.5 % (ref 0–3)
GFR AFRICAN AMERICAN: >60 ML/MIN/1.73M2
GFR NON-AFRICAN AMERICAN: >60 ML/MIN/1.73M2
GLUCOSE BLD-MCNC: 81 MG/DL (ref 70–99)
HCT VFR BLD CALC: 38.6 % (ref 37–47)
HEMOGLOBIN: 12.2 GM/DL (ref 12.5–16)
LYMPHOCYTES ABSOLUTE: 1.4 K/CU MM
LYMPHOCYTES RELATIVE PERCENT: 37.4 % (ref 24–44)
MAGNESIUM: 2 MG/DL (ref 1.8–2.4)
MCH RBC QN AUTO: 27.7 PG (ref 27–31)
MCHC RBC AUTO-ENTMCNC: 31.6 % (ref 32–36)
MCV RBC AUTO: 87.5 FL (ref 78–100)
MONOCYTES ABSOLUTE: 1.2 K/CU MM
MONOCYTES RELATIVE PERCENT: 32.2 % (ref 0–4)
PDW BLD-RTO: 19.5 % (ref 11.7–14.9)
PLATELET # BLD: 237 K/CU MM (ref 140–440)
PMV BLD AUTO: 9.4 FL (ref 7.5–11.1)
POTASSIUM SERPL-SCNC: 4.2 MMOL/L (ref 3.5–5.1)
RBC # BLD: 4.41 M/CU MM (ref 4.2–5.4)
SEGMENTED NEUTROPHILS ABSOLUTE COUNT: 1.1 K/CU MM
SEGMENTED NEUTROPHILS RELATIVE PERCENT: 28.1 % (ref 36–66)
SODIUM BLD-SCNC: 140 MMOL/L (ref 135–145)
TOTAL PROTEIN: 5.6 GM/DL (ref 6.4–8.2)
WBC # BLD: 3.8 K/CU MM (ref 4–10.5)

## 2020-04-21 PROCEDURE — 6360000002 HC RX W HCPCS

## 2020-04-21 PROCEDURE — 36591 DRAW BLOOD OFF VENOUS DEVICE: CPT

## 2020-04-21 PROCEDURE — 85025 COMPLETE CBC W/AUTO DIFF WBC: CPT

## 2020-04-21 PROCEDURE — 36415 COLL VENOUS BLD VENIPUNCTURE: CPT

## 2020-04-21 PROCEDURE — 80053 COMPREHEN METABOLIC PANEL: CPT

## 2020-04-21 PROCEDURE — 83735 ASSAY OF MAGNESIUM: CPT

## 2020-04-21 RX ORDER — HEPARIN SODIUM (PORCINE) LOCK FLUSH IV SOLN 100 UNIT/ML 100 UNIT/ML
SOLUTION INTRAVENOUS
Status: DISPENSED
Start: 2020-04-21 | End: 2020-04-21

## 2020-04-27 ENCOUNTER — HOSPITAL ENCOUNTER (OUTPATIENT)
Dept: INFUSION THERAPY | Age: 65
Discharge: HOME | End: 2020-04-27
Payer: COMMERCIAL

## 2020-04-27 LAB
ALBUMIN SERPL-MCNC: 3.5 GM/DL (ref 3.4–5)
ALP BLD-CCNC: 79 IU/L (ref 40–129)
ALT SERPL-CCNC: 16 U/L (ref 10–40)
ANION GAP SERPL CALCULATED.3IONS-SCNC: 15 MMOL/L (ref 4–16)
AST SERPL-CCNC: 17 IU/L (ref 15–37)
BASOPHILS ABSOLUTE: 0 K/CU MM
BASOPHILS RELATIVE PERCENT: 0.1 % (ref 0–1)
BILIRUB SERPL-MCNC: 0.2 MG/DL (ref 0–1)
BUN BLDV-MCNC: 10 MG/DL (ref 6–23)
CALCIUM SERPL-MCNC: 9 MG/DL (ref 8.3–10.6)
CHLORIDE BLD-SCNC: 94 MMOL/L (ref 99–110)
CO2: 24 MMOL/L (ref 21–32)
CREAT SERPL-MCNC: 0.6 MG/DL (ref 0.6–1.1)
DIFFERENTIAL TYPE: ABNORMAL
EOSINOPHILS ABSOLUTE: 0 K/CU MM
EOSINOPHILS RELATIVE PERCENT: 0.1 % (ref 0–3)
GFR AFRICAN AMERICAN: >60 ML/MIN/1.73M2
GFR NON-AFRICAN AMERICAN: >60 ML/MIN/1.73M2
GLUCOSE BLD-MCNC: 111 MG/DL (ref 70–99)
HCT VFR BLD CALC: 38.5 % (ref 37–47)
HEMOGLOBIN: 12.6 GM/DL (ref 12.5–16)
LYMPHOCYTES ABSOLUTE: 0.9 K/CU MM
LYMPHOCYTES RELATIVE PERCENT: 6 % (ref 24–44)
MAGNESIUM: 1.7 MG/DL (ref 1.8–2.4)
MCH RBC QN AUTO: 27.9 PG (ref 27–31)
MCHC RBC AUTO-ENTMCNC: 32.7 % (ref 32–36)
MCV RBC AUTO: 85.2 FL (ref 78–100)
MONOCYTES ABSOLUTE: 2 K/CU MM
MONOCYTES RELATIVE PERCENT: 13.7 % (ref 0–4)
PDW BLD-RTO: 19.4 % (ref 11.7–14.9)
PLATELET # BLD: 267 K/CU MM (ref 140–440)
PMV BLD AUTO: 9.5 FL (ref 7.5–11.1)
POTASSIUM SERPL-SCNC: 4.3 MMOL/L (ref 3.5–5.1)
RBC # BLD: 4.52 M/CU MM (ref 4.2–5.4)
SEGMENTED NEUTROPHILS ABSOLUTE COUNT: 11.4 K/CU MM
SEGMENTED NEUTROPHILS RELATIVE PERCENT: 80.1 % (ref 36–66)
SODIUM BLD-SCNC: 133 MMOL/L (ref 135–145)
TOTAL PROTEIN: 5.8 GM/DL (ref 6.4–8.2)
WBC # BLD: 14.3 K/CU MM (ref 4–10.5)

## 2020-04-27 PROCEDURE — 2500000003 HC RX 250 WO HCPCS

## 2020-04-27 PROCEDURE — 36415 COLL VENOUS BLD VENIPUNCTURE: CPT

## 2020-04-27 PROCEDURE — 83735 ASSAY OF MAGNESIUM: CPT

## 2020-04-27 PROCEDURE — 96375 TX/PRO/DX INJ NEW DRUG ADDON: CPT

## 2020-04-27 PROCEDURE — 96361 HYDRATE IV INFUSION ADD-ON: CPT

## 2020-04-27 PROCEDURE — 80053 COMPREHEN METABOLIC PANEL: CPT

## 2020-04-27 PROCEDURE — 85025 COMPLETE CBC W/AUTO DIFF WBC: CPT

## 2020-04-27 PROCEDURE — 96374 THER/PROPH/DIAG INJ IV PUSH: CPT

## 2020-04-27 PROCEDURE — 6360000002 HC RX W HCPCS

## 2020-04-27 RX ORDER — DIPHENHYDRAMINE HYDROCHLORIDE 50 MG/ML
25 INJECTION INTRAMUSCULAR; INTRAVENOUS ONCE
Status: CANCELLED | OUTPATIENT
Start: 2020-04-27 | End: 2020-04-27

## 2020-04-27 RX ORDER — HEPARIN SODIUM (PORCINE) LOCK FLUSH IV SOLN 100 UNIT/ML 100 UNIT/ML
SOLUTION INTRAVENOUS
Status: DISPENSED
Start: 2020-04-27 | End: 2020-04-27

## 2020-04-27 RX ORDER — ONDANSETRON 2 MG/ML
8 INJECTION INTRAMUSCULAR; INTRAVENOUS ONCE
Status: CANCELLED | OUTPATIENT
Start: 2020-04-27 | End: 2020-04-27

## 2020-04-27 RX ORDER — DEXAMETHASONE SODIUM PHOSPHATE 4 MG/ML
INJECTION, SOLUTION INTRA-ARTICULAR; INTRALESIONAL; INTRAMUSCULAR; INTRAVENOUS; SOFT TISSUE
Status: DISPENSED
Start: 2020-04-27 | End: 2020-04-27

## 2020-04-27 RX ORDER — DEXAMETHASONE SODIUM PHOSPHATE 10 MG/ML
6 INJECTION, SOLUTION INTRAMUSCULAR; INTRAVENOUS ONCE
Status: CANCELLED | OUTPATIENT
Start: 2020-04-27 | End: 2020-04-27

## 2020-04-28 ENCOUNTER — HOSPITAL ENCOUNTER (OUTPATIENT)
Dept: INFUSION THERAPY | Age: 65
Discharge: HOME | End: 2020-04-28
Payer: COMMERCIAL

## 2020-04-28 LAB — PROTEIN UA: NEGATIVE MG/DL

## 2020-04-28 PROCEDURE — 81003 URINALYSIS AUTO W/O SCOPE: CPT

## 2020-04-29 ENCOUNTER — HOSPITAL ENCOUNTER (OUTPATIENT)
Dept: INFUSION THERAPY | Age: 65
Discharge: HOME | End: 2020-04-29
Payer: COMMERCIAL

## 2020-04-29 LAB — CLOSTRIDIUM DIFFICILE, PCR: ABNORMAL

## 2020-04-29 PROCEDURE — 96374 THER/PROPH/DIAG INJ IV PUSH: CPT

## 2020-04-29 PROCEDURE — 87046 STOOL CULTR AEROBIC BACT EA: CPT

## 2020-04-29 PROCEDURE — 87045 FECES CULTURE AEROBIC BACT: CPT

## 2020-04-29 PROCEDURE — 87077 CULTURE AEROBIC IDENTIFY: CPT

## 2020-04-29 PROCEDURE — 96361 HYDRATE IV INFUSION ADD-ON: CPT

## 2020-04-29 PROCEDURE — 2500000003 HC RX 250 WO HCPCS

## 2020-04-29 PROCEDURE — 96375 TX/PRO/DX INJ NEW DRUG ADDON: CPT

## 2020-04-29 PROCEDURE — 87324 CLOSTRIDIUM AG IA: CPT

## 2020-04-29 PROCEDURE — 6360000002 HC RX W HCPCS

## 2020-04-29 RX ORDER — HEPARIN SODIUM (PORCINE) LOCK FLUSH IV SOLN 100 UNIT/ML 100 UNIT/ML
SOLUTION INTRAVENOUS
Status: DISPENSED
Start: 2020-04-29 | End: 2020-04-29

## 2020-04-29 RX ORDER — DEXAMETHASONE SODIUM PHOSPHATE 4 MG/ML
INJECTION, SOLUTION INTRA-ARTICULAR; INTRALESIONAL; INTRAMUSCULAR; INTRAVENOUS; SOFT TISSUE
Status: DISPENSED
Start: 2020-04-29 | End: 2020-04-29

## 2020-05-01 ENCOUNTER — HOSPITAL ENCOUNTER (OUTPATIENT)
Age: 65
Setting detail: OBSERVATION
Discharge: HOME | End: 2020-05-02
Attending: EMERGENCY MEDICINE | Admitting: INTERNAL MEDICINE
Payer: COMMERCIAL

## 2020-05-01 ENCOUNTER — APPOINTMENT (OUTPATIENT)
Dept: CT IMAGING | Age: 65
End: 2020-05-01
Payer: COMMERCIAL

## 2020-05-01 ENCOUNTER — APPOINTMENT (OUTPATIENT)
Dept: GENERAL RADIOLOGY | Age: 65
End: 2020-05-01
Payer: COMMERCIAL

## 2020-05-01 PROBLEM — J90 PLEURAL EFFUSION: Status: ACTIVE | Noted: 2020-05-01

## 2020-05-01 LAB
ALBUMIN SERPL-MCNC: 3.1 GM/DL (ref 3.4–5)
ALP BLD-CCNC: 67 IU/L (ref 40–128)
ALT SERPL-CCNC: 17 U/L (ref 10–40)
ANION GAP SERPL CALCULATED.3IONS-SCNC: 14 MMOL/L (ref 4–16)
AST SERPL-CCNC: 18 IU/L (ref 15–37)
BACTERIA: NEGATIVE /HPF
BASOPHILS ABSOLUTE: 0 K/CU MM
BASOPHILS RELATIVE PERCENT: 0.5 % (ref 0–1)
BILIRUB SERPL-MCNC: 0.2 MG/DL (ref 0–1)
BILIRUBIN URINE: NEGATIVE MG/DL
BLOOD, URINE: NEGATIVE
BUN BLDV-MCNC: 7 MG/DL (ref 6–23)
CALCIUM SERPL-MCNC: 8.6 MG/DL (ref 8.3–10.6)
CHLORIDE BLD-SCNC: 99 MMOL/L (ref 99–110)
CLARITY: CLEAR
CO2: 22 MMOL/L (ref 21–32)
COLOR: YELLOW
CREAT SERPL-MCNC: 0.5 MG/DL (ref 0.6–1.1)
DIFFERENTIAL TYPE: ABNORMAL
EKG ATRIAL RATE: 84 BPM
EKG DIAGNOSIS: NORMAL
EKG P AXIS: 40 DEGREES
EKG P-R INTERVAL: 182 MS
EKG Q-T INTERVAL: 436 MS
EKG QRS DURATION: 76 MS
EKG QTC CALCULATION (BAZETT): 515 MS
EKG R AXIS: 15 DEGREES
EKG T AXIS: 23 DEGREES
EKG VENTRICULAR RATE: 84 BPM
EOSINOPHILS ABSOLUTE: 0.2 K/CU MM
EOSINOPHILS RELATIVE PERCENT: 2.9 % (ref 0–3)
GFR AFRICAN AMERICAN: >60 ML/MIN/1.73M2
GFR NON-AFRICAN AMERICAN: >60 ML/MIN/1.73M2
GLUCOSE BLD-MCNC: 98 MG/DL (ref 70–99)
GLUCOSE, URINE: NEGATIVE MG/DL
HCT VFR BLD CALC: 41.8 % (ref 37–47)
HEMOGLOBIN: 13.1 GM/DL (ref 12.5–16)
IMMATURE NEUTROPHIL %: 1 % (ref 0–0.43)
KETONES, URINE: ABNORMAL MG/DL
LEUKOCYTE ESTERASE, URINE: NEGATIVE
LIPASE: 98 IU/L (ref 13–60)
LYMPHOCYTES ABSOLUTE: 1.1 K/CU MM
LYMPHOCYTES RELATIVE PERCENT: 18.1 % (ref 24–44)
MAGNESIUM: 1.8 MG/DL (ref 1.8–2.4)
MCH RBC QN AUTO: 27.9 PG (ref 27–31)
MCHC RBC AUTO-ENTMCNC: 31.3 % (ref 32–36)
MCV RBC AUTO: 89.1 FL (ref 78–100)
MONOCYTES ABSOLUTE: 1.2 K/CU MM
MONOCYTES RELATIVE PERCENT: 18.7 % (ref 0–4)
NITRITE URINE, QUANTITATIVE: NEGATIVE
NUCLEATED RBC %: 0 %
PDW BLD-RTO: 19 % (ref 11.7–14.9)
PH, URINE: 7 (ref 5–8)
PLATELET # BLD: 241 K/CU MM (ref 140–440)
PMV BLD AUTO: 9.3 FL (ref 7.5–11.1)
POTASSIUM SERPL-SCNC: 3.5 MMOL/L (ref 3.5–5.1)
PRO-BNP: 400.5 PG/ML
PROTEIN UA: NEGATIVE MG/DL
RBC # BLD: 4.69 M/CU MM (ref 4.2–5.4)
RBC URINE: <1 /HPF (ref 0–6)
SEGMENTED NEUTROPHILS ABSOLUTE COUNT: 3.7 K/CU MM
SEGMENTED NEUTROPHILS RELATIVE PERCENT: 58.8 % (ref 36–66)
SODIUM BLD-SCNC: 135 MMOL/L (ref 135–145)
SPECIFIC GRAVITY UA: 1 (ref 1–1.03)
SQUAMOUS EPITHELIAL: 1 /HPF
TOTAL IMMATURE NEUTOROPHIL: 0.06 K/CU MM
TOTAL NUCLEATED RBC: 0 K/CU MM
TOTAL PROTEIN: 5.1 GM/DL (ref 6.4–8.2)
TRANSITIONAL EPITHELIAL: <1 /HPF
TRICHOMONAS: ABNORMAL /HPF
TROPONIN T: <0.01 NG/ML
UROBILINOGEN, URINE: NORMAL MG/DL (ref 0.2–1)
WBC # BLD: 6.3 K/CU MM (ref 4–10.5)
WBC UA: <1 /HPF (ref 0–5)

## 2020-05-01 PROCEDURE — 80053 COMPREHEN METABOLIC PANEL: CPT

## 2020-05-01 PROCEDURE — 85025 COMPLETE CBC W/AUTO DIFF WBC: CPT

## 2020-05-01 PROCEDURE — 94640 AIRWAY INHALATION TREATMENT: CPT

## 2020-05-01 PROCEDURE — 83735 ASSAY OF MAGNESIUM: CPT

## 2020-05-01 PROCEDURE — 94761 N-INVAS EAR/PLS OXIMETRY MLT: CPT

## 2020-05-01 PROCEDURE — 99285 EMERGENCY DEPT VISIT HI MDM: CPT

## 2020-05-01 PROCEDURE — 93005 ELECTROCARDIOGRAM TRACING: CPT | Performed by: EMERGENCY MEDICINE

## 2020-05-01 PROCEDURE — 96374 THER/PROPH/DIAG INJ IV PUSH: CPT

## 2020-05-01 PROCEDURE — 81001 URINALYSIS AUTO W/SCOPE: CPT

## 2020-05-01 PROCEDURE — 6360000002 HC RX W HCPCS: Performed by: NURSE PRACTITIONER

## 2020-05-01 PROCEDURE — 6370000000 HC RX 637 (ALT 250 FOR IP): Performed by: NURSE PRACTITIONER

## 2020-05-01 PROCEDURE — 83690 ASSAY OF LIPASE: CPT

## 2020-05-01 PROCEDURE — 6360000002 HC RX W HCPCS: Performed by: EMERGENCY MEDICINE

## 2020-05-01 PROCEDURE — G0378 HOSPITAL OBSERVATION PER HR: HCPCS

## 2020-05-01 PROCEDURE — 83880 ASSAY OF NATRIURETIC PEPTIDE: CPT

## 2020-05-01 PROCEDURE — 96372 THER/PROPH/DIAG INJ SC/IM: CPT

## 2020-05-01 PROCEDURE — 2580000003 HC RX 258: Performed by: NURSE PRACTITIONER

## 2020-05-01 PROCEDURE — 6360000004 HC RX CONTRAST MEDICATION: Performed by: EMERGENCY MEDICINE

## 2020-05-01 PROCEDURE — 71045 X-RAY EXAM CHEST 1 VIEW: CPT

## 2020-05-01 PROCEDURE — 93010 ELECTROCARDIOGRAM REPORT: CPT | Performed by: INTERNAL MEDICINE

## 2020-05-01 PROCEDURE — 84484 ASSAY OF TROPONIN QUANT: CPT

## 2020-05-01 PROCEDURE — 71275 CT ANGIOGRAPHY CHEST: CPT

## 2020-05-01 PROCEDURE — 2580000003 HC RX 258: Performed by: EMERGENCY MEDICINE

## 2020-05-01 RX ORDER — SODIUM CHLORIDE 0.9 % (FLUSH) 0.9 %
10 SYRINGE (ML) INJECTION PRN
Status: DISCONTINUED | OUTPATIENT
Start: 2020-05-01 | End: 2020-05-02 | Stop reason: HOSPADM

## 2020-05-01 RX ORDER — LORAZEPAM 0.5 MG/1
0.5 TABLET ORAL EVERY 6 HOURS PRN
Status: DISCONTINUED | OUTPATIENT
Start: 2020-05-01 | End: 2020-05-02 | Stop reason: HOSPADM

## 2020-05-01 RX ORDER — IPRATROPIUM BROMIDE AND ALBUTEROL SULFATE 2.5; .5 MG/3ML; MG/3ML
3 SOLUTION RESPIRATORY (INHALATION)
Status: DISCONTINUED | OUTPATIENT
Start: 2020-05-01 | End: 2020-05-02 | Stop reason: HOSPADM

## 2020-05-01 RX ORDER — OXYCODONE HYDROCHLORIDE AND ACETAMINOPHEN 5; 325 MG/1; MG/1
1 TABLET ORAL EVERY 4 HOURS PRN
Status: CANCELLED | OUTPATIENT
Start: 2020-05-01

## 2020-05-01 RX ORDER — GUAIFENESIN 600 MG/1
600 TABLET, EXTENDED RELEASE ORAL 2 TIMES DAILY
Status: CANCELLED | OUTPATIENT
Start: 2020-05-01

## 2020-05-01 RX ORDER — ONDANSETRON 2 MG/ML
4 INJECTION INTRAMUSCULAR; INTRAVENOUS EVERY 6 HOURS PRN
Status: DISCONTINUED | OUTPATIENT
Start: 2020-05-01 | End: 2020-05-02 | Stop reason: HOSPADM

## 2020-05-01 RX ORDER — FUROSEMIDE 10 MG/ML
20 INJECTION INTRAMUSCULAR; INTRAVENOUS DAILY
Status: DISCONTINUED | OUTPATIENT
Start: 2020-05-01 | End: 2020-05-02 | Stop reason: HOSPADM

## 2020-05-01 RX ORDER — PROMETHAZINE HYDROCHLORIDE 12.5 MG/1
12.5 TABLET ORAL EVERY 6 HOURS PRN
Status: DISCONTINUED | OUTPATIENT
Start: 2020-05-01 | End: 2020-05-02 | Stop reason: HOSPADM

## 2020-05-01 RX ORDER — POLYETHYLENE GLYCOL 3350 17 G/17G
17 POWDER, FOR SOLUTION ORAL DAILY PRN
Status: DISCONTINUED | OUTPATIENT
Start: 2020-05-01 | End: 2020-05-02 | Stop reason: HOSPADM

## 2020-05-01 RX ORDER — FUROSEMIDE 10 MG/ML
20 INJECTION INTRAMUSCULAR; INTRAVENOUS ONCE
Status: COMPLETED | OUTPATIENT
Start: 2020-05-01 | End: 2020-05-01

## 2020-05-01 RX ORDER — SODIUM CHLORIDE 0.9 % (FLUSH) 0.9 %
10 SYRINGE (ML) INJECTION EVERY 12 HOURS SCHEDULED
Status: DISCONTINUED | OUTPATIENT
Start: 2020-05-01 | End: 2020-05-02 | Stop reason: HOSPADM

## 2020-05-01 RX ORDER — PROCHLORPERAZINE MALEATE 5 MG/1
10 TABLET ORAL PRN
Status: CANCELLED | OUTPATIENT
Start: 2020-05-01

## 2020-05-01 RX ORDER — ACETAMINOPHEN 650 MG/1
650 SUPPOSITORY RECTAL EVERY 6 HOURS PRN
Status: DISCONTINUED | OUTPATIENT
Start: 2020-05-01 | End: 2020-05-02 | Stop reason: HOSPADM

## 2020-05-01 RX ORDER — ASPIRIN 325 MG
325 TABLET ORAL DAILY
Status: CANCELLED | OUTPATIENT
Start: 2020-05-01

## 2020-05-01 RX ORDER — ACETAMINOPHEN 325 MG/1
650 TABLET ORAL EVERY 6 HOURS PRN
Status: DISCONTINUED | OUTPATIENT
Start: 2020-05-01 | End: 2020-05-02 | Stop reason: HOSPADM

## 2020-05-01 RX ADMIN — FUROSEMIDE 20 MG: 10 INJECTION, SOLUTION INTRAVENOUS at 15:07

## 2020-05-01 RX ADMIN — SODIUM CHLORIDE, PRESERVATIVE FREE 10 ML: 5 INJECTION INTRAVENOUS at 20:27

## 2020-05-01 RX ADMIN — SODIUM CHLORIDE, PRESERVATIVE FREE 10 ML: 5 INJECTION INTRAVENOUS at 14:07

## 2020-05-01 RX ADMIN — IPRATROPIUM BROMIDE AND ALBUTEROL SULFATE 3 ML: .5; 3 SOLUTION RESPIRATORY (INHALATION) at 23:55

## 2020-05-01 RX ADMIN — ENOXAPARIN SODIUM 40 MG: 40 INJECTION SUBCUTANEOUS at 20:24

## 2020-05-01 RX ADMIN — IOPAMIDOL 80 ML: 755 INJECTION, SOLUTION INTRAVENOUS at 14:07

## 2020-05-01 RX ADMIN — VANCOMYCIN HYDROCHLORIDE 125 MG: KIT at 20:25

## 2020-05-01 ASSESSMENT — PAIN SCALES - GENERAL
PAINLEVEL_OUTOF10: 0
PAINLEVEL_OUTOF10: 0

## 2020-05-01 ASSESSMENT — ENCOUNTER SYMPTOMS
SORE THROAT: 0
VOMITING: 0
NAUSEA: 0
COUGH: 0
EYE REDNESS: 0
SHORTNESS OF BREATH: 1
ABDOMINAL PAIN: 0
CHEST TIGHTNESS: 1
RHINORRHEA: 0
BACK PAIN: 0

## 2020-05-01 NOTE — ED PROVIDER NOTES
Patient was signed out to me to follow-up on the results of her CT scan as well as for final disposition. She is a 81-year-old woman with lung cancer currently on chemotherapy who presents with some mild shortness of breath and was bilateral leg swelling. She states that she has received steroids recently from her chemo center and that she often gets swelling after steroids. She is currently on treatment for C. difficile colitis. She denies a history of known heart failure. She does not have a home oxygen requirement. In the emergency department, she has saturations in the mid 90s on room air. CT is obtained and does show right-sided pleural effusion. Does not show blood clot or other acute process. She will be given IV Lasix as well as admitted for diuresis. She is agreeable plan of care.     Katy Weldon  5/1/2020  2:47 PM       Katy Weldon MD  05/01/20 9918

## 2020-05-01 NOTE — PROGRESS NOTES
Skin assessment done by this nurse and Crystal DIAZ. +3 pitting edma to BLE. External hemorrhoids noted. All other skin in tact. Bed in lowest position. Call light within reach.

## 2020-05-01 NOTE — ED NOTES
Patient up for transport     [address] [facility].  [facility]  05/01/20 7369

## 2020-05-01 NOTE — CARE COORDINATION
MARY ELLENW completed chart review. Pt here today for sob & Diarrhea and bilateral leg swelling. Pt currently taking chemo for metastatic lung cancer and has Partial right lung resection. Patient diagnosed with C-diff Wednesday which she is receiving treatment for. Basic labs were obtained and are unremarkable and pt noted to have new right Pleural effusion. Pt given IV Lasix and requiring admision for diuresis. Chart reflects pt lives at home with spouse. Pt does not drive. Pt spouse provides the transportation. Pt has PCP. Pt has no DME or HC in the home. Pt is in contact isolation and room ready.

## 2020-05-01 NOTE — H&P
disease. Hx of Hypothyroidism, GERD, HDL, COPD, and lung cancer. Review of Systems   Ten point ROS reviewed and negative, unless as noted below. GENERAL:  Denies fever, chills, night sweats, or changes in weight. EYES:  Denies recent visual changes. ENT:  Denies ear pain, hearing loss or tinnitus  RESP:  Denies any cough, dyspnea, or wheezing. CV:  + chest pain, palpitations, syncope, + edema. GI:  Denies any dysphagia, nausea, vomiting, abdominal pain, heartburn, changes in bowel habit, melena or rectal bleeding  MUSCULOSKELETAL:  Denies any joint swelling, joint pain, or loss of range of motion. NEURO:  Denies any headaches, tremors, dizziness, vertigo, memory loss, confusion, weakness, numbness or tingling. PSYCH:  Denies any sleeping problems, history of abuse, marital discord. HEME/LYMPHATIC/IMMUNO:  Denies , bruising, bleeding abnormalities   ENDO:  Denies any heat or cold intolerance, panemiaolyuria or polydipsia. Objective:   No intake or output data in the 24 hours ending 05/01/20 1540   Vitals:   Vitals:    05/01/20 1302   BP: (!) 150/75   Pulse:    Resp:    Temp:    SpO2: 94%     Physical Exam:   Gen:  awake, alert, cooperative, no apparent distress. Ill appearing  EYES:Lids and lashes normal, pupils equal, round ,extra ocular muscles intact, sclera clear, conjunctiva normal  ENT:  Normocephalic, oral pharynx with moist mucus membranes  NECK:  Supple, symmetrical, trachea midline, no adenopathy,  LUNGS:  Diminished bilaterally, no rales ronchi or wheezing noted. CARDIOVASCULAR:  regular rate and rhythm, normal S1 and S2,no murmur noted, peripheral pulses 2+, 2+ pitting edema  ABDOMEN: Normal BS, Non tender, non distended, no HSM noted. MUSCULOSKELETAL:  ROM of all extremities grossly wnl  NEUROLOGIC: AOx 3,  Cranial nerves II-XII are grossly intact. Motor is 5 out of 5 bilaterally. Sensory is intact, no lateralizing findings.    SKIN:  no bruising or bleeding, normal skin color, turgor, no redness, warmth, or swelling      Past Medical History:      Past Medical History:   Diagnosis Date    Cancer ([facility]     lung cancer (dx in 2017), gone to liver    GERD (gastroesophageal reflux disease)     History of external beam radiation therapy 2002 and 2017    right upper lobe (2002) and right lung/mediastinum (2017)    History of radiation therapy 2018    stereotactic brain - [facility]    Hypothyroidism      PSHX:  has a past surgical history that includes Cholecystectomy; Pilonidal cyst excision; lobectomy (2002); Lung biopsy (Right, 2002); Lung biopsy (2017); Tunneled venous port placement (Left); Bladder surgery; and Hysterectomy. Allergies: Allergies   Allergen Reactions    Amoxicillin-Pot Clavulanate Diarrhea    Prednisone & Diphenhydramine Other (See Comments)     All steroids. Patient can't walk, has nausea and diarrhea.  Sertraline Hcl Other (See Comments)    Codeine Nausea And Vomiting       FAM HX: Reviewed and noncontributory  Family history reviewed and is essentially negative unless as stated above.      Soc HX:   Social History     Socioeconomic History    Marital status:      Spouse name: Not on file    Number of children: Not on file    Years of education: Not on file    Highest education level: Not on file   Occupational History    Not on file   Social Needs    Financial resource strain: Not on file    Food insecurity     Worry: Not on file     Inability: Not on file    Transportation needs     Medical: Not on file     Non-medical: Not on file   Tobacco Use    Smoking status: Never Smoker    Smokeless tobacco: Never Used   Substance and Sexual Activity    Alcohol use: No    Drug use: No    Sexual activity: Not on file   Lifestyle    Physical activity     Days per week: Not on file     Minutes per session: Not on file    Stress: Not on file   Relationships    Social connections     Talks on phone: Not on file     Gets together: Not on file     Attends

## 2020-05-02 VITALS
HEART RATE: 88 BPM | OXYGEN SATURATION: 92 % | RESPIRATION RATE: 17 BRPM | WEIGHT: 156 LBS | TEMPERATURE: 97.6 F | SYSTOLIC BLOOD PRESSURE: 133 MMHG | DIASTOLIC BLOOD PRESSURE: 75 MMHG | HEIGHT: 63 IN | BODY MASS INDEX: 27.64 KG/M2

## 2020-05-02 LAB
ANION GAP SERPL CALCULATED.3IONS-SCNC: 11 MMOL/L (ref 4–16)
ANION GAP SERPL CALCULATED.3IONS-SCNC: 12 MMOL/L (ref 4–16)
BUN BLDV-MCNC: 10 MG/DL (ref 6–23)
BUN BLDV-MCNC: 8 MG/DL (ref 6–23)
CALCIUM SERPL-MCNC: 8.2 MG/DL (ref 8.3–10.6)
CALCIUM SERPL-MCNC: 8.6 MG/DL (ref 8.3–10.6)
CHLORIDE BLD-SCNC: 97 MMOL/L (ref 99–110)
CHLORIDE BLD-SCNC: 97 MMOL/L (ref 99–110)
CO2: 26 MMOL/L (ref 21–32)
CO2: 27 MMOL/L (ref 21–32)
CREAT SERPL-MCNC: 0.5 MG/DL (ref 0.6–1.1)
CREAT SERPL-MCNC: 0.6 MG/DL (ref 0.6–1.1)
CULTURE: NORMAL
GFR AFRICAN AMERICAN: >60 ML/MIN/1.73M2
GFR AFRICAN AMERICAN: >60 ML/MIN/1.73M2
GFR NON-AFRICAN AMERICAN: >60 ML/MIN/1.73M2
GFR NON-AFRICAN AMERICAN: >60 ML/MIN/1.73M2
GLUCOSE BLD-MCNC: 105 MG/DL (ref 70–99)
GLUCOSE BLD-MCNC: 143 MG/DL (ref 70–99)
Lab: NORMAL
MAGNESIUM: 1.8 MG/DL (ref 1.8–2.4)
POTASSIUM SERPL-SCNC: 3.2 MMOL/L (ref 3.5–5.1)
POTASSIUM SERPL-SCNC: 3.5 MMOL/L (ref 3.5–5.1)
SODIUM BLD-SCNC: 135 MMOL/L (ref 135–145)
SODIUM BLD-SCNC: 135 MMOL/L (ref 135–145)
SPECIMEN: NORMAL

## 2020-05-02 PROCEDURE — 2580000003 HC RX 258: Performed by: NURSE PRACTITIONER

## 2020-05-02 PROCEDURE — 6370000000 HC RX 637 (ALT 250 FOR IP): Performed by: INTERNAL MEDICINE

## 2020-05-02 PROCEDURE — 83735 ASSAY OF MAGNESIUM: CPT

## 2020-05-02 PROCEDURE — 6370000000 HC RX 637 (ALT 250 FOR IP): Performed by: NURSE PRACTITIONER

## 2020-05-02 PROCEDURE — G0378 HOSPITAL OBSERVATION PER HR: HCPCS

## 2020-05-02 PROCEDURE — 6360000002 HC RX W HCPCS: Performed by: INTERNAL MEDICINE

## 2020-05-02 PROCEDURE — 99245 OFF/OP CONSLTJ NEW/EST HI 55: CPT | Performed by: INTERNAL MEDICINE

## 2020-05-02 PROCEDURE — 94640 AIRWAY INHALATION TREATMENT: CPT

## 2020-05-02 PROCEDURE — 94761 N-INVAS EAR/PLS OXIMETRY MLT: CPT

## 2020-05-02 PROCEDURE — 80048 BASIC METABOLIC PNL TOTAL CA: CPT

## 2020-05-02 PROCEDURE — 6360000002 HC RX W HCPCS: Performed by: NURSE PRACTITIONER

## 2020-05-02 PROCEDURE — 96376 TX/PRO/DX INJ SAME DRUG ADON: CPT

## 2020-05-02 RX ORDER — FUROSEMIDE 20 MG/1
20 TABLET ORAL DAILY PRN
Qty: 30 TABLET | Refills: 1 | Status: SHIPPED | OUTPATIENT
Start: 2020-05-02

## 2020-05-02 RX ORDER — POTASSIUM CHLORIDE 20 MEQ/1
20 TABLET, EXTENDED RELEASE ORAL DAILY PRN
Qty: 30 TABLET | Refills: 1 | Status: SHIPPED | OUTPATIENT
Start: 2020-05-02

## 2020-05-02 RX ORDER — HEPARIN SODIUM (PORCINE) LOCK FLUSH IV SOLN 100 UNIT/ML 100 UNIT/ML
500 SOLUTION INTRAVENOUS ONCE
Status: COMPLETED | OUTPATIENT
Start: 2020-05-02 | End: 2020-05-02

## 2020-05-02 RX ORDER — LANOLIN ALCOHOL/MO/W.PET/CERES
800 CREAM (GRAM) TOPICAL ONCE
Status: DISCONTINUED | OUTPATIENT
Start: 2020-05-02 | End: 2020-05-02 | Stop reason: HOSPADM

## 2020-05-02 RX ADMIN — Medication 500 UNITS: at 17:58

## 2020-05-02 RX ADMIN — POTASSIUM BICARBONATE 40 MEQ: 782 TABLET, EFFERVESCENT ORAL at 11:21

## 2020-05-02 RX ADMIN — VANCOMYCIN HYDROCHLORIDE 125 MG: KIT at 01:45

## 2020-05-02 RX ADMIN — VANCOMYCIN HYDROCHLORIDE 125 MG: KIT at 11:21

## 2020-05-02 RX ADMIN — VANCOMYCIN HYDROCHLORIDE 125 MG: KIT at 06:42

## 2020-05-02 RX ADMIN — SODIUM CHLORIDE, PRESERVATIVE FREE 10 ML: 5 INJECTION INTRAVENOUS at 09:21

## 2020-05-02 RX ADMIN — IPRATROPIUM BROMIDE AND ALBUTEROL SULFATE 3 ML: .5; 3 SOLUTION RESPIRATORY (INHALATION) at 11:32

## 2020-05-02 RX ADMIN — IPRATROPIUM BROMIDE AND ALBUTEROL SULFATE 3 ML: .5; 3 SOLUTION RESPIRATORY (INHALATION) at 15:56

## 2020-05-02 RX ADMIN — IPRATROPIUM BROMIDE AND ALBUTEROL SULFATE 3 ML: .5; 3 SOLUTION RESPIRATORY (INHALATION) at 07:21

## 2020-05-02 RX ADMIN — FUROSEMIDE 20 MG: 10 INJECTION, SOLUTION INTRAVENOUS at 09:21

## 2020-05-02 RX ADMIN — LEVOTHYROXINE SODIUM 175 MCG: 150 TABLET ORAL at 09:46

## 2020-05-02 ASSESSMENT — PAIN SCALES - GENERAL: PAINLEVEL_OUTOF10: 0

## 2020-05-02 NOTE — DISCHARGE SUMMARY
secondary to partial right lung resection with unchanged appearance of post radiation and surgical changes. Since the prior examination there has been interval development of a small right pleural effusion with atelectatic changes noted within the right middle lobe and right lower lobe. The left lung demonstrates dependent atelectasis and scarring. The lung parenchyma is emphysematous. There is a very minimal posterior left pleural effusion posteriorly. Upper Abdomen: Hypodense liver lesion right hepatic lobe measures 16 mm. The multiple distal hypodense liver lesions are not as well delineated potentially as a result of technique. Soft Tissues/Bones: No acute bone or soft tissue abnormality. 1. No acute pulmonary artery embolism. 2. Partial right lung resection with expected post radiation treatment and surgical changes. 3. New right pleural effusion with right middle and right lower lobe atelectasis. Superimposed infection not excluded. 4. Limited assessment known hepatic metastasis. 5. Emphysema. 6. Small left pleural effusion with basilar atelectasis. Discharge Exam:  Gen:  awake, alert, cooperative, no apparent distress. Ill appearing  EYES:Lids and lashes normal, pupils equal, round ,extra ocular muscles intact, sclera clear, conjunctiva normal  ENT:  Normocephalic, oral pharynx with moist mucus membranes  NECK:  Supple, symmetrical, trachea midline, no adenopathy,  LUNGS:  Decreased bilaterally, no rales ronchi or wheezing noted. CARDIOVASCULAR:  regular rate and rhythm, normal S1 and S2,no murmur noted, 1+ pitting edema  ABDOMEN: Normal BS, Non tender, non distended, no HSM noted. MUSCULOSKELETAL:  ROM of all extremities grossly wnl  NEUROLOGIC: AOx 3,  Cranial nerves II-XII are grossly intact. Motor is 5 out of 5 bilaterally. Sensory is intact, no lateralizing findings.    SKIN:  no bruising or bleeding, normal skin color, turgor, no redness, warmth, or swelling       Disposition: home    Patient Instructions:     Discharge Medications:   Boubacar Lincoln   Home Medication Instructions PSP:[id number]    Printed on:05/02/20 1257   Medication Information                      Acidophilus Lactobacillus CAPS  Take by mouth             aspirin 325 MG tablet  Take by mouth             B Complex-Biotin-FA (B-COMPLEX PO)  Take 1 each by mouth nightly             benzonatate (TESSALON) 100 MG capsule  Take 200 mg by mouth 3 times daily as needed for Cough             Cholecalciferol 1000 units CHEW  Take 2,000 Units by mouth             Cinnamon 500 MG TABS  Take 1 tablet by mouth nightly             coenzyme Q10 60 MG CAPS  Take 60 mg by mouth nightly              dexamethasone (DECADRON) 4 MG tablet  Take 4 mg by mouth daily Indications: with chemo             furosemide (LASIX) 20 MG tablet  Take 1 tablet by mouth daily as needed (Edema)             Garlic 1263 MG CAPS  Take 1 capsule by mouth nightly             guaiFENesin (MUCINEX) 600 MG extended release tablet  Take 1 tablet by mouth 2 times daily             ipratropium-albuterol (DUONEB) 0.5-2.5 (3) MG/3ML SOLN nebulizer solution  Inhale 3 mLs into the lungs every 4 hours (while awake)             levothyroxine (SYNTHROID) 175 MCG tablet  Take 175 mcg by mouth              LORazepam (ATIVAN) 0.5 MG tablet  Take 0.5 mg by mouth every 6 hours as needed for Anxiety.              Multiple Vitamins-Minerals (HAIR/SKIN/NAILS) TABS  Take 1 tablet by mouth daily              NIACIN CR PO  Take 1 tablet by mouth nightly              ondansetron (ZOFRAN-ODT) 8 MG TBDP disintegrating tablet  Take 8 mg by mouth as needed for Nausea              oxyCODONE-acetaminophen (PERCOCET) 5-325 MG per tablet  Take 1 tablet by mouth every 4 hours as needed for Pain.             potassium chloride (KLOR-CON M) 20 MEQ extended release tablet  Take 1 tablet by mouth daily as needed (Please take pill along with Lasix)             prochlorperazine (COMPAZINE) 10 MG

## 2020-05-02 NOTE — PLAN OF CARE
Problem: Falls - Risk of:  Goal: Will remain free from falls  Description: Will remain free from falls  5/2/2020 0446 by Jennifer Stapleton RN  Outcome: Completed  5/2/2020 0443 by Jennifer Stapleton RN  Outcome: Ongoing  5/2/2020 0443 by Jennifer Stapleton RN  Outcome: Ongoing  Goal: Absence of physical injury  Description: Absence of physical injury  5/2/2020 0446 by Jennifer Stapleton RN  Outcome: Completed  5/2/2020 0443 by Jennifer Stapleton RN  Outcome: Ongoing  5/2/2020 0443 by Jennifer Stapleton RN  Outcome: Ongoing     Problem: Nausea/Vomiting:  Goal: Absence of nausea/vomiting  Description: Absence of nausea/vomiting  5/2/2020 0446 by Jennifer Stapleton RN  Outcome: Completed  5/2/2020 0443 by Jennifer Stapleton RN  Outcome: Ongoing  5/2/2020 0443 by Jennifer Stapleton RN  Outcome: Ongoing  Goal: Able to drink  Description: Able to drink  5/2/2020 0446 by Jennifer Stapleton RN  Outcome: Completed  5/2/2020 0443 by Jennifer Stapleton RN  Outcome: Ongoing  5/2/2020 0443 by Jennifer Stapleton RN  Outcome: Ongoing  Goal: Able to eat  Description: Able to eat  5/2/2020 0446 by Jennifer Stapleton RN  Outcome: Completed  5/2/2020 0443 by Jennifer Stapleton RN  Outcome: Ongoing  5/2/2020 0443 by Jennifer Stapleton RN  Outcome: Ongoing  Goal: Ability to achieve adequate nutritional intake will improve  Description: Ability to achieve adequate nutritional intake will improve  5/2/2020 0446 by Jennifer Stapleton RN  Outcome: Completed  5/2/2020 0443 by Jennifer Stapleton RN  Outcome: Ongoing  5/2/2020 0443 by Jennifer Stapleton RN  Outcome: Ongoing     Problem: Diarrhea:  Goal: Bowel elimination is within specified parameters  Description: Bowel elimination is within specified parameters  5/2/2020 0446 by Jennifer Stapleton RN  Outcome: Completed  5/2/2020 0443 by Jennifer Stapleton RN  Outcome: Ongoing  5/2/2020 0443 by Jennifer Stapleton RN  Outcome: Ongoing  Goal: Passage of soft, formed stool  Description: Passage of soft, formed stool  5/2/2020 0446

## 2020-05-02 NOTE — CONSULTS
ONCOLOGY HEMATOLOGY CARE ([facility])  CONSULTATION REPORT    REASON FOR CONSULT  To evaluate the patient with metastatic non small cell lung cancer. CHIEF COMPLAINT    Chief Complaint   Patient presents with    Diarrhea     HISTORY OF PRESENT ILLNESS   Primitivo Khanna is a 62 y.o. female with metastatic non small cell lung cancer, currently on third line chemotherapy with docetaxel and ramucirumab, recently diagnosed c diff colitis, presented to [facility] on 5/1/20 with SOB and bilateral leg swelling. She was started with lasix since admission with improvement in her SOB and leg swelling. I was called to evaluate her. She is a little better this morning and she likes to go home today. PAST MEDICAL HISTORY    Past Medical History:   Diagnosis Date    Cancer [facility])     lung cancer (dx in 2017), gone to liver    GERD (gastroesophageal reflux disease)     History of external beam radiation therapy 2002 and 2017    right upper lobe (2002) and right lung/mediastinum (2017)    History of radiation therapy 2018    stereotactic brain - [facility]    Hypothyroidism        SURGICAL HISTORY    Past Surgical History:   Procedure Laterality Date    BLADDER SURGERY      bladder lift    CHOLECYSTECTOMY      HYSTERECTOMY      partial - still has ovaries    LOBECTOMY  2002    right upper    LUNG BIOPSY Right 2002    LUNG BIOPSY  2017    EBUS with biopsy in 6/2017 and 8/2017    PILONIDAL CYST EXCISION      TUNNELED VENOUS PORT PLACEMENT Left        FAMILY HISTORY    No family history on file.     SOCIAL HISTORY    Social History     Socioeconomic History    Marital status:      Spouse name: Not on file    Number of children: Not on file    Years of education: Not on file    Highest education level: Not on file   Occupational History    Not on file   Social Needs    Financial resource strain: Not on file    Food insecurity     Worry: Not on file     Inability: Not on file    Transportation needs     Medical: an out patient after completion of antibiotics. Will plan to change her chemo to gemcitabine since she couldn't tolerate current chemo any more. If she will be released home soon, I will see her on 5/15/20. We will follow the patient. Thank you for allowing me to participate in the care of this very pleasant patient.

## 2020-05-02 NOTE — PLAN OF CARE
Problem: Fluid Volume:  Goal: Hemodynamic stability will improve  Description: Hemodynamic stability will improve  Outcome: Ongoing  Goal: Ability to maintain a balanced intake and output will improve  Description: Ability to maintain a balanced intake and output will improve  Outcome: Ongoing     Problem: Health Behavior:  Goal: Identification of resources available to assist in meeting health care needs will improve  Description: Identification of resources available to assist in meeting health care needs will improve  Outcome: Ongoing  Goal: Adoption of positive health habits will improve  Description: Adoption of positive health habits will improve  Outcome: Ongoing  Goal: Identification of resources available to assist in meeting health care needs will improve  Description: Identification of resources available to assist in meeting health care needs will improve  Outcome: Ongoing  Goal: Ability to verbalize follow-up procedures will improve  Description: Ability to verbalize follow-up procedures will improve  Outcome: Ongoing     Problem: Respiratory:  Goal: Respiratory status will improve  Description: Respiratory status will improve  Outcome: Ongoing     Problem:  Activity:  Goal: Ability to tolerate increased activity will improve  Description: Ability to tolerate increased activity will improve  Outcome: Ongoing  Goal: Ability to implement measures to reduce episodes of fatigue will improve  Description: Ability to implement measures to reduce episodes of fatigue will improve  Outcome: Ongoing     Problem: Coping:  Goal: Ability to cope will improve  Description: Ability to cope will improve  Outcome: Ongoing     Problem: Nutritional:  Goal: Nutritional status will improve  Description: Nutritional status will improve  Outcome: Ongoing     Problem: Physical Regulation:  Goal: Complications related to the disease process, condition or treatment will be avoided or minimized  Description: Complications

## 2020-05-02 NOTE — PLAN OF CARE
Problem: Falls - Risk of:  Goal: Will remain free from falls  Description: Will remain free from falls  Outcome: Ongoing  Goal: Absence of physical injury  Description: Absence of physical injury  Outcome: Ongoing     Problem: Nausea/Vomiting:  Goal: Absence of nausea/vomiting  Description: Absence of nausea/vomiting  Outcome: Ongoing  Goal: Able to drink  Description: Able to drink  Outcome: Ongoing  Goal: Able to eat  Description: Able to eat  Outcome: Ongoing  Goal: Ability to achieve adequate nutritional intake will improve  Description: Ability to achieve adequate nutritional intake will improve  Outcome: Ongoing     Problem: Diarrhea:  Goal: Bowel elimination is within specified parameters  Description: Bowel elimination is within specified parameters  Outcome: Ongoing  Goal: Passage of soft, formed stool  Description: Passage of soft, formed stool  Outcome: Ongoing  Goal: Establishment of normal bowel function will improve to within specified parameters  Description: Establishment of normal bowel function will improve to within specified parameters  Outcome: Ongoing     Problem: Activity:  Goal: Risk for activity intolerance will decrease  Description: Risk for activity intolerance will decrease  Outcome: Ongoing     Problem:  Bowel/Gastric:  Goal: Bowel function will improve  Description: Bowel function will improve  Outcome: Ongoing  Goal: Diagnostic test results will improve  Description: Diagnostic test results will improve  Outcome: Ongoing  Goal: Occurrences of nausea will decrease  Description: Occurrences of nausea will decrease  Outcome: Ongoing  Goal: Occurrences of vomiting will decrease  Description: Occurrences of vomiting will decrease  Outcome: Ongoing     Problem: Fluid Volume:  Goal: Maintenance of adequate hydration will improve  Description: Maintenance of adequate hydration will improve  Outcome: Ongoing     Problem: Health Behavior:  Goal: Ability to state signs and symptoms to report to health care provider will improve  Description: Ability to state signs and symptoms to report to health care provider will improve  Outcome: Ongoing     Problem: Physical Regulation:  Goal: Complications related to the disease process, condition or treatment will be avoided or minimized  Description: Complications related to the disease process, condition or treatment will be avoided or minimized  Outcome: Ongoing  Goal: Ability to maintain clinical measurements within normal limits will improve  Description: Ability to maintain clinical measurements within normal limits will improve  Outcome: Ongoing     Problem: Sensory:  Goal: Ability to identify factors that increase the pain will improve  Description: Ability to identify factors that increase the pain will improve  Outcome: Ongoing  Goal: Ability to notify healthcare provider of pain before it becomes unmanageable or unbearable will improve  Description: Ability to notify healthcare provider of pain before it becomes unmanageable or unbearable will improve  Outcome: Ongoing  Goal: Pain level will decrease  Description: Pain level will decrease  Outcome: Ongoing

## 2020-05-03 LAB
EKG ATRIAL RATE: 88 BPM
EKG DIAGNOSIS: NORMAL
EKG P AXIS: 29 DEGREES
EKG P-R INTERVAL: 164 MS
EKG Q-T INTERVAL: 374 MS
EKG QRS DURATION: 68 MS
EKG QTC CALCULATION (BAZETT): 452 MS
EKG R AXIS: 13 DEGREES
EKG T AXIS: 10 DEGREES
EKG VENTRICULAR RATE: 88 BPM

## 2020-05-03 PROCEDURE — 93010 ELECTROCARDIOGRAM REPORT: CPT | Performed by: INTERNAL MEDICINE

## 2020-05-04 ENCOUNTER — CARE COORDINATION (OUTPATIENT)
Dept: CASE MANAGEMENT | Age: 65
End: 2020-05-04

## 2020-05-15 ENCOUNTER — HOSPITAL ENCOUNTER (OUTPATIENT)
Dept: INFUSION THERAPY | Age: 65
Discharge: HOME | End: 2020-05-15
Payer: COMMERCIAL

## 2020-05-15 PROCEDURE — 99211 OFF/OP EST MAY X REQ PHY/QHP: CPT

## 2020-05-29 ENCOUNTER — HOSPITAL ENCOUNTER (OUTPATIENT)
Age: 65
Setting detail: SPECIMEN
Discharge: HOME | End: 2020-05-29
Payer: COMMERCIAL

## 2020-05-29 PROCEDURE — U0002 COVID-19 LAB TEST NON-CDC: HCPCS

## 2020-05-30 LAB
SARS-COV-2: NOT DETECTED
SOURCE: NORMAL

## 2020-06-03 ENCOUNTER — HOSPITAL ENCOUNTER (OUTPATIENT)
Dept: CT IMAGING | Age: 65
Discharge: HOME | End: 2020-06-03
Payer: COMMERCIAL

## 2020-06-03 ENCOUNTER — HOSPITAL ENCOUNTER (OUTPATIENT)
Dept: INTERVENTIONAL RADIOLOGY/VASCULAR | Age: 65
Discharge: HOME | End: 2020-06-03
Payer: COMMERCIAL

## 2020-06-03 VITALS
WEIGHT: 154 LBS | SYSTOLIC BLOOD PRESSURE: 111 MMHG | BODY MASS INDEX: 26.29 KG/M2 | OXYGEN SATURATION: 95 % | DIASTOLIC BLOOD PRESSURE: 73 MMHG | TEMPERATURE: 97.2 F | RESPIRATION RATE: 16 BRPM | HEIGHT: 64 IN | HEART RATE: 100 BPM

## 2020-06-03 LAB
APTT: 36.7 SECONDS (ref 25.1–37.1)
HCT VFR BLD CALC: 41.7 % (ref 37–47)
HEMOGLOBIN: 13.1 GM/DL (ref 12.5–16)
INR BLD: 1 INDEX
MCH RBC QN AUTO: 27.9 PG (ref 27–31)
MCHC RBC AUTO-ENTMCNC: 31.4 % (ref 32–36)
MCV RBC AUTO: 88.7 FL (ref 78–100)
PDW BLD-RTO: 18.1 % (ref 11.7–14.9)
PLATELET # BLD: 202 K/CU MM (ref 140–440)
PMV BLD AUTO: 10.3 FL (ref 7.5–11.1)
PROTHROMBIN TIME: 12.1 SECONDS (ref 11.7–14.5)
RBC # BLD: 4.7 M/CU MM (ref 4.2–5.4)
WBC # BLD: 5.6 K/CU MM (ref 4–10.5)

## 2020-06-03 PROCEDURE — 88334 PATH CONSLTJ SURG CYTO XM EA: CPT

## 2020-06-03 PROCEDURE — 6360000002 HC RX W HCPCS: Performed by: RADIOLOGY

## 2020-06-03 PROCEDURE — 85610 PROTHROMBIN TIME: CPT

## 2020-06-03 PROCEDURE — 2580000003 HC RX 258: Performed by: RADIOLOGY

## 2020-06-03 PROCEDURE — 7100000010 HC PHASE II RECOVERY - FIRST 15 MIN

## 2020-06-03 PROCEDURE — 88333 PATH CONSLTJ SURG CYTO XM 1: CPT

## 2020-06-03 PROCEDURE — 7100000011 HC PHASE II RECOVERY - ADDTL 15 MIN

## 2020-06-03 PROCEDURE — 2709999900 HC NON-CHARGEABLE SUPPLY

## 2020-06-03 PROCEDURE — 88341 IMHCHEM/IMCYTCHM EA ADD ANTB: CPT

## 2020-06-03 PROCEDURE — 85027 COMPLETE CBC AUTOMATED: CPT

## 2020-06-03 PROCEDURE — 88342 IMHCHEM/IMCYTCHM 1ST ANTB: CPT

## 2020-06-03 PROCEDURE — 88307 TISSUE EXAM BY PATHOLOGIST: CPT

## 2020-06-03 PROCEDURE — 85730 THROMBOPLASTIN TIME PARTIAL: CPT

## 2020-06-03 PROCEDURE — 2709999900 CT NEEDLE BIOPSY LIVER PERCUTANEOUS

## 2020-06-03 RX ORDER — ACETAMINOPHEN 325 MG/1
650 TABLET ORAL EVERY 4 HOURS PRN
Status: DISCONTINUED | OUTPATIENT
Start: 2020-06-03 | End: 2020-06-04 | Stop reason: HOSPADM

## 2020-06-03 RX ORDER — SODIUM CHLORIDE 0.9 % (FLUSH) 0.9 %
10 SYRINGE (ML) INJECTION 2 TIMES DAILY
Status: DISCONTINUED | OUTPATIENT
Start: 2020-06-03 | End: 2020-06-04 | Stop reason: HOSPADM

## 2020-06-03 RX ORDER — SODIUM CHLORIDE 9 MG/ML
INJECTION, SOLUTION INTRAVENOUS CONTINUOUS
Status: DISCONTINUED | OUTPATIENT
Start: 2020-06-03 | End: 2020-06-03 | Stop reason: HOSPADM

## 2020-06-03 RX ORDER — FENTANYL CITRATE 50 UG/ML
50 INJECTION, SOLUTION INTRAMUSCULAR; INTRAVENOUS ONCE
Status: COMPLETED | OUTPATIENT
Start: 2020-06-03 | End: 2020-06-03

## 2020-06-03 RX ORDER — MIDAZOLAM HYDROCHLORIDE 1 MG/ML
1 INJECTION INTRAMUSCULAR; INTRAVENOUS ONCE
Status: COMPLETED | OUTPATIENT
Start: 2020-06-03 | End: 2020-06-03

## 2020-06-03 RX ADMIN — SODIUM CHLORIDE: 9 INJECTION, SOLUTION INTRAVENOUS at 10:11

## 2020-06-03 RX ADMIN — Medication 10 ML: at 08:50

## 2020-06-03 RX ADMIN — MIDAZOLAM 1 MG: 1 INJECTION INTRAMUSCULAR; INTRAVENOUS at 10:25

## 2020-06-03 RX ADMIN — FENTANYL CITRATE 50 MCG: 50 INJECTION INTRAMUSCULAR; INTRAVENOUS at 10:25

## 2020-06-03 ASSESSMENT — PAIN - FUNCTIONAL ASSESSMENT: PAIN_FUNCTIONAL_ASSESSMENT: 0-10

## 2020-06-03 ASSESSMENT — PAIN SCALES - GENERAL
PAINLEVEL_OUTOF10: 0
PAINLEVEL_OUTOF10: 3

## 2020-06-03 NOTE — PROGRESS NOTES
Procedure: CT guided Liver biopsy with Dr. Arti Summers IR. Cytology/Pathology present: Yes    Sedation: Yes    Outcome: Successful 18 ga cores taken (4) and passed to the Pathology Dr. present. Patient semi alert, responds to verbal commands, voices no c/o pain or no distress, noted by RN. Vss, orders received, Report called to Ray Cuba RN. Transport requested.     Jg TREVIZO IR 3

## 2020-06-03 NOTE — H&P
[facility]    KJVS:[birth year]  Name:MaryLee Lenix   QIS:[id number]   #:[id number]    [address] [facility]   Referring Physician:   Woo  Chief Complaint:  Liver mass bx  History of Present Illness:   same    HISTORY AND PHYSICAL  Liver lesion [K76.9]    Past Medical History:  [unfilled]    Past Surgical History:  Past Surgical History:   Procedure Laterality Date    BLADDER SURGERY      bladder lift    CHOLECYSTECTOMY      HYSTERECTOMY      partial - still has ovaries    LOBECTOMY  2002    right upper    LUNG BIOPSY Right 2002    LUNG BIOPSY  2017    EBUS with biopsy in 6/2017 and 8/2017    PILONIDAL CYST EXCISION      TUNNELED VENOUS PORT PLACEMENT Left        Social History:  Social History     Socioeconomic History    Marital status:      Spouse name: Not on file    Number of children: Not on file    Years of education: Not on file    Highest education level: Not on file   Occupational History    Not on file   Social Needs    Financial resource strain: Not on file    Food insecurity     Worry: Not on file     Inability: Not on file    Transportation needs     Medical: Not on file     Non-medical: Not on file   Tobacco Use    Smoking status: Never Smoker    Smokeless tobacco: Never Used   Substance and Sexual Activity    Alcohol use: No    Drug use: No    Sexual activity: Not on file   Lifestyle    Physical activity     Days per week: Not on file     Minutes per session: Not on file    Stress: Not on file   Relationships    Social connections     Talks on phone: Not on file     Gets together: Not on file     Attends [employer]ism service: Not on file     Active member of club or organization: Not on file     Attends meetings of clubs or organizations: Not on file     Relationship status: Not on file    Intimate partner violence     Fear of current or ex partner: Not on file     Emotionally abused: Not on file     Physically abused: Not on file     Forced sexual activity: Not on file Other Topics Concern    Not on file   Social History Narrative    Not on file       Family History:  History reviewed. No pertinent family history. Allergies: Allergies   Allergen Reactions    Amoxicillin-Pot Clavulanate Diarrhea    Prednisone & Diphenhydramine Other (See Comments)     All steroids. Patient can't walk, has nausea and diarrhea.  Sertraline Hcl Other (See Comments)    Adhesive Tape Rash     Itching and rash    Codeine Nausea And Vomiting       Medications:  Current Outpatient Medications on File Prior to Encounter   Medication Sig Dispense Refill    furosemide (LASIX) 20 MG tablet Take 1 tablet by mouth daily as needed (Edema) 30 tablet 1    potassium chloride (KLOR-CON M) 20 MEQ extended release tablet Take 1 tablet by mouth daily as needed (Please take pill along with Lasix) 30 tablet 1    dexamethasone (DECADRON) 4 MG tablet Take 4 mg by mouth daily Indications: with chemo      oxyCODONE-acetaminophen (PERCOCET) 5-325 MG per tablet Take 1 tablet by mouth every 4 hours as needed for Pain.  benzonatate (TESSALON) 100 MG capsule Take 200 mg by mouth 3 times daily as needed for Cough      LORazepam (ATIVAN) 0.5 MG tablet Take 0.5 mg by mouth every 6 hours as needed for Anxiety.       guaiFENesin (MUCINEX) 600 MG extended release tablet Take 1 tablet by mouth 2 times daily 20 tablet 0    ipratropium-albuterol (DUONEB) 0.5-2.5 (3) MG/3ML SOLN nebulizer solution Inhale 3 mLs into the lungs every 4 hours (while awake) 678 mL 0    Garlic 2168 MG CAPS Take 1 capsule by mouth nightly      Cinnamon 500 MG TABS Take 1 tablet by mouth nightly      B Complex-Biotin-FA (B-COMPLEX PO) Take 1 each by mouth nightly      TURMERIC PO Take 1 each by mouth nightly      aspirin 325 MG tablet Take by mouth      Cholecalciferol 1000 units CHEW Take 2,000 Units by mouth      coenzyme Q10 60 MG CAPS Take 60 mg by mouth nightly       RA KRILL OIL CAPS Take 1 capsule by mouth nightly      

## 2020-06-05 RX ORDER — DIPHENHYDRAMINE HYDROCHLORIDE 50 MG/ML
25 INJECTION INTRAMUSCULAR; INTRAVENOUS ONCE
Status: CANCELLED | OUTPATIENT
Start: 2020-06-08

## 2020-06-05 RX ORDER — ONDANSETRON 2 MG/ML
8 INJECTION INTRAMUSCULAR; INTRAVENOUS ONCE
Status: CANCELLED | OUTPATIENT
Start: 2020-06-08

## 2020-06-05 RX ORDER — DEXAMETHASONE SODIUM PHOSPHATE 10 MG/ML
6 INJECTION, SOLUTION INTRAMUSCULAR; INTRAVENOUS ONCE
Status: CANCELLED | OUTPATIENT
Start: 2020-06-08

## 2020-06-12 ENCOUNTER — HOSPITAL ENCOUNTER (OUTPATIENT)
Dept: ULTRASOUND IMAGING | Age: 65
Discharge: HOME | End: 2020-06-12
Payer: COMMERCIAL

## 2020-06-12 PROCEDURE — 93880 EXTRACRANIAL BILAT STUDY: CPT

## 2020-06-19 ENCOUNTER — HOSPITAL ENCOUNTER (OUTPATIENT)
Dept: INFUSION THERAPY | Age: 65
Discharge: HOME | End: 2020-06-19
Payer: COMMERCIAL

## 2020-06-19 PROCEDURE — 99211 OFF/OP EST MAY X REQ PHY/QHP: CPT

## 2020-06-19 PROCEDURE — G0463 HOSPITAL OUTPT CLINIC VISIT: HCPCS

## 2020-06-30 ENCOUNTER — HOSPITAL ENCOUNTER (OUTPATIENT)
Dept: INFUSION THERAPY | Age: 65
Discharge: HOME | End: 2020-06-30
Payer: COMMERCIAL

## 2020-06-30 PROCEDURE — 99211 OFF/OP EST MAY X REQ PHY/QHP: CPT

## 2020-06-30 PROCEDURE — G0463 HOSPITAL OUTPT CLINIC VISIT: HCPCS

## 2020-07-14 ENCOUNTER — HOSPITAL ENCOUNTER (OUTPATIENT)
Dept: INFUSION THERAPY | Age: 65
Discharge: HOME | End: 2020-07-14
Payer: COMMERCIAL

## 2020-07-14 LAB
ALBUMIN SERPL-MCNC: 3.5 GM/DL (ref 3.4–5)
ALP BLD-CCNC: 173 IU/L (ref 40–128)
ALT SERPL-CCNC: 47 U/L (ref 10–40)
ANION GAP SERPL CALCULATED.3IONS-SCNC: 13 MMOL/L (ref 4–16)
AST SERPL-CCNC: 58 IU/L (ref 15–37)
BASOPHILS ABSOLUTE: 0.1 K/CU MM
BASOPHILS RELATIVE PERCENT: 0.9 % (ref 0–1)
BILIRUB SERPL-MCNC: 0.3 MG/DL (ref 0–1)
BUN BLDV-MCNC: 6 MG/DL (ref 6–23)
CALCIUM SERPL-MCNC: 9.2 MG/DL (ref 8.3–10.6)
CHLORIDE BLD-SCNC: 98 MMOL/L (ref 99–110)
CO2: 27 MMOL/L (ref 21–32)
CREAT SERPL-MCNC: 0.5 MG/DL (ref 0.6–1.1)
DIFFERENTIAL TYPE: ABNORMAL
EOSINOPHILS ABSOLUTE: 0.1 K/CU MM
EOSINOPHILS RELATIVE PERCENT: 2.1 % (ref 0–3)
GFR AFRICAN AMERICAN: >60 ML/MIN/1.73M2
GFR NON-AFRICAN AMERICAN: >60 ML/MIN/1.73M2
GLUCOSE BLD-MCNC: 90 MG/DL (ref 70–99)
HCT VFR BLD CALC: 38 % (ref 37–47)
HEMOGLOBIN: 12.4 GM/DL (ref 12.5–16)
LYMPHOCYTES ABSOLUTE: 1.2 K/CU MM
LYMPHOCYTES RELATIVE PERCENT: 19.9 % (ref 24–44)
MCH RBC QN AUTO: 28.3 PG (ref 27–31)
MCHC RBC AUTO-ENTMCNC: 32.6 % (ref 32–36)
MCV RBC AUTO: 86.8 FL (ref 78–100)
MONOCYTES ABSOLUTE: 0.9 K/CU MM
MONOCYTES RELATIVE PERCENT: 15.1 % (ref 0–4)
PDW BLD-RTO: 18.7 % (ref 11.7–14.9)
PLATELET # BLD: 223 K/CU MM (ref 140–440)
PMV BLD AUTO: 9.7 FL (ref 7.5–11.1)
POTASSIUM SERPL-SCNC: 4.4 MMOL/L (ref 3.5–5.1)
RBC # BLD: 4.38 M/CU MM (ref 4.2–5.4)
SEGMENTED NEUTROPHILS ABSOLUTE COUNT: 3.6 K/CU MM
SEGMENTED NEUTROPHILS RELATIVE PERCENT: 62 % (ref 36–66)
SODIUM BLD-SCNC: 138 MMOL/L (ref 135–145)
TOTAL PROTEIN: 5.9 GM/DL (ref 6.4–8.2)
WBC # BLD: 5.8 K/CU MM (ref 4–10.5)

## 2020-07-14 PROCEDURE — 96413 CHEMO IV INFUSION 1 HR: CPT

## 2020-07-14 PROCEDURE — 36415 COLL VENOUS BLD VENIPUNCTURE: CPT

## 2020-07-14 PROCEDURE — 2580000003 HC RX 258: Performed by: INTERNAL MEDICINE

## 2020-07-14 PROCEDURE — 85025 COMPLETE CBC W/AUTO DIFF WBC: CPT

## 2020-07-14 PROCEDURE — 96375 TX/PRO/DX INJ NEW DRUG ADDON: CPT

## 2020-07-14 PROCEDURE — 6360000002 HC RX W HCPCS: Performed by: INTERNAL MEDICINE

## 2020-07-14 PROCEDURE — 80053 COMPREHEN METABOLIC PANEL: CPT

## 2020-07-14 PROCEDURE — 6360000002 HC RX W HCPCS

## 2020-07-14 RX ORDER — HEPARIN SODIUM (PORCINE) LOCK FLUSH IV SOLN 100 UNIT/ML 100 UNIT/ML
SOLUTION INTRAVENOUS
Status: DISCONTINUED
Start: 2020-07-14 | End: 2020-07-15 | Stop reason: HOSPADM

## 2020-07-14 RX ORDER — ONDANSETRON 2 MG/ML
8 INJECTION INTRAMUSCULAR; INTRAVENOUS ONCE
Status: DISCONTINUED | OUTPATIENT
Start: 2020-07-14 | End: 2020-07-15 | Stop reason: HOSPADM

## 2020-07-14 RX ORDER — ONDANSETRON 2 MG/ML
INJECTION INTRAMUSCULAR; INTRAVENOUS
Status: DISCONTINUED
Start: 2020-07-14 | End: 2020-07-15 | Stop reason: HOSPADM

## 2020-07-21 ENCOUNTER — HOSPITAL ENCOUNTER (OUTPATIENT)
Dept: INFUSION THERAPY | Age: 65
Discharge: HOME | End: 2020-07-21
Payer: COMMERCIAL

## 2020-07-21 LAB
ALBUMIN SERPL-MCNC: 3.6 GM/DL (ref 3.4–5)
ALP BLD-CCNC: 226 IU/L (ref 40–129)
ALT SERPL-CCNC: 43 U/L (ref 10–40)
ANION GAP SERPL CALCULATED.3IONS-SCNC: 14 MMOL/L (ref 4–16)
AST SERPL-CCNC: 46 IU/L (ref 15–37)
BASOPHILS ABSOLUTE: 0 K/CU MM
BASOPHILS RELATIVE PERCENT: 0.6 % (ref 0–1)
BILIRUB SERPL-MCNC: 0.2 MG/DL (ref 0–1)
BUN BLDV-MCNC: 5 MG/DL (ref 6–23)
CALCIUM SERPL-MCNC: 9 MG/DL (ref 8.3–10.6)
CHLORIDE BLD-SCNC: 95 MMOL/L (ref 99–110)
CO2: 26 MMOL/L (ref 21–32)
CREAT SERPL-MCNC: 0.5 MG/DL (ref 0.6–1.1)
DIFFERENTIAL TYPE: ABNORMAL
EOSINOPHILS ABSOLUTE: 0.1 K/CU MM
EOSINOPHILS RELATIVE PERCENT: 1.4 % (ref 0–3)
GFR AFRICAN AMERICAN: >60 ML/MIN/1.73M2
GFR NON-AFRICAN AMERICAN: >60 ML/MIN/1.73M2
GLUCOSE BLD-MCNC: 102 MG/DL (ref 70–99)
HCT VFR BLD CALC: 34.3 % (ref 37–47)
HEMOGLOBIN: 11.4 GM/DL (ref 12.5–16)
LYMPHOCYTES ABSOLUTE: 1 K/CU MM
LYMPHOCYTES RELATIVE PERCENT: 26.2 % (ref 24–44)
MCH RBC QN AUTO: 28.6 PG (ref 27–31)
MCHC RBC AUTO-ENTMCNC: 33.2 % (ref 32–36)
MCV RBC AUTO: 86.2 FL (ref 78–100)
MONOCYTES ABSOLUTE: 0.6 K/CU MM
MONOCYTES RELATIVE PERCENT: 17.4 % (ref 0–4)
PDW BLD-RTO: 17.4 % (ref 11.7–14.9)
PLATELET # BLD: 154 K/CU MM (ref 140–440)
PMV BLD AUTO: 9 FL (ref 7.5–11.1)
POTASSIUM SERPL-SCNC: 3.9 MMOL/L (ref 3.5–5.1)
RBC # BLD: 3.98 M/CU MM (ref 4.2–5.4)
SEGMENTED NEUTROPHILS ABSOLUTE COUNT: 2 K/CU MM
SEGMENTED NEUTROPHILS RELATIVE PERCENT: 54.4 % (ref 36–66)
SODIUM BLD-SCNC: 135 MMOL/L (ref 135–145)
TOTAL PROTEIN: 6 GM/DL (ref 6.4–8.2)
WBC # BLD: 3.6 K/CU MM (ref 4–10.5)

## 2020-07-21 PROCEDURE — 85025 COMPLETE CBC W/AUTO DIFF WBC: CPT

## 2020-07-21 PROCEDURE — 2580000003 HC RX 258: Performed by: INTERNAL MEDICINE

## 2020-07-21 PROCEDURE — 36415 COLL VENOUS BLD VENIPUNCTURE: CPT

## 2020-07-21 PROCEDURE — 96361 HYDRATE IV INFUSION ADD-ON: CPT

## 2020-07-21 PROCEDURE — 6360000002 HC RX W HCPCS

## 2020-07-21 PROCEDURE — 96374 THER/PROPH/DIAG INJ IV PUSH: CPT

## 2020-07-21 PROCEDURE — 80053 COMPREHEN METABOLIC PANEL: CPT

## 2020-07-21 RX ORDER — ONDANSETRON 2 MG/ML
INJECTION INTRAMUSCULAR; INTRAVENOUS
Status: DISCONTINUED
Start: 2020-07-21 | End: 2020-07-22 | Stop reason: HOSPADM

## 2020-07-21 RX ORDER — SODIUM CHLORIDE 9 MG/ML
INJECTION, SOLUTION INTRAVENOUS ONCE
Status: DISCONTINUED | OUTPATIENT
Start: 2020-07-21 | End: 2020-07-22 | Stop reason: HOSPADM

## 2020-07-21 RX ORDER — HEPARIN SODIUM (PORCINE) LOCK FLUSH IV SOLN 100 UNIT/ML 100 UNIT/ML
SOLUTION INTRAVENOUS
Status: DISCONTINUED
Start: 2020-07-21 | End: 2020-07-22 | Stop reason: HOSPADM

## 2020-07-21 RX ORDER — ONDANSETRON 2 MG/ML
8 INJECTION INTRAMUSCULAR; INTRAVENOUS ONCE
Status: DISCONTINUED | OUTPATIENT
Start: 2020-07-21 | End: 2020-07-22 | Stop reason: HOSPADM

## 2020-07-28 ENCOUNTER — HOSPITAL ENCOUNTER (OUTPATIENT)
Dept: INFUSION THERAPY | Age: 65
Discharge: HOME | End: 2020-07-28
Payer: COMMERCIAL

## 2020-07-28 PROCEDURE — 99211 OFF/OP EST MAY X REQ PHY/QHP: CPT

## 2020-07-28 PROCEDURE — G0463 HOSPITAL OUTPT CLINIC VISIT: HCPCS

## 2020-08-06 RX ORDER — OXYCODONE HYDROCHLORIDE AND ACETAMINOPHEN 5; 325 MG/1; MG/1
1 TABLET ORAL EVERY 4 HOURS PRN
Qty: 60 TABLET | Refills: 0 | Status: SHIPPED | OUTPATIENT
Start: 2020-08-06 | End: 2020-08-06 | Stop reason: SDUPTHER

## 2020-08-06 RX ORDER — OXYCODONE HYDROCHLORIDE AND ACETAMINOPHEN 5; 325 MG/1; MG/1
1 TABLET ORAL EVERY 4 HOURS PRN
Qty: 60 TABLET | Refills: 0 | Status: SHIPPED | OUTPATIENT
Start: 2020-08-06 | End: 2020-08-07 | Stop reason: SDUPTHER

## 2020-08-07 RX ORDER — OXYCODONE HYDROCHLORIDE AND ACETAMINOPHEN 5; 325 MG/1; MG/1
1 TABLET ORAL EVERY 4 HOURS PRN
Qty: 60 TABLET | Refills: 0 | Status: SHIPPED | OUTPATIENT
Start: 2020-08-07 | End: 2020-08-17

## 2020-08-12 PROBLEM — E86.0 DEHYDRATION: Status: ACTIVE | Noted: 2020-08-12

## 2020-08-12 PROBLEM — M79.671 PAIN IN RIGHT FOOT: Status: ACTIVE | Noted: 2020-08-12

## 2020-08-12 PROBLEM — R11.2 NAUSEA WITH VOMITING, UNSPECIFIED: Status: ACTIVE | Noted: 2020-08-12

## 2020-08-24 RX ORDER — HEPARIN SODIUM (PORCINE) LOCK FLUSH IV SOLN 100 UNIT/ML 100 UNIT/ML
500 SOLUTION INTRAVENOUS PRN
Status: CANCELLED | OUTPATIENT
Start: 2020-08-24

## 2020-08-24 RX ORDER — METHYLPREDNISOLONE SODIUM SUCCINATE 125 MG/2ML
125 INJECTION, POWDER, LYOPHILIZED, FOR SOLUTION INTRAMUSCULAR; INTRAVENOUS ONCE
Status: CANCELLED | OUTPATIENT
Start: 2020-12-07

## 2020-08-24 RX ORDER — SODIUM CHLORIDE 9 MG/ML
20 INJECTION, SOLUTION INTRAVENOUS ONCE
Status: CANCELLED | OUTPATIENT
Start: 2020-10-19

## 2020-08-24 RX ORDER — SODIUM CHLORIDE 0.9 % (FLUSH) 0.9 %
5 SYRINGE (ML) INJECTION PRN
Status: CANCELLED | OUTPATIENT
Start: 2020-08-24

## 2020-08-24 RX ORDER — DIPHENHYDRAMINE HYDROCHLORIDE 50 MG/ML
50 INJECTION INTRAMUSCULAR; INTRAVENOUS ONCE
Status: CANCELLED | OUTPATIENT
Start: 2020-11-16

## 2020-08-24 RX ORDER — SODIUM CHLORIDE 9 MG/ML
INJECTION, SOLUTION INTRAVENOUS CONTINUOUS
Status: CANCELLED | OUTPATIENT
Start: 2020-09-14

## 2020-08-24 RX ORDER — METHYLPREDNISOLONE SODIUM SUCCINATE 125 MG/2ML
125 INJECTION, POWDER, LYOPHILIZED, FOR SOLUTION INTRAMUSCULAR; INTRAVENOUS ONCE
Status: CANCELLED | OUTPATIENT
Start: 2020-12-21

## 2020-08-24 RX ORDER — SODIUM CHLORIDE 0.9 % (FLUSH) 0.9 %
5 SYRINGE (ML) INJECTION PRN
Status: CANCELLED | OUTPATIENT
Start: 2020-11-09

## 2020-08-24 RX ORDER — SODIUM CHLORIDE 9 MG/ML
INJECTION, SOLUTION INTRAVENOUS CONTINUOUS
Status: CANCELLED | OUTPATIENT
Start: 2020-10-12

## 2020-08-24 RX ORDER — SODIUM CHLORIDE 0.9 % (FLUSH) 0.9 %
10 SYRINGE (ML) INJECTION PRN
Status: CANCELLED | OUTPATIENT
Start: 2020-10-12

## 2020-08-24 RX ORDER — SODIUM CHLORIDE 0.9 % (FLUSH) 0.9 %
5 SYRINGE (ML) INJECTION PRN
Status: CANCELLED | OUTPATIENT
Start: 2020-10-12

## 2020-08-24 RX ORDER — SODIUM CHLORIDE 9 MG/ML
20 INJECTION, SOLUTION INTRAVENOUS ONCE
Status: CANCELLED | OUTPATIENT
Start: 2020-08-31

## 2020-08-24 RX ORDER — SODIUM CHLORIDE 9 MG/ML
INJECTION, SOLUTION INTRAVENOUS CONTINUOUS
Status: CANCELLED | OUTPATIENT
Start: 2020-12-14

## 2020-08-24 RX ORDER — EPINEPHRINE 1 MG/ML
0.3 INJECTION, SOLUTION, CONCENTRATE INTRAVENOUS PRN
Status: CANCELLED | OUTPATIENT
Start: 2020-10-19

## 2020-08-24 RX ORDER — ONDANSETRON 2 MG/ML
8 INJECTION INTRAMUSCULAR; INTRAVENOUS ONCE
Status: CANCELLED
Start: 2020-12-07

## 2020-08-24 RX ORDER — DIPHENHYDRAMINE HYDROCHLORIDE 50 MG/ML
50 INJECTION INTRAMUSCULAR; INTRAVENOUS ONCE
Status: CANCELLED | OUTPATIENT
Start: 2020-10-19

## 2020-08-24 RX ORDER — SODIUM CHLORIDE 9 MG/ML
20 INJECTION, SOLUTION INTRAVENOUS ONCE
Status: CANCELLED | OUTPATIENT
Start: 2020-09-21

## 2020-08-24 RX ORDER — ONDANSETRON 2 MG/ML
8 INJECTION INTRAMUSCULAR; INTRAVENOUS ONCE
Status: CANCELLED
Start: 2020-12-21

## 2020-08-24 RX ORDER — METHYLPREDNISOLONE SODIUM SUCCINATE 125 MG/2ML
125 INJECTION, POWDER, LYOPHILIZED, FOR SOLUTION INTRAMUSCULAR; INTRAVENOUS ONCE
Status: CANCELLED | OUTPATIENT
Start: 2020-08-31

## 2020-08-24 RX ORDER — EPINEPHRINE 1 MG/ML
0.3 INJECTION, SOLUTION, CONCENTRATE INTRAVENOUS PRN
Status: CANCELLED | OUTPATIENT
Start: 2020-12-21

## 2020-08-24 RX ORDER — EPINEPHRINE 1 MG/ML
0.3 INJECTION, SOLUTION, CONCENTRATE INTRAVENOUS PRN
Status: CANCELLED | OUTPATIENT
Start: 2020-11-23

## 2020-08-24 RX ORDER — DIPHENHYDRAMINE HYDROCHLORIDE 50 MG/ML
50 INJECTION INTRAMUSCULAR; INTRAVENOUS ONCE
Status: CANCELLED | OUTPATIENT
Start: 2020-12-21

## 2020-08-24 RX ORDER — DIPHENHYDRAMINE HYDROCHLORIDE 50 MG/ML
50 INJECTION INTRAMUSCULAR; INTRAVENOUS ONCE
Status: CANCELLED | OUTPATIENT
Start: 2020-12-14

## 2020-08-24 RX ORDER — HEPARIN SODIUM (PORCINE) LOCK FLUSH IV SOLN 100 UNIT/ML 100 UNIT/ML
500 SOLUTION INTRAVENOUS PRN
Status: CANCELLED | OUTPATIENT
Start: 2020-11-16

## 2020-08-24 RX ORDER — ONDANSETRON 2 MG/ML
8 INJECTION INTRAMUSCULAR; INTRAVENOUS ONCE
Status: CANCELLED
Start: 2020-11-09

## 2020-08-24 RX ORDER — SODIUM CHLORIDE 0.9 % (FLUSH) 0.9 %
5 SYRINGE (ML) INJECTION PRN
Status: CANCELLED | OUTPATIENT
Start: 2020-12-14

## 2020-08-24 RX ORDER — HEPARIN SODIUM (PORCINE) LOCK FLUSH IV SOLN 100 UNIT/ML 100 UNIT/ML
500 SOLUTION INTRAVENOUS PRN
Status: CANCELLED | OUTPATIENT
Start: 2020-09-21

## 2020-08-24 RX ORDER — SODIUM CHLORIDE 0.9 % (FLUSH) 0.9 %
5 SYRINGE (ML) INJECTION PRN
Status: CANCELLED | OUTPATIENT
Start: 2020-09-28

## 2020-08-24 RX ORDER — ONDANSETRON 2 MG/ML
8 INJECTION INTRAMUSCULAR; INTRAVENOUS ONCE
Status: CANCELLED
Start: 2020-10-12

## 2020-08-24 RX ORDER — EPINEPHRINE 1 MG/ML
0.3 INJECTION, SOLUTION, CONCENTRATE INTRAVENOUS PRN
Status: CANCELLED | OUTPATIENT
Start: 2020-11-16

## 2020-08-24 RX ORDER — SODIUM CHLORIDE 9 MG/ML
INJECTION, SOLUTION INTRAVENOUS CONTINUOUS
Status: CANCELLED | OUTPATIENT
Start: 2020-11-23

## 2020-08-24 RX ORDER — EPINEPHRINE 1 MG/ML
0.3 INJECTION, SOLUTION, CONCENTRATE INTRAVENOUS PRN
Status: CANCELLED | OUTPATIENT
Start: 2020-11-09

## 2020-08-24 RX ORDER — HEPARIN SODIUM (PORCINE) LOCK FLUSH IV SOLN 100 UNIT/ML 100 UNIT/ML
500 SOLUTION INTRAVENOUS PRN
Status: CANCELLED | OUTPATIENT
Start: 2020-09-14

## 2020-08-24 RX ORDER — SODIUM CHLORIDE 9 MG/ML
INJECTION, SOLUTION INTRAVENOUS CONTINUOUS
Status: CANCELLED | OUTPATIENT
Start: 2020-11-09

## 2020-08-24 RX ORDER — SODIUM CHLORIDE 0.9 % (FLUSH) 0.9 %
10 SYRINGE (ML) INJECTION PRN
Status: CANCELLED | OUTPATIENT
Start: 2020-09-14

## 2020-08-24 RX ORDER — HEPARIN SODIUM (PORCINE) LOCK FLUSH IV SOLN 100 UNIT/ML 100 UNIT/ML
500 SOLUTION INTRAVENOUS PRN
Status: CANCELLED | OUTPATIENT
Start: 2020-12-14

## 2020-08-24 RX ORDER — HEPARIN SODIUM (PORCINE) LOCK FLUSH IV SOLN 100 UNIT/ML 100 UNIT/ML
500 SOLUTION INTRAVENOUS PRN
Status: CANCELLED | OUTPATIENT
Start: 2020-10-19

## 2020-08-24 RX ORDER — EPINEPHRINE 1 MG/ML
0.3 INJECTION, SOLUTION, CONCENTRATE INTRAVENOUS PRN
Status: CANCELLED | OUTPATIENT
Start: 2020-10-26

## 2020-08-24 RX ORDER — DIPHENHYDRAMINE HYDROCHLORIDE 50 MG/ML
50 INJECTION INTRAMUSCULAR; INTRAVENOUS ONCE
Status: CANCELLED | OUTPATIENT
Start: 2020-11-09

## 2020-08-24 RX ORDER — SODIUM CHLORIDE 9 MG/ML
20 INJECTION, SOLUTION INTRAVENOUS ONCE
Status: CANCELLED | OUTPATIENT
Start: 2020-08-24

## 2020-08-24 RX ORDER — METHYLPREDNISOLONE SODIUM SUCCINATE 125 MG/2ML
125 INJECTION, POWDER, LYOPHILIZED, FOR SOLUTION INTRAMUSCULAR; INTRAVENOUS ONCE
Status: CANCELLED | OUTPATIENT
Start: 2020-09-14

## 2020-08-24 RX ORDER — HEPARIN SODIUM (PORCINE) LOCK FLUSH IV SOLN 100 UNIT/ML 100 UNIT/ML
500 SOLUTION INTRAVENOUS PRN
Status: CANCELLED | OUTPATIENT
Start: 2020-10-26

## 2020-08-24 RX ORDER — EPINEPHRINE 1 MG/ML
0.3 INJECTION, SOLUTION, CONCENTRATE INTRAVENOUS PRN
Status: CANCELLED | OUTPATIENT
Start: 2020-09-21

## 2020-08-24 RX ORDER — SODIUM CHLORIDE 0.9 % (FLUSH) 0.9 %
10 SYRINGE (ML) INJECTION PRN
Status: CANCELLED | OUTPATIENT
Start: 2020-10-26

## 2020-08-24 RX ORDER — SODIUM CHLORIDE 9 MG/ML
20 INJECTION, SOLUTION INTRAVENOUS ONCE
Status: CANCELLED | OUTPATIENT
Start: 2020-11-23

## 2020-08-24 RX ORDER — ONDANSETRON 2 MG/ML
8 INJECTION INTRAMUSCULAR; INTRAVENOUS ONCE
Status: CANCELLED
Start: 2020-09-28

## 2020-08-24 RX ORDER — SODIUM CHLORIDE 9 MG/ML
INJECTION, SOLUTION INTRAVENOUS CONTINUOUS
Status: CANCELLED | OUTPATIENT
Start: 2020-12-07

## 2020-08-24 RX ORDER — METHYLPREDNISOLONE SODIUM SUCCINATE 125 MG/2ML
125 INJECTION, POWDER, LYOPHILIZED, FOR SOLUTION INTRAMUSCULAR; INTRAVENOUS ONCE
Status: CANCELLED | OUTPATIENT
Start: 2020-08-24

## 2020-08-24 RX ORDER — METHYLPREDNISOLONE SODIUM SUCCINATE 125 MG/2ML
125 INJECTION, POWDER, LYOPHILIZED, FOR SOLUTION INTRAMUSCULAR; INTRAVENOUS ONCE
Status: CANCELLED | OUTPATIENT
Start: 2020-10-26

## 2020-08-24 RX ORDER — DIPHENHYDRAMINE HYDROCHLORIDE 50 MG/ML
50 INJECTION INTRAMUSCULAR; INTRAVENOUS ONCE
Status: CANCELLED | OUTPATIENT
Start: 2020-11-23

## 2020-08-24 RX ORDER — SODIUM CHLORIDE 0.9 % (FLUSH) 0.9 %
10 SYRINGE (ML) INJECTION PRN
Status: CANCELLED | OUTPATIENT
Start: 2020-08-31

## 2020-08-24 RX ORDER — SODIUM CHLORIDE 9 MG/ML
20 INJECTION, SOLUTION INTRAVENOUS ONCE
Status: CANCELLED | OUTPATIENT
Start: 2020-11-16

## 2020-08-24 RX ORDER — DIPHENHYDRAMINE HYDROCHLORIDE 50 MG/ML
50 INJECTION INTRAMUSCULAR; INTRAVENOUS ONCE
Status: CANCELLED | OUTPATIENT
Start: 2020-10-12

## 2020-08-24 RX ORDER — DIPHENHYDRAMINE HYDROCHLORIDE 50 MG/ML
50 INJECTION INTRAMUSCULAR; INTRAVENOUS ONCE
Status: CANCELLED | OUTPATIENT
Start: 2020-10-26

## 2020-08-24 RX ORDER — EPINEPHRINE 1 MG/ML
0.3 INJECTION, SOLUTION, CONCENTRATE INTRAVENOUS PRN
Status: CANCELLED | OUTPATIENT
Start: 2020-08-31

## 2020-08-24 RX ORDER — SODIUM CHLORIDE 9 MG/ML
20 INJECTION, SOLUTION INTRAVENOUS ONCE
Status: CANCELLED | OUTPATIENT
Start: 2020-12-14

## 2020-08-24 RX ORDER — DIPHENHYDRAMINE HYDROCHLORIDE 50 MG/ML
50 INJECTION INTRAMUSCULAR; INTRAVENOUS ONCE
Status: CANCELLED | OUTPATIENT
Start: 2020-08-24

## 2020-08-24 RX ORDER — EPINEPHRINE 1 MG/ML
0.3 INJECTION, SOLUTION, CONCENTRATE INTRAVENOUS PRN
Status: CANCELLED | OUTPATIENT
Start: 2020-09-14

## 2020-08-24 RX ORDER — SODIUM CHLORIDE 9 MG/ML
20 INJECTION, SOLUTION INTRAVENOUS ONCE
Status: CANCELLED | OUTPATIENT
Start: 2020-10-12

## 2020-08-24 RX ORDER — SODIUM CHLORIDE 9 MG/ML
20 INJECTION, SOLUTION INTRAVENOUS ONCE
Status: CANCELLED | OUTPATIENT
Start: 2020-09-14

## 2020-08-24 RX ORDER — SODIUM CHLORIDE 0.9 % (FLUSH) 0.9 %
10 SYRINGE (ML) INJECTION PRN
Status: CANCELLED | OUTPATIENT
Start: 2020-11-09

## 2020-08-24 RX ORDER — SODIUM CHLORIDE 9 MG/ML
INJECTION, SOLUTION INTRAVENOUS CONTINUOUS
Status: CANCELLED | OUTPATIENT
Start: 2020-10-19

## 2020-08-24 RX ORDER — EPINEPHRINE 1 MG/ML
0.3 INJECTION, SOLUTION, CONCENTRATE INTRAVENOUS PRN
Status: CANCELLED | OUTPATIENT
Start: 2020-12-07

## 2020-08-24 RX ORDER — SODIUM CHLORIDE 0.9 % (FLUSH) 0.9 %
10 SYRINGE (ML) INJECTION PRN
Status: CANCELLED | OUTPATIENT
Start: 2020-12-21

## 2020-08-24 RX ORDER — ONDANSETRON 2 MG/ML
8 INJECTION INTRAMUSCULAR; INTRAVENOUS ONCE
Status: CANCELLED
Start: 2020-08-31

## 2020-08-24 RX ORDER — ONDANSETRON 2 MG/ML
8 INJECTION INTRAMUSCULAR; INTRAVENOUS ONCE
Status: CANCELLED
Start: 2020-10-26

## 2020-08-24 RX ORDER — METHYLPREDNISOLONE SODIUM SUCCINATE 125 MG/2ML
125 INJECTION, POWDER, LYOPHILIZED, FOR SOLUTION INTRAMUSCULAR; INTRAVENOUS ONCE
Status: CANCELLED | OUTPATIENT
Start: 2020-09-28

## 2020-08-24 RX ORDER — DIPHENHYDRAMINE HYDROCHLORIDE 50 MG/ML
50 INJECTION INTRAMUSCULAR; INTRAVENOUS ONCE
Status: CANCELLED | OUTPATIENT
Start: 2020-09-14

## 2020-08-24 RX ORDER — DIPHENHYDRAMINE HYDROCHLORIDE 50 MG/ML
50 INJECTION INTRAMUSCULAR; INTRAVENOUS ONCE
Status: CANCELLED | OUTPATIENT
Start: 2020-12-07

## 2020-08-24 RX ORDER — SODIUM CHLORIDE 9 MG/ML
INJECTION, SOLUTION INTRAVENOUS CONTINUOUS
Status: CANCELLED | OUTPATIENT
Start: 2020-12-21

## 2020-08-24 RX ORDER — EPINEPHRINE 1 MG/ML
0.3 INJECTION, SOLUTION, CONCENTRATE INTRAVENOUS PRN
Status: CANCELLED | OUTPATIENT
Start: 2020-10-12

## 2020-08-24 RX ORDER — HEPARIN SODIUM (PORCINE) LOCK FLUSH IV SOLN 100 UNIT/ML 100 UNIT/ML
500 SOLUTION INTRAVENOUS PRN
Status: CANCELLED | OUTPATIENT
Start: 2020-12-07

## 2020-08-24 RX ORDER — METHYLPREDNISOLONE SODIUM SUCCINATE 125 MG/2ML
125 INJECTION, POWDER, LYOPHILIZED, FOR SOLUTION INTRAMUSCULAR; INTRAVENOUS ONCE
Status: CANCELLED | OUTPATIENT
Start: 2020-10-12

## 2020-08-24 RX ORDER — METHYLPREDNISOLONE SODIUM SUCCINATE 125 MG/2ML
125 INJECTION, POWDER, LYOPHILIZED, FOR SOLUTION INTRAMUSCULAR; INTRAVENOUS ONCE
Status: CANCELLED | OUTPATIENT
Start: 2020-12-14

## 2020-08-24 RX ORDER — OXYCODONE HYDROCHLORIDE AND ACETAMINOPHEN 5; 325 MG/1; MG/1
TABLET ORAL
COMMUNITY
Start: 2018-10-15 | End: 2020-08-24 | Stop reason: SDUPTHER

## 2020-08-24 RX ORDER — SODIUM CHLORIDE 0.9 % (FLUSH) 0.9 %
10 SYRINGE (ML) INJECTION PRN
Status: CANCELLED | OUTPATIENT
Start: 2020-12-07

## 2020-08-24 RX ORDER — EPINEPHRINE 1 MG/ML
0.3 INJECTION, SOLUTION, CONCENTRATE INTRAVENOUS PRN
Status: CANCELLED | OUTPATIENT
Start: 2020-08-24

## 2020-08-24 RX ORDER — SODIUM CHLORIDE 0.9 % (FLUSH) 0.9 %
5 SYRINGE (ML) INJECTION PRN
Status: CANCELLED | OUTPATIENT
Start: 2020-11-23

## 2020-08-24 RX ORDER — SODIUM CHLORIDE 9 MG/ML
20 INJECTION, SOLUTION INTRAVENOUS ONCE
Status: CANCELLED | OUTPATIENT
Start: 2020-12-07

## 2020-08-24 RX ORDER — SODIUM CHLORIDE 0.9 % (FLUSH) 0.9 %
5 SYRINGE (ML) INJECTION PRN
Status: CANCELLED | OUTPATIENT
Start: 2020-12-07

## 2020-08-24 RX ORDER — SODIUM CHLORIDE 0.9 % (FLUSH) 0.9 %
5 SYRINGE (ML) INJECTION PRN
Status: CANCELLED | OUTPATIENT
Start: 2020-08-31

## 2020-08-24 RX ORDER — ONDANSETRON 2 MG/ML
8 INJECTION INTRAMUSCULAR; INTRAVENOUS ONCE
Status: CANCELLED
Start: 2020-11-16

## 2020-08-24 RX ORDER — SODIUM CHLORIDE 9 MG/ML
INJECTION, SOLUTION INTRAVENOUS CONTINUOUS
Status: CANCELLED | OUTPATIENT
Start: 2020-11-16

## 2020-08-24 RX ORDER — ONDANSETRON 2 MG/ML
8 INJECTION INTRAMUSCULAR; INTRAVENOUS ONCE
Status: CANCELLED
Start: 2020-08-24

## 2020-08-24 RX ORDER — SODIUM CHLORIDE 0.9 % (FLUSH) 0.9 %
5 SYRINGE (ML) INJECTION PRN
Status: CANCELLED | OUTPATIENT
Start: 2020-09-21

## 2020-08-24 RX ORDER — SODIUM CHLORIDE 0.9 % (FLUSH) 0.9 %
5 SYRINGE (ML) INJECTION PRN
Status: CANCELLED | OUTPATIENT
Start: 2020-10-19

## 2020-08-24 RX ORDER — SODIUM CHLORIDE 0.9 % (FLUSH) 0.9 %
10 SYRINGE (ML) INJECTION PRN
Status: CANCELLED | OUTPATIENT
Start: 2020-10-19

## 2020-08-24 RX ORDER — ONDANSETRON 2 MG/ML
8 INJECTION INTRAMUSCULAR; INTRAVENOUS ONCE
Status: CANCELLED
Start: 2020-11-23

## 2020-08-24 RX ORDER — SODIUM CHLORIDE 9 MG/ML
20 INJECTION, SOLUTION INTRAVENOUS ONCE
Status: CANCELLED | OUTPATIENT
Start: 2020-10-26

## 2020-08-24 RX ORDER — SODIUM CHLORIDE 9 MG/ML
20 INJECTION, SOLUTION INTRAVENOUS ONCE
Status: CANCELLED | OUTPATIENT
Start: 2020-11-09

## 2020-08-24 RX ORDER — SODIUM CHLORIDE 9 MG/ML
INJECTION, SOLUTION INTRAVENOUS CONTINUOUS
Status: CANCELLED | OUTPATIENT
Start: 2020-08-31

## 2020-08-24 RX ORDER — SODIUM CHLORIDE 9 MG/ML
INJECTION, SOLUTION INTRAVENOUS CONTINUOUS
Status: CANCELLED | OUTPATIENT
Start: 2020-08-24

## 2020-08-24 RX ORDER — SODIUM CHLORIDE 9 MG/ML
20 INJECTION, SOLUTION INTRAVENOUS ONCE
Status: CANCELLED | OUTPATIENT
Start: 2020-09-28

## 2020-08-24 RX ORDER — SODIUM CHLORIDE 9 MG/ML
20 INJECTION, SOLUTION INTRAVENOUS ONCE
Status: CANCELLED | OUTPATIENT
Start: 2020-12-21

## 2020-08-24 RX ORDER — SODIUM CHLORIDE 0.9 % (FLUSH) 0.9 %
5 SYRINGE (ML) INJECTION PRN
Status: CANCELLED | OUTPATIENT
Start: 2020-11-16

## 2020-08-24 RX ORDER — SODIUM CHLORIDE 0.9 % (FLUSH) 0.9 %
10 SYRINGE (ML) INJECTION PRN
Status: CANCELLED | OUTPATIENT
Start: 2020-11-23

## 2020-08-24 RX ORDER — HEPARIN SODIUM (PORCINE) LOCK FLUSH IV SOLN 100 UNIT/ML 100 UNIT/ML
500 SOLUTION INTRAVENOUS PRN
Status: CANCELLED | OUTPATIENT
Start: 2020-10-12

## 2020-08-24 RX ORDER — ONDANSETRON 2 MG/ML
8 INJECTION INTRAMUSCULAR; INTRAVENOUS ONCE
Status: CANCELLED
Start: 2020-09-14

## 2020-08-24 RX ORDER — SODIUM CHLORIDE 0.9 % (FLUSH) 0.9 %
5 SYRINGE (ML) INJECTION PRN
Status: CANCELLED | OUTPATIENT
Start: 2020-12-21

## 2020-08-24 RX ORDER — SODIUM CHLORIDE 0.9 % (FLUSH) 0.9 %
10 SYRINGE (ML) INJECTION PRN
Status: CANCELLED | OUTPATIENT
Start: 2020-09-28

## 2020-08-24 RX ORDER — HEPARIN SODIUM (PORCINE) LOCK FLUSH IV SOLN 100 UNIT/ML 100 UNIT/ML
500 SOLUTION INTRAVENOUS PRN
Status: CANCELLED | OUTPATIENT
Start: 2020-11-23

## 2020-08-24 RX ORDER — SODIUM CHLORIDE 0.9 % (FLUSH) 0.9 %
10 SYRINGE (ML) INJECTION PRN
Status: CANCELLED | OUTPATIENT
Start: 2020-12-14

## 2020-08-24 RX ORDER — DIPHENHYDRAMINE HYDROCHLORIDE 50 MG/ML
50 INJECTION INTRAMUSCULAR; INTRAVENOUS ONCE
Status: CANCELLED | OUTPATIENT
Start: 2020-08-31

## 2020-08-24 RX ORDER — SODIUM CHLORIDE 9 MG/ML
INJECTION, SOLUTION INTRAVENOUS CONTINUOUS
Status: CANCELLED | OUTPATIENT
Start: 2020-09-28

## 2020-08-24 RX ORDER — SODIUM CHLORIDE 0.9 % (FLUSH) 0.9 %
5 SYRINGE (ML) INJECTION PRN
Status: CANCELLED | OUTPATIENT
Start: 2020-09-14

## 2020-08-24 RX ORDER — METHYLPREDNISOLONE SODIUM SUCCINATE 125 MG/2ML
125 INJECTION, POWDER, LYOPHILIZED, FOR SOLUTION INTRAMUSCULAR; INTRAVENOUS ONCE
Status: CANCELLED | OUTPATIENT
Start: 2020-11-16

## 2020-08-24 RX ORDER — SODIUM CHLORIDE 9 MG/ML
INJECTION, SOLUTION INTRAVENOUS CONTINUOUS
Status: CANCELLED | OUTPATIENT
Start: 2020-10-26

## 2020-08-24 RX ORDER — HEPARIN SODIUM (PORCINE) LOCK FLUSH IV SOLN 100 UNIT/ML 100 UNIT/ML
500 SOLUTION INTRAVENOUS PRN
Status: CANCELLED | OUTPATIENT
Start: 2020-11-09

## 2020-08-24 RX ORDER — HEPARIN SODIUM (PORCINE) LOCK FLUSH IV SOLN 100 UNIT/ML 100 UNIT/ML
500 SOLUTION INTRAVENOUS PRN
Status: CANCELLED | OUTPATIENT
Start: 2020-09-28

## 2020-08-24 RX ORDER — DIPHENHYDRAMINE HYDROCHLORIDE 50 MG/ML
50 INJECTION INTRAMUSCULAR; INTRAVENOUS ONCE
Status: CANCELLED | OUTPATIENT
Start: 2020-09-21

## 2020-08-24 RX ORDER — ONDANSETRON 2 MG/ML
8 INJECTION INTRAMUSCULAR; INTRAVENOUS ONCE
Status: CANCELLED
Start: 2020-10-19

## 2020-08-24 RX ORDER — HEPARIN SODIUM (PORCINE) LOCK FLUSH IV SOLN 100 UNIT/ML 100 UNIT/ML
500 SOLUTION INTRAVENOUS PRN
Status: CANCELLED | OUTPATIENT
Start: 2020-12-21

## 2020-08-24 RX ORDER — METHYLPREDNISOLONE SODIUM SUCCINATE 125 MG/2ML
125 INJECTION, POWDER, LYOPHILIZED, FOR SOLUTION INTRAMUSCULAR; INTRAVENOUS ONCE
Status: CANCELLED | OUTPATIENT
Start: 2020-11-09

## 2020-08-24 RX ORDER — METHYLPREDNISOLONE SODIUM SUCCINATE 125 MG/2ML
125 INJECTION, POWDER, LYOPHILIZED, FOR SOLUTION INTRAMUSCULAR; INTRAVENOUS ONCE
Status: CANCELLED | OUTPATIENT
Start: 2020-11-23

## 2020-08-24 RX ORDER — EPINEPHRINE 1 MG/ML
0.3 INJECTION, SOLUTION, CONCENTRATE INTRAVENOUS PRN
Status: CANCELLED | OUTPATIENT
Start: 2020-12-14

## 2020-08-24 RX ORDER — HEPARIN SODIUM (PORCINE) LOCK FLUSH IV SOLN 100 UNIT/ML 100 UNIT/ML
500 SOLUTION INTRAVENOUS PRN
Status: CANCELLED | OUTPATIENT
Start: 2020-08-31

## 2020-08-24 RX ORDER — SODIUM CHLORIDE 0.9 % (FLUSH) 0.9 %
10 SYRINGE (ML) INJECTION PRN
Status: CANCELLED | OUTPATIENT
Start: 2020-09-21

## 2020-08-24 RX ORDER — SODIUM CHLORIDE 0.9 % (FLUSH) 0.9 %
10 SYRINGE (ML) INJECTION PRN
Status: CANCELLED | OUTPATIENT
Start: 2020-11-16

## 2020-08-24 RX ORDER — DIPHENHYDRAMINE HYDROCHLORIDE 50 MG/ML
50 INJECTION INTRAMUSCULAR; INTRAVENOUS ONCE
Status: CANCELLED | OUTPATIENT
Start: 2020-09-28

## 2020-08-24 RX ORDER — SODIUM CHLORIDE 9 MG/ML
INJECTION, SOLUTION INTRAVENOUS CONTINUOUS
Status: CANCELLED | OUTPATIENT
Start: 2020-09-21

## 2020-08-24 RX ORDER — SODIUM CHLORIDE 0.9 % (FLUSH) 0.9 %
10 SYRINGE (ML) INJECTION PRN
Status: CANCELLED | OUTPATIENT
Start: 2020-08-24

## 2020-08-24 RX ORDER — ONDANSETRON 2 MG/ML
8 INJECTION INTRAMUSCULAR; INTRAVENOUS ONCE
Status: CANCELLED
Start: 2020-12-14

## 2020-08-24 RX ORDER — SODIUM CHLORIDE 0.9 % (FLUSH) 0.9 %
5 SYRINGE (ML) INJECTION PRN
Status: CANCELLED | OUTPATIENT
Start: 2020-10-26

## 2020-08-24 RX ORDER — METHYLPREDNISOLONE SODIUM SUCCINATE 125 MG/2ML
125 INJECTION, POWDER, LYOPHILIZED, FOR SOLUTION INTRAMUSCULAR; INTRAVENOUS ONCE
Status: CANCELLED | OUTPATIENT
Start: 2020-09-21

## 2020-08-24 RX ORDER — EPINEPHRINE 1 MG/ML
0.3 INJECTION, SOLUTION, CONCENTRATE INTRAVENOUS PRN
Status: CANCELLED | OUTPATIENT
Start: 2020-09-28

## 2020-08-24 RX ORDER — METHYLPREDNISOLONE SODIUM SUCCINATE 125 MG/2ML
125 INJECTION, POWDER, LYOPHILIZED, FOR SOLUTION INTRAMUSCULAR; INTRAVENOUS ONCE
Status: CANCELLED | OUTPATIENT
Start: 2020-10-19

## 2020-08-24 RX ORDER — ONDANSETRON 2 MG/ML
8 INJECTION INTRAMUSCULAR; INTRAVENOUS ONCE
Status: CANCELLED
Start: 2020-09-21

## 2020-08-24 NOTE — TELEPHONE ENCOUNTER
Patient called to have a refill on zofran and percocet. Send to [facility] on [address].     Thank you,    Herrera Souza

## 2020-08-25 RX ORDER — OXYCODONE HYDROCHLORIDE AND ACETAMINOPHEN 5; 325 MG/1; MG/1
1 TABLET ORAL EVERY 4 HOURS PRN
Qty: 60 TABLET | Refills: 0 | Status: SHIPPED | OUTPATIENT
Start: 2020-08-25 | End: 2020-09-04

## 2020-08-25 RX ORDER — ONDANSETRON 8 MG/1
8 TABLET, ORALLY DISINTEGRATING ORAL PRN
Qty: 30 TABLET | Refills: 3 | Status: SHIPPED | OUTPATIENT
Start: 2020-08-25 | End: 2020-10-27 | Stop reason: SDUPTHER

## 2020-08-31 ENCOUNTER — TELEPHONE (OUTPATIENT)
Dept: ONCOLOGY | Age: 65
End: 2020-08-31

## 2020-08-31 NOTE — TELEPHONE ENCOUNTER
Spoke with patient's  (JACOB) about her CT scan on 09.14.2020, I am going to try and switch it to a later date due to patient going out of town. Got scans moved to 09.18.2020 arr 0900 at [facility]. Went over prep and he stated understanding.

## 2020-09-08 RX ORDER — OXYCODONE HYDROCHLORIDE AND ACETAMINOPHEN 5; 325 MG/1; MG/1
1 TABLET ORAL EVERY 4 HOURS PRN
Qty: 60 TABLET | Refills: 0 | Status: SHIPPED | OUTPATIENT
Start: 2020-09-08 | End: 2020-09-13

## 2020-09-11 PROBLEM — E86.0 DEHYDRATION: Status: RESOLVED | Noted: 2020-08-12 | Resolved: 2020-09-11

## 2020-09-15 NOTE — PROGRESS NOTES
Patient Name: Den Holcomb  Patient : 1955  Patient MRN: [id number]     Primary Oncologist: Ryan Valerio MD  Referring Provider: Belem English MD     Date of Service: 2020      Chief Complaint:   Chief Complaint   Patient presents with    Follow-up     Patient Active Problem List:     Malignant neoplasm of upper lobe of right lung      Secondary malignant neoplasm of brain stem    HPI:   Mr. 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 She stated that she was first diagnosed with right upper lobe superior sulcus non-small cell lung cancer in  and she was treated with neoadjuvant concurrent chemoradiation therapy followed by right thoracotomy, right upper lobe lobectomy with en bloc resection of first and second ribs on 2002 by Dr. Reid Chua. She has been followed closely since then. She has PET CT scan in 2017 which showed only pleural nodule and the positive lymph node. At that time, decision was made to follow her Lesion closely. Repeat scan in 2017 showed progression of nodes and the pleural-based lesion. She subsequently had EBUS and biopsy on 2017. She was found to have non-small cell carcinoma. However, tissue was insufficient for markers and PDL 1.     She had to repeat biopsy on 2017 and the final pathology revealed poorly malignancy. Finding is new since March 2018. Consider dedicated MRI with Eovist contrast or PET/CT. Subtle ill-defined hypodense mass in segment 7. This finding is less specific and may represent a hemangioma versus metastatic disease. Consider dedicated MRI with Eovist contrast or PET/CT. Probable new subtle superior endplate deformity of T6. No obvious underlying osseous metastasis. Moderate emphysema. PET/CT scan done on 83/50/59 showed metabolically active hepatic metastases. Mild metabolic activity associated with medial right lung airspace consolidation and volume loss, likely post treatment changes. Mild metabolic activity associated with T6 compression fracture, no evidence of underlying metastasis. Since she is found to have metastatic non-small cell lung cancer, first-line chemotherapy with carboplatin and etoposide was started on November 12, 2018. CT scan of the soft tissue neck, chest, abdomen and pelvis done on 2/11/19 showed no metastatic disease in neck. Patient's known liver metastases are not appreciated on today's CT scan, compatible with response to treatment. Increased sclerosis involving the compression deformity of T6. Finding is highly suspicious for pathologic fracture related to metastatic disease. No additional osseous metastases are seen. Post treatment changes identified in the right perihilar region. No CT evidence of local recurrence. Severe atherosclerosis. Cholecystectomy and hysterectomy. MRI of the brain done on 3/21/19 at [facility] was normal according to her. They are planning to have repeat MRI brain and MRI thoracic spine in three months. CT scan of the soft tissue neck, chest, abdomen and pelvis done on May 16, 2019 showed no new or worsening metastatic disease to the chest, abdomen and pelvis. No evidence of metastatic disease to the liver.  Stable compression deformity with sclerosis involving T6 vertebral body which could reflect pathologic fracture from body disease and we started first-line chemotherapy with carboplatin and etoposide since November 12, 2018. After first cycle, we switched her chemotherapy to cisplatin and etoposide since she prefers to have cisplatin instead. However, she develops significant tiredness, fatigue and she has been wheel chair bound for about a week after one cycle of chemotherapy with cisplatin and etoposide. Her chemo was switched back to carboplatin and etoposide. She completed total six cycles of chemotherapy on 3/1/19. We started maintenance chemotherapy with atezolizumab since 3/20/19. We stopped it on 8/14/19 since she has progression of the disease. Since CT scan done on 8/27/19 showed progression of the disease, we changed her chemotherapy to second line chemotherapy with docetaxel and ramucirumab. It was started on September 23, 2019. CT scan of the soft tissue neck, chest, abdomen and pelvis done on March 9, 2020 showed at least some stable disease to improvement. After 10th cycle of chemo with doxetaxel and ramucirumab, she becomes more debility due to side effects from chemotherapy. She also developed c diff colitis. We decided to stop docetaxel and ramucirumab completely. She has been on chemoholiday since she developed c diff colitis. We discussed different options, including to continue with current chemo or to change it to different regimen since she has difficulty tolerating it well. She has question about her liver metastasis and she wants to know exactly what type of cancer it is. Final pathology from liver biopsy was consistent with metastatic non-small cell lung cancer. Foundation one testing was requested and it showed no reportable alterations. Since she is feeling better now, we decided to resume chemotherapy. I recommend to start single agent gemcitabine and she started her first chemo on 7/14/20. She feels weak and tired after chemotherapy.  She has decided to hold chemo after first cycle and she has been off chemo until now. She continues to feel weak, tired and increasing pain. Her performance status is not getting better. I reviewed with her findings of CT scans. She does not want to continue with antiestrogen the chemotherapy at this moment. She is also not ready for hospice and she want to continue with palliative care only at this moment. I believe it is reasonable and I will continue with conservative management at this moment. Since her pain is not controlled well with current pain medication, I will change her pain medication to oxycodone 10 mg every 4 hourly as needed basis. I will reevaluate her again in the 4 weeks. She and her  will let me know whenever she is ready for hospice. She does not have any other significant symptoms at today's visit. Past Medical History  Significant for  1. Gastroesophageal reflux disease  2. Hypothyroidism    Surgical History    Significant for  1. Cholecystectomy  2. Hysterectomy  3. Pilonidal cyst excision    Allergies  Augmentin  Linzess  Zoloft  codeine    Social History  She was a former smoker and she quit smoking in March 7, 2002. She used to smoke 1 pack per day for approximately 30 years. She denies alcohol abuse and illicit drug abuse. She is currently living in [facility]. Family History  Significant for prostate cancer in one of her brother, esophageal cancer in another brother. Her maternal aunt has uterine cancer and maternal grandmother has esophageal cancer. No other pertinent family history. Review of Systems: \"Per interval history; otherwise 10 point ROS is negative. \"  Her energy level is low, appetite, and sleep are okay. No fever, chills, night sweats, cough, shortness of breath, chest pain, hemoptysis or palpitations. Her bowel and bladder functions are normal. She deniesnausea, vomiting, abdominal pain, diarrhea, constipation, dysuria, loss of appetite or weight loss.  No B2M  Coagulation Panel:  Lab Results   Component Value Date    PROTIME 12.1 06/03/2020    INR 1.00 06/03/2020    APTT 36.7 06/03/2020     Anemia Panel:  Lab Results   Component Value Date    IRTAVAXD94 0174 (H) 03/13/2020    FOLATE >20.0 (H) 03/13/2020     Tumor Markers:  No results found for: , CEA, , LABCA2, PSA  Observations:  No data recorded      Assessment & Plan:   1. Stage IIIA right lung non small cell lung cancer - s/p concurrent chemoradiation therapy  2. History of superior sulcus non small cell lung cancer - s/p neoadjuvant chemoradiation followed by surgery in 2002. 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 Elvira Rebolledo is feeling well and she does not have any significant new symptoms on today's visit. 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 PET/CT scan done on October 20, 2018 showed metastatic disease and we started first-line chemotherapy with carboplatin and etoposide since November 12, 2018. After first cycle, we switched her chemotherapy to cisplatin and etoposide since she prefers to have cisplatin instead. However, she develops significant tiredness, fatigue and she has been wheel chair bound for about a week after one cycle of chemotherapy with cisplatin and etoposide. Her chemo was switched back to carboplatin and etoposide. She completed total six cycles of chemotherapy on 3/1/19. We started maintenance chemotherapy with atezolizumab since 3/20/19.  We stopped it on 8/14/19 since she has progression of the disease. Since CT scan done on 8/27/19 showed progression of the disease, we changed her chemotherapy to second line chemotherapy with docetaxel and ramucirumab. It was started on September 23, 2019. CT scan of the soft tissue neck, chest, abdomen and pelvis done on March 9, 2020 showed at least some stable disease to improvement. After 10th cycle of chemo with doxetaxel and ramucirumab, she becomes more debility due to side effects from chemotherapy. She also developed c diff colitis. We decided to stop docetaxel and ramucirumab completely. She has been on chemoholiday since she developed c diff colitis. We discussed different options, including to continue with current chemo or to change it to different regimen since she has difficulty tolerating it well. She has question about her liver metastasis and she wants to know exactly what type of cancer it is. Final pathology from liver biopsy was consistent with metastatic non-small cell lung cancer. Foundation one testing was requested and it showed no reportable alterations. Since she is feeling better now, we decided to resume chemotherapy. I recommend to start single agent gemcitabine and she started her first chemo on 7/14/20. She feels weak and tired after chemotherapy. She has decided to hold chemo after first cycle and she has been off chemo until now. She continues to feel weak, tired and increasing pain. Her performance status is not getting better. I reviewed with her findings of CT scans. She does not want to continue with antiestrogen the chemotherapy at this moment. She is also not ready for hospice and she want to continue with palliative care only at this moment. I believe it is reasonable and I will continue with conservative management at this moment.   Since her pain is not controlled well with current pain medication, I will change her pain medication to oxycodone 10 mg every 4 hourly as needed basis.    I will reevaluate her again in the 4 weeks. She and her  will let me know whenever she is ready for hospice. I have reviewed all these plans with her and her  and they are in agreement with the plans. I answered all their questions and concerns for today. I asked her to follow-up with primary care physician on regular basis and I will continue to keep you update on her progress. Thank you for allowing me to participate in the care of this very pleasant patient. Recent imaging and labs were reviewed and discussed with the patient.       Electronically signed by Jenny Felix MD on 9/15/20 at 9:02 AM EDT

## 2020-09-18 ENCOUNTER — HOSPITAL ENCOUNTER (OUTPATIENT)
Dept: CT IMAGING | Age: 65
Discharge: HOME | End: 2020-09-18
Payer: COMMERCIAL

## 2020-09-18 PROCEDURE — 71260 CT THORAX DX C+: CPT

## 2020-09-18 PROCEDURE — 74177 CT ABD & PELVIS W/CONTRAST: CPT

## 2020-09-18 PROCEDURE — 6360000004 HC RX CONTRAST MEDICATION: Performed by: INTERNAL MEDICINE

## 2020-09-18 PROCEDURE — 2580000003 HC RX 258: Performed by: INTERNAL MEDICINE

## 2020-09-18 PROCEDURE — 70491 CT SOFT TISSUE NECK W/DYE: CPT

## 2020-09-18 RX ORDER — SODIUM CHLORIDE 0.9 % (FLUSH) 0.9 %
10 SYRINGE (ML) INJECTION PRN
Status: DISCONTINUED | OUTPATIENT
Start: 2020-09-18 | End: 2020-09-19 | Stop reason: HOSPADM

## 2020-09-18 RX ADMIN — IOHEXOL 50 ML: 240 INJECTION, SOLUTION INTRATHECAL; INTRAVASCULAR; INTRAVENOUS; ORAL at 09:30

## 2020-09-18 RX ADMIN — IOPAMIDOL 75 ML: 755 INJECTION, SOLUTION INTRAVENOUS at 10:45

## 2020-09-18 RX ADMIN — SODIUM CHLORIDE, PRESERVATIVE FREE 10 ML: 5 INJECTION INTRAVENOUS at 10:45

## 2020-09-21 ENCOUNTER — TELEPHONE (OUTPATIENT)
Dept: ONCOLOGY | Age: 65
End: 2020-09-21

## 2020-09-21 RX ORDER — OXYCODONE HYDROCHLORIDE AND ACETAMINOPHEN 5; 325 MG/1; MG/1
2 TABLET ORAL EVERY 6 HOURS PRN
Qty: 96 TABLET | Refills: 0 | Status: SHIPPED | OUTPATIENT
Start: 2020-09-21 | End: 2020-10-05

## 2020-09-21 NOTE — TELEPHONE ENCOUNTER
Pt's  called to state that pt is so weak and in so much pain that he doesn't think she can make it to the apt tomorrow. The right arm, shoulder, and back are so affected that she can't even wash her hands. She is very emotional and discouraged. They would like to have a virtual with Dr Dorcas Kussmaul tomorrow. DR Dorcas Kussmaul consulted and agreed to a virtual visit. He also agreed to increase dose of pain medication to 1 to 2 tablets as needed.

## 2020-09-22 ENCOUNTER — TELEPHONE (OUTPATIENT)
Dept: ONCOLOGY | Age: 65
End: 2020-09-22

## 2020-09-22 ENCOUNTER — OFFICE VISIT (OUTPATIENT)
Dept: ONCOLOGY | Age: 65
End: 2020-09-22
Payer: COMMERCIAL

## 2020-09-22 PROCEDURE — 99213 OFFICE O/P EST LOW 20 MIN: CPT | Performed by: INTERNAL MEDICINE

## 2020-09-22 NOTE — TELEPHONE ENCOUNTER
Per Dr Bard Chen, spoke with pharmacy and verified that pt should receive the oxycodone regular tablets and the percocet should not be filled. Pharmacy expressed understanding.

## 2020-09-22 NOTE — PROGRESS NOTES
MA Rooming Questions  Patient: Cesar Mohr  MRN: [id number]    Date: 9/22/2020        1. Do you have any new issues? yes - port hasnt been flushed in three months, Talked to April yesterday about increasing pain meds. 2. Do you need any refills on medications? yes - pain med    3. Have you had any imaging done since your last visit? yes - CT    4. Have you been hospitalized or seen in the emergency room since your last visit here?   no    5. Did the patient have a depression screening completed today?  No    No data recorded     PHQ-9 Given to (if applicable):               PHQ-9 Score (if applicable):                     [] Positive     []  Negative              Does question #9 need addressed (if applicable)                     [] Yes    []  No               Jah Zepeda MA

## 2020-09-25 ENCOUNTER — TELEPHONE (OUTPATIENT)
Dept: ONCOLOGY | Age: 65
End: 2020-09-25

## 2020-09-25 RX ORDER — OXYCODONE AND ACETAMINOPHEN 10; 325 MG/1; MG/1
1 TABLET ORAL EVERY 4 HOURS PRN
Qty: 84 TABLET | Refills: 0 | Status: SHIPPED | OUTPATIENT
Start: 2020-09-25 | End: 2020-10-27 | Stop reason: SDUPTHER

## 2020-09-25 NOTE — TELEPHONE ENCOUNTER
Spouse called to report that patient is taking Oxycodone 5 mg every 2 hours without relief. He states that patient was up most of the previous night and is not getting any rest.  States that she is also taking ASA. Informed Dr John Walker and he orders Percocet 10/325 mg every 4 hours prn pain. Rx sent to pharmacy. Informed spouse to have patient stop Oxycodone and to change to Percocet 10/325 mg every 4 hours prn pain. Instructed to not take a whole tablet every 2 hours and discussed the amount of acetaminophen and discussed not to exceed by taking more frequently. Instructed that she may take 1/2 tablet every 2 hours if she prefers. Spouse verbalized understanding of order and directions. He had also requested Fentanyl topical patch. Dr John Walker states instructed to have spouse call back on Monday and determine if her pain is managed. Then, he may order a long acting pain medication. Spouse verbalized understanding of plan of care. Instructed to call back with any other concerns or questions.

## 2020-10-25 NOTE — PROGRESS NOTES
Patient Name: Darius Corea  Patient : 1955  Patient MRN: [id number]     Primary Oncologist: Ernesto Nayak MD  Referring Provider: Manuel Hargrove MD     Date of Service: 10/27/2020      Chief Complaint:   Chief Complaint   Patient presents with    Follow-up     Patient Active Problem List:     Malignant neoplasm of upper lobe of right lung      Secondary malignant neoplasm of brain stem    HPI:   Mr. Hilton Segal is a 71-year-old very pleasant patient with medical history significant for gastroesophageal reflux disease, hypothyroidismand history of right superior sulcus non small cell lung cancer, , status post neoadjuvant chemoradiation therapy followed by right thoracotomy, right upper lobe lobectomy with en bloc resection of first and second ribs on 2002 by Dr. Marie Muhammad, stage IIIA non-small cell lung cancer, status post concurrent chemoradiation therapy, and adjuvant chemotherapy completed therapy in , brain stem (thu) metastasis status post stereotactic body radiation therapy, initially referred to me on 2018 for evaluation of her lung cancer. She stated that she was first diagnosed with right upper lobe superior sulcus non-small cell lung cancer in  and she was treated with neoadjuvant concurrent chemoradiation therapy followed by right thoracotomy, right upper lobe lobectomy with en bloc resection of first and second ribs on 2002 by Dr. Marie Muhammad. She has been followed closely since then. She has PET CT scan in 2017 which showed only pleural nodule and the positive lymph node. At that time, decision was made to follow her Lesion closely. Repeat scan in 2017 showed progression of nodes and the pleural-based lesion. She subsequently had EBUS and biopsy on 2017. She was found to have non-small cell carcinoma. However, tissue was insufficient for markers and PDL 1.     She had to repeat biopsy on 2017 and the final pathology revealed poorly malignancy. Finding is new since March 2018. Consider dedicated MRI with Eovist contrast or PET/CT. Subtle ill-defined hypodense mass in segment 7. This finding is less specific and may represent a hemangioma versus metastatic disease. Consider dedicated MRI with Eovist contrast or PET/CT. Probable new subtle superior endplate deformity of T6. No obvious underlying osseous metastasis. Moderate emphysema. PET/CT scan done on 13/92/05 showed metabolically active hepatic metastases. Mild metabolic activity associated with medial right lung airspace consolidation and volume loss, likely post treatment changes. Mild metabolic activity associated with T6 compression fracture, no evidence of underlying metastasis. Since she is found to have metastatic non-small cell lung cancer, first-line chemotherapy with carboplatin and etoposide was started on November 12, 2018. CT scan of the soft tissue neck, chest, abdomen and pelvis done on 2/11/19 showed no metastatic disease in neck. Patient's known liver metastases are not appreciated on today's CT scan, compatible with response to treatment. Increased sclerosis involving the compression deformity of T6. Finding is highly suspicious for pathologic fracture related to metastatic disease. No additional osseous metastases are seen. Post treatment changes identified in the right perihilar region. No CT evidence of local recurrence. Severe atherosclerosis. Cholecystectomy and hysterectomy. MRI of the brain done on 3/21/19 at [facility] was normal according to her. They are planning to have repeat MRI brain and MRI thoracic spine in three months. CT scan of the soft tissue neck, chest, abdomen and pelvis done on May 16, 2019 showed no new or worsening metastatic disease to the chest, abdomen and pelvis. No evidence of metastatic disease to the liver.  Stable compression deformity with sclerosis involving T6 vertebral body which could reflect pathologic fracture from body metastatic disease. No evidence of metastatic disease in her neck. CT scan of the soft tissue neck, chest, abdomen and pelvis done on 3/9/20 showed stable postsurgical changes and posttreatment changes within the right lung. Patchy areas of dependent and peripheral ground-glass attenuation, nonspecific and may be related to infection or inflammation and can be followed on subsequent imaging. Continued slight interval decrease in size of hepatic metastases. Otherwise stable exam with no new findings of metastatic disease in the chest, abdomen or pelvis. Moderate-to-severe atherosclerosis. 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 Slides form  that are available and reviewed. Inly a spindle cell component is present on the slides reviewed. The spindle cell components are similar, and TTF-1 being positive support the diagnosis]. Foundation one testing was requested and it showed no reportable alterations. CT scans done on 9/18/20 showed disease progression with multiple new and enlarged liver metastases compared to exam on March 9, 2020. On October 27, 2020, she presented to me for follow-up via telehealth. 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      PET/CT scan done on October 20, 2018 showed metastatic and she has been off chemo until now. She continues to feel weak, tired, nauseated and pain, but they have been quite stable. Her performance status is about the same like last visit. She can move back and forth to wheelchair, but she cannot ambulate properly yet. She does not want to continue with chemotherapy at this moment. She is also not ready for hospice and she want to continue with palliative care only at this moment. I believe it is reasonable and I will continue with conservative management at this moment. Since her pain is controlled well with current pain medication, I recommend her to continue with oxycodone/acetaminophen 10/325 mg every 4 hourly as needed basis. I also recommend to continue with zofran 8 mg ODT for nausea. I will reevaluate her again in 1 month. She and her  will let me know whenever she is ready for hospice. She does not have any other significant symptoms at today's visit. Past Medical History  Significant for  1. Gastroesophageal reflux disease  2. Hypothyroidism    Surgical History    Significant for  1. Cholecystectomy  2. Hysterectomy  3. Pilonidal cyst excision    Allergies  Augmentin  Linzess  Zoloft  codeine    Social History  She was a former smoker and she quit smoking in March 7, 2002. She used to smoke 1 pack per day for approximately 30 years. She denies alcohol abuse and illicit drug abuse. She is currently living in [facility]. Family History  Significant for prostate cancer in one of her brother, esophageal cancer in another brother. Her maternal aunt has uterine cancer and maternal grandmother has esophageal cancer. No other pertinent family history. Review of Systems: \"Per interval history; otherwise 10 point ROS is negative. \"  Her energy level is still low, but stable, appetite, and sleep are about the same.  Denies fever, chills, night sweats, cough, shortness of breath, chest pain, hemoptysis or 1.61 03/13/2020     Immunology:  Lab Results   Component Value Date    PROT 6.0 (L) 07/21/2020     No results found for: Jenn Show, KLFLCR  No results found for: B2M  Coagulation Panel:  Lab Results   Component Value Date    PROTIME 12.1 06/03/2020    INR 1.00 06/03/2020    APTT 36.7 06/03/2020     Anemia Panel:  Lab Results   Component Value Date    NFNBPZCS01 2552 (H) 03/13/2020    FOLATE >20.0 (H) 03/13/2020     Tumor Markers:  No results found for: , CEA, , LABCA2, PSA  Observations:  No data recorded      Assessment & Plan:   1. Stage IIIA right lung non small cell lung cancer - s/p concurrent chemoradiation therapy  2. History of superior sulcus non small cell lung cancer - s/p neoadjuvant chemoradiation followed by surgery in 2002. 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 Vincent Goodell is feeling well and she does not have any significant new symptoms on today's visit. On October 27, 2020, she presented to me for follow-up via telehealth. 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 PET/CT scan done on October 20, 2018 showed metastatic disease and we started first-line chemotherapy with carboplatin and etoposide since November 12, 2018. After first cycle, we switched her chemotherapy to cisplatin and etoposide since she prefers to have cisplatin instead.  However, she develops significant tiredness, fatigue and she has been wheel chair bound for about a week after one cycle of chemotherapy with cisplatin and etoposide. Her chemo was switched back to carboplatin and etoposide. She completed total six cycles of chemotherapy on 3/1/19. We started maintenance chemotherapy with atezolizumab since 3/20/19. We stopped it on 8/14/19 since she has progression of the disease. Since CT scan done on 8/27/19 showed progression of the disease, we changed her chemotherapy to second line chemotherapy with docetaxel and ramucirumab. It was started on September 23, 2019. CT scan of the soft tissue neck, chest, abdomen and pelvis done on March 9, 2020 showed at least some stable disease to improvement. After 10th cycle of chemo with doxetaxel and ramucirumab, she becomes more debility due to side effects from chemotherapy. She also developed c diff colitis. We decided to stop docetaxel and ramucirumab completely. She has been on chemoholiday since she developed c diff colitis. We discussed different options, including to continue with current chemo or to change it to different regimen since she has difficulty tolerating it well. She has question about her liver metastasis and she wants to know exactly what type of cancer it is. Final pathology from liver biopsy was consistent with metastatic non-small cell lung cancer. Foundation one testing was requested and it showed no reportable alterations. Since she is feeling better now, we decided to resume chemotherapy. I recommend to start single agent gemcitabine and she started her first chemo on 7/14/20. She feels weak and tired after chemotherapy. She has decided to hold chemo after first cycle and she has been off chemo until now. She continues to feel weak, tired, nauseated and pain, but they have been quite stable. Her performance status is about the same like last visit. She can move back and forth to wheelchair, but she cannot ambulate properly yet. She does not want to continue with chemotherapy at this moment.   She is also not ready for hospice and she want to continue with palliative care only at this moment. I believe it is reasonable and I will continue with conservative management at this moment. Since her pain is controlled well with current pain medication, I recommend her to continue with oxycodone/acetaminophen 10/325 mg every 4 hourly as needed basis. I also recommend to continue with zofran 8 mg ODT for nausea. I will reevaluate her again in 1 month. She and her  will let me know whenever she is ready for hospice. I have reviewed all these plans with her and her  and they are in agreement with the plans. I answered all their questions and concerns for today. I asked her to follow-up with primary care physician on regular basis. Thank you for allowing me to participate in the care of this very pleasant patient. Recent imaging and labs were reviewed and discussed with the patient.    Total time spent with patient - 11 minutes

## 2020-10-27 ENCOUNTER — OFFICE VISIT (OUTPATIENT)
Dept: ONCOLOGY | Age: 65
End: 2020-10-27
Payer: COMMERCIAL

## 2020-10-27 ENCOUNTER — HOSPITAL ENCOUNTER (OUTPATIENT)
Dept: INFUSION THERAPY | Age: 65
End: 2020-10-27
Payer: COMMERCIAL

## 2020-10-27 PROCEDURE — 99422 OL DIG E/M SVC 11-20 MIN: CPT | Performed by: INTERNAL MEDICINE

## 2020-10-27 RX ORDER — OXYCODONE AND ACETAMINOPHEN 10; 325 MG/1; MG/1
1 TABLET ORAL EVERY 4 HOURS PRN
Qty: 84 TABLET | Refills: 0 | Status: SHIPPED | OUTPATIENT
Start: 2020-10-27 | End: 2020-11-10

## 2020-10-27 RX ORDER — ONDANSETRON 8 MG/1
8 TABLET, ORALLY DISINTEGRATING ORAL PRN
Qty: 30 TABLET | Refills: 3 | Status: SHIPPED | OUTPATIENT
Start: 2020-10-27

## 2020-10-27 NOTE — PROGRESS NOTES
MA Rooming Questions  Patient: Inder Romero  MRN: [id number]    Date: 10/27/2020        1. Do you have any new issues?   no         2. Do you need any refills on medications? yes - Pain medication    3. Have you had any imaging done since your last visit?   no    4. Have you been hospitalized or seen in the emergency room since your last visit here?   no    5. Did the patient have a depression screening completed today?  No    No data recorded     PHQ-9 Given to (if applicable):               PHQ-9 Score (if applicable):                     [] Positive     []  Negative              Does question #9 need addressed (if applicable)                     [] Yes    []  No               Maria Guadalupe Araya MA

## 2020-11-02 ENCOUNTER — TELEPHONE (OUTPATIENT)
Dept: ONCOLOGY | Age: 65
End: 2020-11-02

## 2020-11-02 NOTE — TELEPHONE ENCOUNTER
Patient's  called asking about getting O2 for home, patient is having trouble getting her breath w/exertion, please call

## 2020-11-02 NOTE — TELEPHONE ENCOUNTER
NN returned call to pt's spouse. Pt will need O2 testing to qualify for home O2. He states that pt doesn't feel well enough to come out but doesn't have home health care. Will discuss options with Dr Emilie Henriquez.

## 2020-11-04 ENCOUNTER — TELEPHONE (OUTPATIENT)
Dept: ONCOLOGY | Age: 65
End: 2020-11-04

## 2020-11-04 NOTE — TELEPHONE ENCOUNTER
Returned call to pt's spouse. Pt does not want to pursue home health or oxygen at this time. Order for handicap placcard placed and will be mailed to patient.

## 2020-11-09 ENCOUNTER — TELEPHONE (OUTPATIENT)
Dept: ONCOLOGY | Age: 65
End: 2020-11-09

## 2020-11-09 NOTE — TELEPHONE ENCOUNTER
Patient  call and lvm that patient is not feeling well, having issues with swelling.    Please advise

## 2020-11-10 ENCOUNTER — HOSPITAL ENCOUNTER (OUTPATIENT)
Dept: INFUSION THERAPY | Age: 65
Discharge: HOME | End: 2020-11-10
Payer: COMMERCIAL

## 2020-11-10 ENCOUNTER — OFFICE VISIT (OUTPATIENT)
Dept: ONCOLOGY | Age: 65
End: 2020-11-10
Payer: COMMERCIAL

## 2020-11-10 VITALS
BODY MASS INDEX: 26.85 KG/M2 | RESPIRATION RATE: 16 BRPM | HEART RATE: 94 BPM | SYSTOLIC BLOOD PRESSURE: 127 MMHG | DIASTOLIC BLOOD PRESSURE: 68 MMHG | TEMPERATURE: 97.8 F | HEIGHT: 64 IN | OXYGEN SATURATION: 93 %

## 2020-11-10 PROCEDURE — 96127 BRIEF EMOTIONAL/BEHAV ASSMT: CPT | Performed by: INTERNAL MEDICINE

## 2020-11-10 PROCEDURE — 99214 OFFICE O/P EST MOD 30 MIN: CPT | Performed by: INTERNAL MEDICINE

## 2020-11-10 PROCEDURE — 99211 OFF/OP EST MAY X REQ PHY/QHP: CPT

## 2020-11-10 RX ORDER — SODIUM CHLORIDE 0.9 % (FLUSH) 0.9 %
10 SYRINGE (ML) INJECTION PRN
Status: CANCELLED | OUTPATIENT
Start: 2020-11-10

## 2020-11-10 RX ORDER — HEPARIN SODIUM (PORCINE) LOCK FLUSH IV SOLN 100 UNIT/ML 100 UNIT/ML
500 SOLUTION INTRAVENOUS PRN
Status: CANCELLED | OUTPATIENT
Start: 2020-11-10

## 2020-11-10 RX ORDER — SODIUM CHLORIDE 0.9 % (FLUSH) 0.9 %
5 SYRINGE (ML) INJECTION PRN
Status: CANCELLED | OUTPATIENT
Start: 2020-11-10

## 2020-11-10 RX ORDER — 0.9 % SODIUM CHLORIDE 0.9 %
1000 INTRAVENOUS SOLUTION INTRAVENOUS ONCE
Status: CANCELLED | OUTPATIENT
Start: 2020-11-10

## 2020-11-10 ASSESSMENT — PATIENT HEALTH QUESTIONNAIRE - PHQ9
9. THOUGHTS THAT YOU WOULD BE BETTER OFF DEAD, OR OF HURTING YOURSELF: 0
4. FEELING TIRED OR HAVING LITTLE ENERGY: 3
SUM OF ALL RESPONSES TO PHQ9 QUESTIONS 1 & 2: 3
SUM OF ALL RESPONSES TO PHQ QUESTIONS 1-9: 12
5. POOR APPETITE OR OVEREATING: 3
SUM OF ALL RESPONSES TO PHQ QUESTIONS 1-9: 12
2. FEELING DOWN, DEPRESSED OR HOPELESS: 3
8. MOVING OR SPEAKING SO SLOWLY THAT OTHER PEOPLE COULD HAVE NOTICED. OR THE OPPOSITE, BEING SO FIGETY OR RESTLESS THAT YOU HAVE BEEN MOVING AROUND A LOT MORE THAN USUAL: 0
6. FEELING BAD ABOUT YOURSELF - OR THAT YOU ARE A FAILURE OR HAVE LET YOURSELF OR YOUR FAMILY DOWN: 0
1. LITTLE INTEREST OR PLEASURE IN DOING THINGS: 0
10. IF YOU CHECKED OFF ANY PROBLEMS, HOW DIFFICULT HAVE THESE PROBLEMS MADE IT FOR YOU TO DO YOUR WORK, TAKE CARE OF THINGS AT HOME, OR GET ALONG WITH OTHER PEOPLE: 2
3. TROUBLE FALLING OR STAYING ASLEEP: 3
SUM OF ALL RESPONSES TO PHQ QUESTIONS 1-9: 12
7. TROUBLE CONCENTRATING ON THINGS, SUCH AS READING THE NEWSPAPER OR WATCHING TELEVISION: 0

## 2020-11-10 NOTE — PROGRESS NOTES
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 Tumor stained for TTF-1 and negative for PDL 1. She was subsequently treated with concurrent chemoradiation therapy which she completed on October 12, 2017. She received 2 additional cycles of chemotherapy with carboplatin and paclitaxel which she completed on November 28, 2017. She has known small lesion in thu which they believe was telangiectasia initially. She had repeat brain MRI on March 26, 2018 and it showed redemonstration of an enhancing intra-axial lesion centrally within the thu. Findings of progressive enlargement are concerning for metastatic lesion. No significant mass-effects and no additional abnormal enhancement. She was subsequently evaluated by radiation oncologist, Dr. Wojciech Wu and she received 5 fractions (total 2500cGy) of stereotactic radiotherapy. She completed it on May 31, 2018. She has decided to follow her lung cancer locally here in [facility]. Her case was subsequently referred to us for further management. She is scheduled to have CT scans in July 6, 2018 at [facility]. She was followed closely with CT scans every 3 month and MRI of the brain every  2 month. She has CT scan of the chest with contrast on July 6, 2018 and it showed status post right upper lobectomy, typical postop changes. A soft tissue focus in the superior segment of the right lower lobe is stable. This could be fibrotic. She has atherosclerosis including coronary calcification and scoliosis. CT scan of the chest done on October 8, 2018 showed status post right upper lobe resection. Increasing soft tissue is identified along the surgical margin and right hilar structures. Findings are favored to represent evolving post treatment changes, however recurrence would be difficult to entirely exclude. Recommend continued attention on follow-up versus PET/CT.  Ill-defined hypodense mass identified in segment 6, highly suspicious for malignancy. Finding is new since March 2018. Consider dedicated MRI with Eovist contrast or PET/CT. Subtle ill-defined hypodense mass in segment 7. This finding is less specific and may represent a hemangioma versus metastatic disease. Consider dedicated MRI with Eovist contrast or PET/CT. Probable new subtle superior endplate deformity of T6. No obvious underlying osseous metastasis. Moderate emphysema. PET/CT scan done on 99/36/49 showed metabolically active hepatic metastases. Mild metabolic activity associated with medial right lung airspace consolidation and volume loss, likely post treatment changes. Mild metabolic activity associated with T6 compression fracture, no evidence of underlying metastasis. Since she is found to have metastatic non-small cell lung cancer, first-line chemotherapy with carboplatin and etoposide was started on November 12, 2018. CT scan of the soft tissue neck, chest, abdomen and pelvis done on 2/11/19 showed no metastatic disease in neck. Patient's known liver metastases are not appreciated on today's CT scan, compatible with response to treatment. Increased sclerosis involving the compression deformity of T6. Finding is highly suspicious for pathologic fracture related to metastatic disease. No additional osseous metastases are seen. Post treatment changes identified in the right perihilar region. No CT evidence of local recurrence. Severe atherosclerosis. Cholecystectomy and hysterectomy. MRI of the brain done on 3/21/19 at [facility] was normal according to her. They are planning to have repeat MRI brain and MRI thoracic spine in three months. CT scan of the soft tissue neck, chest, abdomen and pelvis done on May 16, 2019 showed no new or worsening metastatic disease to the chest, abdomen and pelvis. No evidence of metastatic disease to the liver.  Stable compression deformity with sclerosis involving T6 vertebral body which could reflect pathologic fracture from body metastatic disease. No evidence of metastatic disease in her neck. CT scan of the soft tissue neck, chest, abdomen and pelvis done on 3/9/20 showed stable postsurgical changes and posttreatment changes within the right lung. Patchy areas of dependent and peripheral ground-glass attenuation, nonspecific and may be related to infection or inflammation and can be followed on subsequent imaging. Continued slight interval decrease in size of hepatic metastases. Otherwise stable exam with no new findings of metastatic disease in the chest, abdomen or pelvis. Moderate-to-severe atherosclerosis. 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 Slides form  that are available and reviewed. Inly a spindle cell component is present on the slides reviewed. The spindle cell components are similar, and TTF-1 being positive support the diagnosis]. Foundation one testing was requested and it showed no reportable alterations. CT scans done on 9/18/20 showed disease progression with multiple new and enlarged liver metastases compared to exam on March 9, 2020. On November 10, 2020, she presented to me for follow-up since she continues to feel bad due to nausea, vomiting, loss of appetite and abdominal pain.      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 PET/CT scan done on October 20, 2018 showed metastatic disease and we started first-line chemotherapy with carboplatin and etoposide since November 12, 2018. After first cycle, we switched her chemotherapy to cisplatin and etoposide since she prefers to have cisplatin instead. However, she develops significant tiredness, fatigue and she has been wheel chair bound for about a week after one cycle of chemotherapy with cisplatin and etoposide. Her chemo was switched back to carboplatin and etoposide. She completed total six cycles of chemotherapy on 3/1/19. We started maintenance chemotherapy with atezolizumab since 3/20/19. We stopped it on 8/14/19 since she has progression of the disease. Since CT scan done on 8/27/19 showed progression of the disease, we changed her chemotherapy to second line chemotherapy with docetaxel and ramucirumab. It was started on September 23, 2019. CT scan of the soft tissue neck, chest, abdomen and pelvis done on March 9, 2020 showed at least some stable disease to improvement. After 10th cycle of chemo with doxetaxel and ramucirumab, she becomes more debility due to side effects from chemotherapy. She also developed c diff colitis. We decided to stop docetaxel and ramucirumab completely. She has been on chemoholiday since she developed c diff colitis. We discussed different options, including to continue with current chemo or to change it to different regimen since she has difficulty tolerating it well. She has question about her liver metastasis and she wants to know exactly what type of cancer it is. Final pathology from liver biopsy was consistent with metastatic non-small cell lung cancer. Foundation one testing was requested and it showed no reportable alterations. Since she is feeling better now, we decided to resume chemotherapy.  I recommend to start single agent gemcitabine and she started her first chemo on 7/14/20. She feels weak and tired after chemotherapy. She has decided to hold chemo after first cycle and she has been off chemo until now. She continues to feel weak, tired, nausea, vomiting, loss of appetite and pain. Her symptoms are getting worse lately. I discussed with her about philosophy of hospice and I believe she will benefit great deal from hospice. After long discussion with her, she is in agreement to start hospice. We will request hospice to start it as soon as possible. She does not have any other significant symptoms at today's visit. Past Medical History  Significant for  1. Gastroesophageal reflux disease  2. Hypothyroidism    Surgical History    Significant for  1. Cholecystectomy  2. Hysterectomy  3. Pilonidal cyst excision    Allergies  Augmentin  Linzess  Zoloft  codeine    Social History  She was a former smoker and she quit smoking in March 7, 2002. She used to smoke 1 pack per day for approximately 30 years. She denies alcohol abuse and illicit drug abuse. She is currently living in [facility]. Family History  Significant for prostate cancer in one of her brother, esophageal cancer in another brother. Her maternal aunt has uterine cancer and maternal grandmother has esophageal cancer. No other pertinent family history. Review of Systems: \"Per interval history; otherwise 10 point ROS is negative. \"  Her energy level is very low, appetite, and sleep are not good. She doesn't have fever, chills, night sweats, cough, shortness of breath, chest pain, hemoptysis or palpitations. Her bowel and bladder functions are normal, except she has nausea, vomiting and abdominal pain. Denies diarrhea, constipation or dysuria. She doesn't have neuropathy and she denies bleeding or clotting issues. She still has pain at today visit. Denies anxiety or depression. The rest of the systems are unremarkable.       Vital Signs:  /68 (Site: Left Upper Arm, Position: Sitting, Cuff Size: Medium Adult)   Pulse 94   Temp 97.8 °F (36.6 °C) (Infrared)   Resp 16   Ht 5' 3.5\" (1.613 m)   SpO2 93%   BMI 26.85 kg/m²     Physical Exam:  CONSTITUTIONAL: awake, no apparent distress, alert, cooperative,    EYES: pupils equal, round and reactive to light, sclera clear and conjunctiva normal  ENT: Normocephalic, without obvious abnormality, atraumatic  NECK: supple, symmetrical, no jugular venous distension and no carotid bruits   HEMATOLOGIC/LYMPHATIC: no cervical, supraclavicular or axillary lymphadenopathy   LUNGS: no wheezes, VBS, no increased work of breathing and clear to auscultation, Has crackles. No rhonchi    CARDIOVASCULAR: regular rate and rhythm, normal S1 and S2, no murmur noted  ABDOMEN:  soft, non-distended, no hepatosplenomegaly, normal bowel sounds x 4, non-tender, no masses palpated,    MUSCULOSKELETAL: full range of motion noted, tone is normal  NEUROLOGIC: awake, alert, oriented to name, place and time. Motor skills grossly intact.   SKIN: Normal skin color, texture, turgor and no jaundice.  appears intact   EXTREMITIES: no LE edema, no leg swelling, no cyanosis, no clubbing,      Labs:  Hematology:  Lab Results   Component Value Date    WBC 3.6 (L) 07/21/2020    RBC 3.98 (L) 07/21/2020    HGB 11.4 (L) 07/21/2020    HCT 34.3 (L) 07/21/2020    MCV 86.2 07/21/2020    MCH 28.6 07/21/2020    MCHC 33.2 07/21/2020    RDW 17.4 (H) 07/21/2020     07/21/2020    MPV 9.0 07/21/2020    BANDSPCT 12 (H) 01/03/2020    SEGSPCT 54.4 07/21/2020    EOSRELPCT 1.4 07/21/2020    BASOPCT 0.6 07/21/2020    LYMPHOPCT 26.2 07/21/2020    MONOPCT 17.4 (H) 07/21/2020    BANDABS 1.04 01/03/2020    SEGSABS 2.0 07/21/2020    EOSABS 0.1 07/21/2020    BASOSABS 0.0 07/21/2020    LYMPHSABS 1.0 07/21/2020    MONOSABS 0.6 07/21/2020    DIFFTYPE AUTOMATED DIFFERENTIAL 07/21/2020    ANISOCYTOSIS 1+ 01/03/2020    POLYCHROM 1+ 03/10/2019    WBCMORP OCCASIONAL  ATYPICAL LYMPHS   03/11/2019 PLTM FEW 02/25/2019     No results found for: ESR  Chemistry:  Lab Results   Component Value Date     07/21/2020    K 3.9 07/21/2020    CL 95 (L) 07/21/2020    CO2 26 07/21/2020    BUN 5 (L) 07/21/2020    CREATININE 0.6 09/18/2020    GLUCOSE 102 (H) 07/21/2020    CALCIUM 9.0 07/21/2020    PROT 6.0 (L) 07/21/2020    LABALBU 3.6 07/21/2020    BILITOT 0.2 07/21/2020    ALKPHOS 226 (H) 07/21/2020    AST 46 (H) 07/21/2020    ALT 43 (H) 07/21/2020    LABGLOM >60 09/18/2020    GFRAA >60 09/18/2020    PHOS 2.3 (L) 03/12/2019    MG 1.8 05/02/2020     No results found for: MMA, LDH, HOMOCYSTEINE  No components found for: LD  Lab Results   Component Value Date    TSHHS 4.910 (H) 03/13/2020    T4FREE 1.61 03/13/2020     Immunology:  Lab Results   Component Value Date    PROT 6.0 (L) 07/21/2020     No results found for: Rafy Tiffanieler, KLFLCR  No results found for: B2M  Coagulation Panel:  Lab Results   Component Value Date    PROTIME 12.1 06/03/2020    INR 1.00 06/03/2020    APTT 36.7 06/03/2020     Anemia Panel:  Lab Results   Component Value Date    UVDOWBDB55 1852 (H) 03/13/2020    FOLATE >20.0 (H) 03/13/2020     Tumor Markers:  No results found for: , CEA, , LABCA2, PSA  Observations:  PHQ-9 Total Score: 12 (11/10/2020 11:52 AM)  Thoughts that you would be better off dead, or of hurting yourself in some way: 0 (11/10/2020 11:52 AM)     Assessment & Plan:   1. Stage IIIA right lung non small cell lung cancer - s/p concurrent chemoradiation therapy  2. History of superior sulcus non small cell lung cancer - s/p neoadjuvant chemoradiation followed by surgery in 2002. 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 Breanna Irby is not feeling well. On November 10, 2020, she presented to me for follow-up since she continues to feel bad due to nausea, vomiting, loss of appetite and abdominal pain.      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 PET/CT scan done on October 20, 2018 showed metastatic disease and we started first-line chemotherapy with carboplatin and etoposide since November 12, 2018. After first cycle, we switched her chemotherapy to cisplatin and etoposide since she prefers to have cisplatin instead. However, she develops significant tiredness, fatigue and she has been wheel chair bound for about a week after one cycle of chemotherapy with cisplatin and etoposide. Her chemo was switched back to carboplatin and etoposide. She completed total six cycles of chemotherapy on 3/1/19. We started maintenance chemotherapy with atezolizumab since 3/20/19. We stopped it on 8/14/19 since she has progression of the disease. Since CT scan done on 8/27/19 showed progression of the disease, we changed her chemotherapy to second line chemotherapy with docetaxel and ramucirumab. It was started on September 23, 2019. CT scan of the soft tissue neck, chest, abdomen and pelvis done on March 9, 2020 showed at least some stable disease to improvement. After 10th cycle of chemo with doxetaxel and ramucirumab, she becomes more debility due to side effects from chemotherapy. She also developed c diff colitis. We decided to stop docetaxel and ramucirumab completely. She has been on chemoholiday since she developed c diff colitis. We discussed different options, including to continue with current chemo or to change it to different regimen since she has difficulty tolerating it well.  She has question about her liver metastasis and she

## 2020-11-10 NOTE — PROGRESS NOTES
MA Rooming Questions  Patient: Malini Irby  MRN: [id number]    Date: 11/10/2020        1. Do you have any new issues? yes - Shoulder and back pain, urinary complications         2. Do you need any refills on medications?    no    3. Have you had any imaging done since your last visit?   no    4. Have you been hospitalized or seen in the emergency room since your last visit here?   no    5. Did the patient have a depression screening completed today?  Yes    PHQ-9 Total Score: 12 (11/10/2020 11:52 AM)  Thoughts that you would be better off dead, or of hurting yourself in some way: 0 (11/10/2020 11:52 AM)       PHQ-9 Given to (if applicable): Dr. Julius Hester              PHQ-9 Score (if applicable):                     [x] Positive     []  Negative              Does question #9 need addressed (if applicable)                     [] Yes    [x]  No               Layla Turner MA

## 2020-11-29 NOTE — PROGRESS NOTES
Patient Name: Haris Harp  Patient : 1955  Patient MRN: [id number]     Primary Oncologist: Cathy Conklin MD  Referring Provider: Trish Armenta MD     Date of Service: 2020      Chief Complaint:   Chief Complaint   Patient presents with    Follow-up     Patient Active Problem List:     Malignant neoplasm of upper lobe of right lung      Secondary malignant neoplasm of brain stem    HPI:   Mr. Carlin Uribe is a 28-year-old very pleasant patient with medical history significant for gastroesophageal reflux disease, hypothyroidismand history of right superior sulcus non small cell lung cancer, , status post neoadjuvant chemoradiation therapy followed by right thoracotomy, right upper lobe lobectomy with en bloc resection of first and second ribs on 2002 by Dr. Erlinda Dave, stage IIIA non-small cell lung cancer, status post concurrent chemoradiation therapy, and adjuvant chemotherapy completed therapy in , brain stem (thu) metastasis status post stereotactic body radiation therapy, initially referred to me on 2018 for evaluation of her lung cancer. She stated that she was first diagnosed with right upper lobe superior sulcus non-small cell lung cancer in  and she was treated with neoadjuvant concurrent chemoradiation therapy followed by right thoracotomy, right upper lobe lobectomy with en bloc resection of first and second ribs on 2002 by Dr. Erlinda Dave. She has been followed closely since then. She has PET CT scan in 2017 which showed only pleural nodule and the positive lymph node. At that time, decision was made to follow her Lesion closely. Repeat scan in 2017 showed progression of nodes and the pleural-based lesion. She subsequently had EBUS and biopsy on 2017. She was found to have non-small cell carcinoma. However, tissue was insufficient for markers and PDL 1.     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 Tumor stained for TTF-1 and negative for PDL 1. She was subsequently treated with concurrent chemoradiation therapy which she completed on October 12, 2017. She received 2 additional cycles of chemotherapy with carboplatin and paclitaxel which she completed on November 28, 2017. She has known small lesion in thu which they believe was telangiectasia initially. She had repeat brain MRI on March 26, 2018 and it showed redemonstration of an enhancing intra-axial lesion centrally within the thu. Findings of progressive enlargement are concerning for metastatic lesion. No significant mass-effects and no additional abnormal enhancement. She was subsequently evaluated by radiation oncologist, Dr. Claudia Luu and she received 5 fractions (total 2500cGy) of stereotactic radiotherapy. She completed it on May 31, 2018. She has decided to follow her lung cancer locally here in [facility]. Her case was subsequently referred to us for further management. She is scheduled to have CT scans in July 6, 2018 at [facility]. She was followed closely with CT scans every 3 month and MRI of the brain every  2 month. She has CT scan of the chest with contrast on July 6, 2018 and it showed status post right upper lobectomy, typical postop changes. A soft tissue focus in the superior segment of the right lower lobe is stable. This could be fibrotic. She has atherosclerosis including coronary calcification and scoliosis. CT scan of the chest done on October 8, 2018 showed status post right upper lobe resection. Increasing soft tissue is identified along the surgical margin and right hilar structures. Findings are favored to represent evolving post treatment changes, however recurrence would be difficult to entirely exclude. Recommend continued attention on follow-up versus PET/CT.  Ill-defined hypodense mass identified in segment 6, highly suspicious for malignancy. Finding is new since March 2018. Consider dedicated MRI with Eovist contrast or PET/CT. Subtle ill-defined hypodense mass in segment 7. This finding is less specific and may represent a hemangioma versus metastatic disease. Consider dedicated MRI with Eovist contrast or PET/CT. Probable new subtle superior endplate deformity of T6. No obvious underlying osseous metastasis. Moderate emphysema. PET/CT scan done on 03/09/85 showed metabolically active hepatic metastases. Mild metabolic activity associated with medial right lung airspace consolidation and volume loss, likely post treatment changes. Mild metabolic activity associated with T6 compression fracture, no evidence of underlying metastasis. Since she is found to have metastatic non-small cell lung cancer, first-line chemotherapy with carboplatin and etoposide was started on November 12, 2018. CT scan of the soft tissue neck, chest, abdomen and pelvis done on 2/11/19 showed no metastatic disease in neck. Patient's known liver metastases are not appreciated on today's CT scan, compatible with response to treatment. Increased sclerosis involving the compression deformity of T6. Finding is highly suspicious for pathologic fracture related to metastatic disease. No additional osseous metastases are seen. Post treatment changes identified in the right perihilar region. No CT evidence of local recurrence. Severe atherosclerosis. Cholecystectomy and hysterectomy. MRI of the brain done on 3/21/19 at [facility] was normal according to her. They are planning to have repeat MRI brain and MRI thoracic spine in three months. CT scan of the soft tissue neck, chest, abdomen and pelvis done on May 16, 2019 showed no new or worsening metastatic disease to the chest, abdomen and pelvis. No evidence of metastatic disease to the liver.  Stable compression deformity with sclerosis involving T6 vertebral body which could reflect pathologic fracture from body metastatic disease. No evidence of metastatic disease in her neck. CT scan of the soft tissue neck, chest, abdomen and pelvis done on 3/9/20 showed stable postsurgical changes and posttreatment changes within the right lung. Patchy areas of dependent and peripheral ground-glass attenuation, nonspecific and may be related to infection or inflammation and can be followed on subsequent imaging. Continued slight interval decrease in size of hepatic metastases. Otherwise stable exam with no new findings of metastatic disease in the chest, abdomen or pelvis. Moderate-to-severe atherosclerosis. 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 Slides form  that are available and reviewed. Inly a spindle cell component is present on the slides reviewed. The spindle cell components are similar, and TTF-1 being positive support the diagnosis]. Foundation one testing was requested and it showed no reportable alterations. CT scans done on 9/18/20 showed disease progression with multiple new and enlarged liver metastases compared to exam on March 9, 2020. On December 1, 2020, she presented to me for follow-up via televisit. 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      PET/CT scan done on October 20, 2018 showed metastatic disease and we started first-line chemotherapy with carboplatin and etoposide since November 12, 2018. After first cycle, we switched her chemotherapy to cisplatin and etoposide since she prefers to have cisplatin instead. However, she develops significant tiredness, fatigue and she has been wheel chair bound for about a week after one cycle of chemotherapy with cisplatin and etoposide. Her chemo was switched back to carboplatin and etoposide. She completed total six cycles of chemotherapy on 3/1/19. We started maintenance chemotherapy with atezolizumab since 3/20/19. We stopped it on 8/14/19 since she has progression of the disease. Since CT scan done on 8/27/19 showed progression of the disease, we changed her chemotherapy to second line chemotherapy with docetaxel and ramucirumab. It was started on September 23, 2019. CT scan of the soft tissue neck, chest, abdomen and pelvis done on March 9, 2020 showed at least some stable disease to improvement. After 10th cycle of chemo with doxetaxel and ramucirumab, she becomes more debility due to side effects from chemotherapy. She also developed c diff colitis. We decided to stop docetaxel and ramucirumab completely. She has been on chemoholiday since she developed c diff colitis. We discussed different options, including to continue with current chemo or to change it to different regimen since she has difficulty tolerating it well. She has question about her liver metastasis and she wants to know exactly what type of cancer it is. Final pathology from liver biopsy was consistent with metastatic non-small cell lung cancer. Foundation one testing was requested and it showed no reportable alterations. Since she is feeling better now, we decided to resume chemotherapy. I recommend to start single agent gemcitabine and she started her first chemo on 7/14/20. She feels weak and tired after chemotherapy.  She has decided to hold chemo after first cycle and she has been off chemo until now. She started hospice since 11/10/20 and she has been quite comfortable with hospice. Her symptoms are quite taking care off by hospice. I recommend her and her  to stay in hospice. Since she has been stable, I will start following her as needed basis. She does not have any new significant symptoms at today's visit. Past Medical History  Significant for  1. Gastroesophageal reflux disease  2. Hypothyroidism    Surgical History    Significant for  1. Cholecystectomy  2. Hysterectomy  3. Pilonidal cyst excision    Allergies  Augmentin  Linzess  Zoloft  codeine    Social History  She was a former smoker and she quit smoking in March 7, 2002. She used to smoke 1 pack per day for approximately 30 years. She denies alcohol abuse and illicit drug abuse. She is currently living in [facility]. Family History  Significant for prostate cancer in one of her brother, esophageal cancer in another brother. Her maternal aunt has uterine cancer and maternal grandmother has esophageal cancer. No other pertinent family history. Review of Systems: \"Per interval history; otherwise 10 point ROS is negative. \"  Her energy level is still very low, appetite, and sleep are stable. She denies fever, chills, night sweats, cough, shortness of breath, chest pain, hemoptysis or palpitations. Her bowel and bladder functions are normal, except she continues to have nausea, vomiting and abdominal pain. No diarrhea, constipation or dysuria. She has stable neuropathy and she denies bleeding or clotting issues. She still has pain but well tolerated. No anxiety or depression. The rest of the systems are unremarkable. Vital Signs: There were no vitals taken for this visit.     Physical Exam:    Labs:  Hematology:  Lab Results   Component Value Date    WBC 3.6 (L) 07/21/2020    RBC 3.98 (L) 07/21/2020    HGB 11.4 (L) 07/21/2020    HCT 34.3 (L) 07/21/2020    [facility]V lung cancer - s/p concurrent chemoradiation therapy  2. History of superior sulcus non small cell lung cancer - s/p neoadjuvant chemoradiation followed by surgery in 2002. 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 Vannesa River is feeling quite stable like in last office visit. On December 1, 2020, she presented to me for follow-up. 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 PET/CT scan done on October 20, 2018 showed metastatic disease and we started first-line chemotherapy with carboplatin and etoposide since November 12, 2018. After first cycle, we switched her chemotherapy to cisplatin and etoposide since she prefers to have cisplatin instead. However, she develops significant tiredness, fatigue and she has been wheel chair bound for about a week after one cycle of chemotherapy with cisplatin and etoposide. Her chemo was switched back to carboplatin and etoposide. She completed total six cycles of chemotherapy on 3/1/19. We started maintenance chemotherapy with atezolizumab since 3/20/19. We stopped it on 8/14/19 since she has progression of the disease. Since CT scan done on 8/27/19 showed progression of the disease, we changed her chemotherapy to second line chemotherapy with docetaxel and ramucirumab. It was started on September 23, 2019. CT scan of the soft tissue neck, chest, abdomen and pelvis done on March 9, 2020 showed at least some stable disease to improvement.     After 10th cycle of chemo with doxetaxel and ramucirumab, she becomes more debility due to side effects from chemotherapy. She also developed c diff colitis. We decided to stop docetaxel and ramucirumab completely. She has been on chemoholiday since she developed c diff colitis. We discussed different options, including to continue with current chemo or to change it to different regimen since she has difficulty tolerating it well. She has question about her liver metastasis and she wants to know exactly what type of cancer it is. Final pathology from liver biopsy was consistent with metastatic non-small cell lung cancer. Foundation one testing was requested and it showed no reportable alterations. Since she is feeling better now, we decided to resume chemotherapy. I recommend to start single agent gemcitabine and she started her first chemo on 7/14/20. She feels weak and tired after chemotherapy. She has decided to hold chemo after first cycle and she has been off chemo until now. She started hospice since 11/10/20 and she has been quite comfortable with hospice. Her symptoms are quite taking care off by hospice. I recommend her and her  to stay in hospice. Since she has been stable, I will start following her as needed basis. I answered all their questions and concerns for today. I asked her to follow-up with primary care physician on regular basis. Recent imaging and labs were reviewed and discussed with the patient.    Total time spent with patient - 11 minutes

## 2020-12-01 ENCOUNTER — OFFICE VISIT (OUTPATIENT)
Dept: ONCOLOGY | Age: 65
End: 2020-12-01
Payer: COMMERCIAL

## 2020-12-01 PROCEDURE — 99422 OL DIG E/M SVC 11-20 MIN: CPT | Performed by: INTERNAL MEDICINE

## 2020-12-01 RX ORDER — OXYBUTYNIN CHLORIDE 5 MG/1
5 TABLET ORAL 2 TIMES DAILY
COMMUNITY

## 2020-12-01 ASSESSMENT — PATIENT HEALTH QUESTIONNAIRE - PHQ9
SUM OF ALL RESPONSES TO PHQ9 QUESTIONS 1 & 2: 0
1. LITTLE INTEREST OR PLEASURE IN DOING THINGS: 0
SUM OF ALL RESPONSES TO PHQ QUESTIONS 1-9: 0
2. FEELING DOWN, DEPRESSED OR HOPELESS: 0
SUM OF ALL RESPONSES TO PHQ QUESTIONS 1-9: 0
SUM OF ALL RESPONSES TO PHQ QUESTIONS 1-9: 0

## 2020-12-01 NOTE — PROGRESS NOTES
MA Rooming Questions  Patient: Faustina Sweet  MRN: [id number]    Date: 12/1/2020        1. Do you have any new issues? yes - patient is now hospice care          2. Do you need any refills on medications?    no    3. Have you had any imaging done since your last visit?   no    4. Have you been hospitalized or seen in the emergency room since your last visit here?   no    5. Did the patient have a depression screening completed today?  Yes    PHQ-9 Total Score: 0 (12/1/2020 11:15 AM)       PHQ-9 Given to (if applicable):               PHQ-9 Score (if applicable):                     [] Positive     []  Negative              Does question #9 need addressed (if applicable)                     [] Yes    []  No               Aneesh Nur MA